# Patient Record
Sex: MALE | Race: WHITE | Employment: FULL TIME | URBAN - NONMETROPOLITAN AREA
[De-identification: names, ages, dates, MRNs, and addresses within clinical notes are randomized per-mention and may not be internally consistent; named-entity substitution may affect disease eponyms.]

---

## 2017-05-31 DIAGNOSIS — I10 ESSENTIAL HYPERTENSION: ICD-10-CM

## 2017-05-31 DIAGNOSIS — E03.2 HYPOTHYROIDISM DUE TO MEDICATION: ICD-10-CM

## 2017-05-31 NOTE — LETTER
[facility]  [address] [facility] [address]  Phone: [phone]        June 1, 2017      Brandt EMERSON Wiley                                                                                                                   [address] [facility] [address]            Dear Mr. Wiley,    We are concerned about your health care.  We recently provided you with a medication refill.  Many medications require routine follow-up with your Doctor.      At this time we ask that: You schedule an appointment for your annual physical.    Your prescription: Has been refilled for 1 month so you may have time for the above noted follow-up.      Thank you,      Sebastien Faria MD/ ss

## 2017-06-01 RX ORDER — ATENOLOL 25 MG/1
25 TABLET ORAL DAILY
Qty: 30 TABLET | Refills: 0 | Status: SHIPPED | OUTPATIENT
Start: 2017-06-01 | End: 2017-07-05

## 2017-06-01 RX ORDER — LEVOTHYROXINE SODIUM 50 UG/1
50 TABLET ORAL DAILY
Qty: 30 TABLET | Refills: 0 | Status: SHIPPED | OUTPATIENT
Start: 2017-06-01 | End: 2017-07-05

## 2017-06-01 NOTE — TELEPHONE ENCOUNTER
Atenolol 25 mg      Last Written Prescription Date: 6/27/16  Last Fill Quantity: 90, # refills: 3  Last Office Visit with [facility], [facility] or [facility] prescribing provider: 6/27/16       Potassium   Date Value Ref Range Status   01/18/2016 4.2 3.4 - 5.3 mmol/L Final     Creatinine   Date Value Ref Range Status   01/18/2016 0.81 0.66 - 1.25 mg/dL Final     BP Readings from Last 3 Encounters:   06/27/16 118/76   02/22/16 146/89   06/01/15 132/86     Levothyroxine 50 mcg     Last Written Prescription Date: 6/27/16  Last Quantity: 90, # refills: 3  Last Office Visit with [facility], [facility] or [facility] prescribing provider: 6/27/16        TSH   Date Value Ref Range Status   01/18/2016 1.84 0.40 - 4.00 mU/L Final

## 2017-06-15 DIAGNOSIS — F20.9 SCHIZOPHRENIA, UNSPECIFIED TYPE (H): Primary | ICD-10-CM

## 2017-06-15 RX ORDER — MULTIVITAMIN WITH IRON
100 TABLET ORAL DAILY
Qty: 90 TABLET | Refills: 3 | Status: SHIPPED | OUTPATIENT
Start: 2017-06-15 | End: 2018-06-19

## 2017-06-15 NOTE — TELEPHONE ENCOUNTER
pyridoxine (VITAMIN B-6) 100 MG tablet      Last Written Prescription Date:    Last Fill Quantity: ,   # refills:   Last Office Visit with [facility], [facility] or [facility] prescribing provider: 6/27/2016  Future Office visit:       Routing refill request to provider for review/approval because:  Medication is reported/historical

## 2017-07-05 DIAGNOSIS — E03.2 HYPOTHYROIDISM DUE TO MEDICATION: ICD-10-CM

## 2017-07-05 DIAGNOSIS — I10 ESSENTIAL HYPERTENSION: ICD-10-CM

## 2017-07-05 RX ORDER — ATENOLOL 25 MG/1
TABLET ORAL
Qty: 30 TABLET | Refills: 0 | Status: SHIPPED | OUTPATIENT
Start: 2017-07-05 | End: 2017-07-26

## 2017-07-05 RX ORDER — LEVOTHYROXINE SODIUM 50 UG/1
TABLET ORAL
Qty: 30 TABLET | Refills: 0 | Status: SHIPPED | OUTPATIENT
Start: 2017-07-05 | End: 2017-07-26

## 2017-07-05 NOTE — TELEPHONE ENCOUNTER
levothyroxine (SYNTHROID/LEVOTHROID) 50 MCG tablet     Last Written Prescription Date: 06/01/2017  Last Quantity: 30 tablet, # refills: 0  Last Office Visit with [facility], [facility] or [facility] prescribing provider: 06/27/2016        TSH   Date Value Ref Range Status   01/18/2016 1.84 0.40 - 4.00 mU/L Final     atenolol (TENORMIN) 25 MG tablet      Last Written Prescription Date: 06/01/2017  Last Fill Quantity: 30 tablet, # refills: 0    Last Office Visit with [facility], [facility] or [facility] prescribing provider:  06/27/2016   Future Office Visit:        BP Readings from Last 3 Encounters:   06/27/16 118/76   02/22/16 146/89   06/01/15 132/86

## 2017-07-26 DIAGNOSIS — E03.2 HYPOTHYROIDISM DUE TO MEDICATION: ICD-10-CM

## 2017-07-26 DIAGNOSIS — I10 ESSENTIAL HYPERTENSION: ICD-10-CM

## 2017-07-26 RX ORDER — ATENOLOL 25 MG/1
TABLET ORAL
Qty: 30 TABLET | Refills: 0 | Status: SHIPPED | OUTPATIENT
Start: 2017-07-26 | End: 2018-02-08

## 2017-07-26 RX ORDER — LEVOTHYROXINE SODIUM 50 UG/1
TABLET ORAL
Qty: 30 TABLET | Refills: 0 | Status: SHIPPED | OUTPATIENT
Start: 2017-07-26 | End: 2017-08-23

## 2017-07-26 NOTE — LETTER
[facility]  [address] [facility] [address]  Phone: [phone]        July 27, 2017      Brandt Wiley                                                                                                                   [address] [facility] [address]            Dear Mr. Wiley,    We are concerned about your health care.  We recently provided you with a medication refill.  Many medications require routine follow-up with your Doctor.      At this time we ask that: You schedule an appointment for your annual physical.    Your prescription: Has been refilled for 1 month so you may have time for the above noted follow-up.      Thank you,      Sebastien Faria MD/ ss

## 2017-07-26 NOTE — TELEPHONE ENCOUNTER
Last seen 6/27/16.  Medication is being filled for 1 time refill only due to:  Patient needs to be seen because it has been more than one year since last visit.   TSH   Date Value Ref Range Status   01/18/2016 1.84 0.40 - 4.00 mU/L Final   ]    Left message for patient to return a call to the clinic RN.  Due for provider alonzo Morgan RN

## 2017-07-26 NOTE — TELEPHONE ENCOUNTER
atenolol (TENORMIN) 25 MG tablet      Last Written Prescription Date: 7/5/17  Last Fill Quantity: 30, # refills: 0  Last Office Visit with [facility], [facility] or  [facility] prescribing provider: 6/27/17       Potassium   Date Value Ref Range Status   01/18/2016 4.2 3.4 - 5.3 mmol/L Final     Creatinine   Date Value Ref Range Status   01/18/2016 0.81 0.66 - 1.25 mg/dL Final     BP Readings from Last 3 Encounters:   06/27/16 118/76   02/22/16 146/89   06/01/15 132/86         levothyroxine (SYNTHROID/LEVOTHROID) 50 MCG tablet     Last Written Prescription Date: 7/5/17  Last Quantity: 30, # refills: 0  Last Office Visit with [facility], [facility], [facility] or [facility] prescribing provider: 6/27/17        TSH   Date Value Ref Range Status   01/18/2016 1.84 0.40 - 4.00 mU/L Final

## 2017-08-23 ENCOUNTER — OFFICE VISIT (OUTPATIENT)
Dept: FAMILY MEDICINE | Facility: CLINIC | Age: 37
End: 2017-08-23
Payer: MEDICAID

## 2017-08-23 VITALS
HEART RATE: 72 BPM | HEIGHT: 70 IN | WEIGHT: 280 LBS | RESPIRATION RATE: 16 BRPM | SYSTOLIC BLOOD PRESSURE: 120 MMHG | DIASTOLIC BLOOD PRESSURE: 78 MMHG | BODY MASS INDEX: 40.09 KG/M2 | TEMPERATURE: 98 F

## 2017-08-23 DIAGNOSIS — Z79.899 LITHIUM USE: ICD-10-CM

## 2017-08-23 DIAGNOSIS — E03.2 HYPOTHYROIDISM DUE TO MEDICATION: ICD-10-CM

## 2017-08-23 DIAGNOSIS — R73.09 ELEVATED GLUCOSE: ICD-10-CM

## 2017-08-23 DIAGNOSIS — I10 ESSENTIAL HYPERTENSION: ICD-10-CM

## 2017-08-23 DIAGNOSIS — E66.01 MORBID OBESITY DUE TO EXCESS CALORIES (H): ICD-10-CM

## 2017-08-23 DIAGNOSIS — F20.9 SCHIZOPHRENIA, UNSPECIFIED TYPE (H): Primary | ICD-10-CM

## 2017-08-23 DIAGNOSIS — E78.5 HYPERLIPIDEMIA LDL GOAL <160: ICD-10-CM

## 2017-08-23 DIAGNOSIS — F79 MENTAL RETARDATION: ICD-10-CM

## 2017-08-23 LAB
ERYTHROCYTE [DISTWIDTH] IN BLOOD BY AUTOMATED COUNT: 13.8 % (ref 10–15)
HBA1C MFR BLD: 4.7 % (ref 4.3–6)
HCT VFR BLD AUTO: 38.9 % (ref 40–53)
HGB BLD-MCNC: 12.5 G/DL (ref 13.3–17.7)
MCH RBC QN AUTO: 29.4 PG (ref 26.5–33)
MCHC RBC AUTO-ENTMCNC: 32.1 G/DL (ref 31.5–36.5)
MCV RBC AUTO: 92 FL (ref 78–100)
PLATELET # BLD AUTO: 198 10E9/L (ref 150–450)
RBC # BLD AUTO: 4.25 10E12/L (ref 4.4–5.9)
WBC # BLD AUTO: 6.3 10E9/L (ref 4–11)

## 2017-08-23 PROCEDURE — 84443 ASSAY THYROID STIM HORMONE: CPT | Performed by: FAMILY MEDICINE

## 2017-08-23 PROCEDURE — 83036 HEMOGLOBIN GLYCOSYLATED A1C: CPT | Performed by: FAMILY MEDICINE

## 2017-08-23 PROCEDURE — 85027 COMPLETE CBC AUTOMATED: CPT | Performed by: FAMILY MEDICINE

## 2017-08-23 PROCEDURE — 36415 COLL VENOUS BLD VENIPUNCTURE: CPT | Performed by: FAMILY MEDICINE

## 2017-08-23 PROCEDURE — 80053 COMPREHEN METABOLIC PANEL: CPT | Performed by: FAMILY MEDICINE

## 2017-08-23 PROCEDURE — 99214 OFFICE O/P EST MOD 30 MIN: CPT | Performed by: FAMILY MEDICINE

## 2017-08-23 PROCEDURE — 80061 LIPID PANEL: CPT | Performed by: FAMILY MEDICINE

## 2017-08-23 RX ORDER — LEVOTHYROXINE SODIUM 50 UG/1
TABLET ORAL
Qty: 90 TABLET | Refills: 3 | Status: SHIPPED | OUTPATIENT
Start: 2017-08-23 | End: 2018-07-16

## 2017-08-23 RX ORDER — ATENOLOL 25 MG/1
25 TABLET ORAL DAILY
Qty: 90 TABLET | Refills: 3 | Status: SHIPPED | OUTPATIENT
Start: 2017-08-23 | End: 2018-07-16

## 2017-08-23 NOTE — LETTER
September 5, 2017      Brandt Wiley  [address] [facility] [address]        Dear ,    We are writing to inform you of your test results.    labs are stable.  No diabetes seen.     Resulted Orders   Comprehensive metabolic panel (BMP + Alb, Alk Phos, ALT, AST, Total. Bili, TP)   Result Value Ref Range    Sodium 136 133 - 144 mmol/L    Potassium 4.1 3.4 - 5.3 mmol/L    Chloride 105 94 - 109 mmol/L    Carbon Dioxide 26 20 - 32 mmol/L    Anion Gap 5 3 - 14 mmol/L    Glucose 89 70 - 99 mg/dL    Urea Nitrogen 11 7 - 30 mg/dL    Creatinine 0.79 0.66 - 1.25 mg/dL    GFR Estimate >90 >60 mL/min/1.7m2      Comment:      Non  GFR Calc    GFR Estimate If Black >90 >60 mL/min/1.7m2      Comment:       GFR Calc    Calcium 9.1 8.5 - 10.1 mg/dL    Bilirubin Total 0.3 0.2 - 1.3 mg/dL    Albumin 4.2 3.4 - 5.0 g/dL    Protein Total 7.5 6.8 - 8.8 g/dL    Alkaline Phosphatase 58 40 - 150 U/L    ALT 26 0 - 70 U/L    AST 19 0 - 45 U/L   CBC with platelets   Result Value Ref Range    WBC 6.3 4.0 - 11.0 10e9/L    RBC Count 4.25 (L) 4.4 - 5.9 10e12/L    Hemoglobin 12.5 (L) 13.3 - 17.7 g/dL    Hematocrit 38.9 (L) 40.0 - 53.0 %    MCV 92 78 - 100 fl    MCH 29.4 26.5 - 33.0 pg    MCHC 32.1 31.5 - 36.5 g/dL    RDW 13.8 10.0 - 15.0 %    Platelet Count 198 150 - 450 10e9/L   Hemoglobin A1c   Result Value Ref Range    Hemoglobin A1C 4.7 4.3 - 6.0 %   TSH with free T4 reflex   Result Value Ref Range    TSH 1.39 0.40 - 4.00 mU/L   Lipid panel reflex to direct LDL   Result Value Ref Range    Cholesterol 198 <200 mg/dL    Triglycerides 227 (H) <150 mg/dL      Comment:      Borderline high:  150-199 mg/dl  High:             200-499 mg/dl  Very high:       >499 mg/dl      HDL Cholesterol 44 >39 mg/dL    LDL Cholesterol Calculated 109 (H) <100 mg/dL      Comment:      Above desirable:  100-129 mg/dl  Borderline High:  130-159 mg/dL  High:             160-189 mg/dL  Very high:       >189 mg/dl       Non HDL Cholesterol 154 (H) <130 mg/dL      Comment:      Above Desirable:  130-159 mg/dl  Borderline high:  160-189 mg/dl  High:             190-219 mg/dl  Very high:       >219 mg/dl         If you have any questions or concerns, please call the clinic at the number listed above.       Sincerely,        Sebastien Faria MD

## 2017-08-23 NOTE — PROGRESS NOTES
"  SUBJECTIVE:   Brandt Wiley is a 37 year old male who presents to clinic today for the following health issues:       Group Home PE      Description (location/character/radiation): PE, Forms, standing orders, labs - concerned about diabetes    Intensity:  mild    Accompanying signs and symptoms: none    History (similar episodes/previous evaluation): None    Precipitating or alleviating factors: None    Therapies tried and outcome: None         S: Brandt Wiley is a 37 year old male with     Schizophrenia; currently does not have mental health provider.  Needs one.  Several psych meds we are temporarily giving him    Htn; needs fills on atenolol. Controlled  Mental retardation: group home.  Schizophrenia related as well.   Morbid obesity: ongoing.  Risk for DM2  Lithium use: recheck labs  Hypothyroid: recheck labs      Problem list, med list, additional histories reviewed and updated, as indicated.      No cp or sob    O:/78  Pulse 72  Temp 98  F (36.7  C) (Tympanic)  Resp 16  Ht 5' 10\" (1.778 m)  Wt 280 lb (127 kg)  BMI 40.18 kg/m2  GEN: Alert and oriented, in no acute distress  CV: RRR, 2/6 systolic murmur  RESP: lungs clear bilaterally, good effort  EXT: no edema or lesions noted in lower extremities    A: Schizophrenia; currently does not have mental health provider.  Needs one.  Htn; needs fills on atenolol. Controlled  Mental retardation: group home.  Schizophrenia related as well.   Morbid obesity: ongoing.  Risk for DM2  Lithium use: recheck labs  Hypothyroid: recheck labs    P: labs.  Fills.  May need to give psych meds for a few months    Weight management plan: diet/exericse         "

## 2017-08-23 NOTE — NURSING NOTE
"Chief Complaint   Patient presents with     Physical-Other     group home PE       Initial /78  Pulse 72  Temp 98  F (36.7  C) (Tympanic)  Resp 16  Ht 5' 10\" (1.778 m)  Wt 280 lb (127 kg)  BMI 40.18 kg/m2 Estimated body mass index is 40.18 kg/(m^2) as calculated from the following:    Height as of this encounter: 5' 10\" (1.778 m).    Weight as of this encounter: 280 lb (127 kg).  Medication Reconciliation: complete    Health Maintenance that is potentially due pending provider review:  NONE    n/a    Is there anyone who you would like to be able to receive your results? No  If yes have patient fill out CASIMIRO    "

## 2017-08-23 NOTE — MR AVS SNAPSHOT
After Visit Summary   8/23/2017    Brandt Wiley    MRN: [id number]           Patient Information     Date Of Birth          1980        Visit Information        Provider Department      8/23/2017 1:40 PM Sebastien Faria MD [facility]        Today's Diagnoses     Schizophrenia, unspecified type (H)    -  1    Essential hypertension        Hypothyroidism due to medication        Mental retardation        Hyperlipidemia LDL goal <160        Morbid obesity due to excess calories (H)        Elevated glucose        Lithium use           Follow-ups after your visit        Additional Services     CARE COORDINATION REFERRAL       Services are provided by a Care Coordinator for people with complex needs such as: medical, social, or financial troubles.  The Care Coordinator works with the patient and their Primary Care Provider to determine health goals, obtain resources, achieve outcomes, and develop care plans that help coordinate the patient's care.     Reason for Referral: Mental Status/Behavioral Changes    Provide additional details for Care Coordination to best meet the patient's current needs: needs to find mental health provider, maybe care coordination can help?      Clinical Staff have discussed the Care Coordination Referral with the patient and/or caregiver: yes                  Who to contact     If you have questions or need follow up information about today's clinic visit or your schedule please contact [facility] directly at [phone].  Normal or non-critical lab and imaging results will be communicated to you by MyChart, letter or phone within 4 business days after the clinic has received the results. If you do not hear from us within 7 days, please contact the clinic through MyChart or phone. If you have a critical or abnormal lab result, we will notify you by phone as soon as possible.  Submit refill requests through [employer] or call your pharmacy and  "they will forward the refill request to us. Please allow 3 business days for your refill to be completed.          Additional Information About Your Visit        [employer] Information     [employer] lets you send messages to your doctor, view your test results, renew your prescriptions, schedule appointments and more. To sign up, go to [email or web address] . Click on \"Log in\" on the left side of the screen, which will take you to the Welcome page. Then click on \"Sign up Now\" on the right side of the page.     You will be asked to enter the access code listed below, as well as some personal information. Please follow the directions to create your username and password.     Your access code is: [email or web address]  Expires: 2017  3:45 PM     Your access code will  in 90 days. If you need help or a new code, please call your [facility] clinic or [phone].        Care EveryWhere ID     This is your [facility] EveryWhere ID. This could be used by other organizations to access your [facility] medical records  [id number]        Your Vitals Were     Pulse Temperature Respirations Height BMI (Body Mass Index)       72 98  F (36.7  C) (Tympanic) 16 5' 10\" (1.778 m) 40.18 kg/m2        Blood Pressure from Last 3 Encounters:   17 120/78   16 118/76   16 146/89    Weight from Last 3 Encounters:   17 280 lb (127 kg)   16 277 lb (125.6 kg)   16 270 lb (122.5 kg)              We Performed the Following     CARE COORDINATION REFERRAL     CBC with platelets     Comprehensive metabolic panel (BMP + Alb, Alk Phos, ALT, AST, Total. Bili, TP)     Hemoglobin A1c     Lipid panel reflex to direct LDL     TSH with free T4 reflex          Today's Medication Changes          These changes are accurate as of: 17  3:45 PM.  If you have any questions, ask your nurse or doctor.               These medicines have changed or have updated prescriptions.        Dose/Directions    * atenolol 25 MG tablet "   Commonly known as:  TENORMIN   This may have changed:  Another medication with the same name was added. Make sure you understand how and when to take each.   Used for:  Essential hypertension   Changed by:  Sebastien Faria MD        Take 1 tablet (25 mg) by mouth daily DUE FOR APPOINTMENT June 2017. NO FURTHER REFILLS   Quantity:  30 tablet   Refills:  0       * atenolol 25 MG tablet   Commonly known as:  TENORMIN   This may have changed:  You were already taking a medication with the same name, and this prescription was added. Make sure you understand how and when to take each.   Used for:  Essential hypertension   Changed by:  Sebastien Faria MD        Dose:  25 mg   Take 1 tablet (25 mg) by mouth daily   Quantity:  90 tablet   Refills:  3       levothyroxine 50 MCG tablet   Commonly known as:  SYNTHROID/LEVOTHROID   This may have changed:  See the new instructions.   Used for:  Hypothyroidism due to medication   Changed by:  Sebastien Faria MD        Take 1 tablet (50 mcg) by mouth daily   Quantity:  90 tablet   Refills:  3       * Notice:  This list has 2 medication(s) that are the same as other medications prescribed for you. Read the directions carefully, and ask your doctor or other care provider to review them with you.         Where to get your medicines      These medications were sent to [facility] [address] [address]    Hours:  test rx sent successfully  2/8/05  kr Phone:  [phone]     atenolol 25 MG tablet    levothyroxine 50 MCG tablet                Primary Care Provider Office Phone # Fax #    Sebastien Faria [phone] [address] [place] [address]        Equal Access to Services     [facility] AH: Hadii andres sneed hadfabby Soanai, waaxda luqadaha, qaybta kaalshin blancas, tomasa mahajan. So [facility] [phone].    ATENCIÓN: Si brett marquez  nj disposición servicios gratuitos de asistencia lingüística. Allyson sterling [phone].    We comply with applicable federal civil rights laws and [place] laws. We do not discriminate on the basis of race, color, national origin, age, disability sex, sexual orientation or gender identity.            Thank you!     Thank you for choosing [facility]  for your care. Our goal is always to provide you with excellent care. Hearing back from our patients is one way we can continue to improve our services. Please take a few minutes to complete the written survey that you may receive in the mail after your visit with us. Thank you!             Your Updated Medication List - Protect others around you: Learn how to safely use, store and throw away your medicines at www.disposemymeds.org.          This list is accurate as of: 8/23/17  3:45 PM.  Always use your most recent med list.                   Brand Name Dispense Instructions for use Diagnosis    * atenolol 25 MG tablet    TENORMIN    30 tablet    Take 1 tablet (25 mg) by mouth daily DUE FOR APPOINTMENT June 2017. NO FURTHER REFILLS    Essential hypertension       * atenolol 25 MG tablet    TENORMIN    90 tablet    Take 1 tablet (25 mg) by mouth daily    Essential hypertension       citalopram 40 MG tablet    celeXA    30 tablet    Take 1 tablet (40 mg) by mouth daily    Schizophrenia, unspecified type (H), Mental retardation       clonazePAM 1 MG tablet    klonoPIN     0.5mg twice daily, 1mg prn for agitation        divalproex 250 MG 24 hr tablet    DEPAKOTE ER    180 tablet    Take 6 tablets (1,500 mg) by mouth daily    Schizophrenia, unspecified type (H), Mental retardation       haloperidol 10 MG tablet    HALDOL     10mg at 8am, 10mg at noon, 15mg at 6pk00ox at 8am, 5mg at noon, 15mg at 8pm        levothyroxine 50 MCG tablet    SYNTHROID/LEVOTHROID    90 tablet    Take 1 tablet (50 mcg) by mouth daily    Hypothyroidism due to medication       lithium  300 MG capsule     120 capsule    Take 2 capsules (600 mg) by mouth 2 times daily (with meals)    Schizophrenia, unspecified type (H), Mental retardation       NEW MED      X-vitate 40%, apply thinly, severe dry feet        pyridoxine 100 MG tablet    VITAMIN B-6    90 tablet    Take 1 tablet (100 mg) by mouth daily    Schizophrenia, unspecified type (H)       * QUEtiapine 300 MG tablet    SEROquel     Take 300 mg by mouth daily.        * QUEtiapine 400 MG tablet    SEROquel    60 tablet    Take 1 tablet (400 mg) by mouth 2 times daily    Schizophrenia, unspecified type (H), Mental retardation       trihexyphenidyl 5 MG tablet    ARTANE    60 tablet    Take 1 tablet (5 mg) by mouth 2 times daily    Schizophrenia, unspecified type (H), Mental retardation       * Notice:  This list has 4 medication(s) that are the same as other medications prescribed for you. Read the directions carefully, and ask your doctor or other care provider to review them with you.

## 2017-08-24 ENCOUNTER — CARE COORDINATION (OUTPATIENT)
Dept: CARE COORDINATION | Facility: CLINIC | Age: 37
End: 2017-08-24

## 2017-08-24 LAB
ALBUMIN SERPL-MCNC: 4.2 G/DL (ref 3.4–5)
ALP SERPL-CCNC: 58 U/L (ref 40–150)
ALT SERPL W P-5'-P-CCNC: 26 U/L (ref 0–70)
ANION GAP SERPL CALCULATED.3IONS-SCNC: 5 MMOL/L (ref 3–14)
AST SERPL W P-5'-P-CCNC: 19 U/L (ref 0–45)
BILIRUB SERPL-MCNC: 0.3 MG/DL (ref 0.2–1.3)
BUN SERPL-MCNC: 11 MG/DL (ref 7–30)
CALCIUM SERPL-MCNC: 9.1 MG/DL (ref 8.5–10.1)
CHLORIDE SERPL-SCNC: 105 MMOL/L (ref 94–109)
CHOLEST SERPL-MCNC: 198 MG/DL
CO2 SERPL-SCNC: 26 MMOL/L (ref 20–32)
CREAT SERPL-MCNC: 0.79 MG/DL (ref 0.66–1.25)
GFR SERPL CREATININE-BSD FRML MDRD: >90 ML/MIN/1.7M2
GLUCOSE SERPL-MCNC: 89 MG/DL (ref 70–99)
HDLC SERPL-MCNC: 44 MG/DL
LDLC SERPL CALC-MCNC: 109 MG/DL
NONHDLC SERPL-MCNC: 154 MG/DL
POTASSIUM SERPL-SCNC: 4.1 MMOL/L (ref 3.4–5.3)
PROT SERPL-MCNC: 7.5 G/DL (ref 6.8–8.8)
SODIUM SERPL-SCNC: 136 MMOL/L (ref 133–144)
TRIGL SERPL-MCNC: 227 MG/DL
TSH SERPL DL<=0.005 MIU/L-ACNC: 1.39 MU/L (ref 0.4–4)

## 2017-08-24 NOTE — LETTER
Health Care Home - Access Care Plan    About Me  Patient Name:  Brandt Wiley    YOB: 1980  Age:                            37 year old   [place] MRN:         [id number] Telephone Information:     Home Phone [phone]   Mobile Not on file.       Address:    [address] [place] [address] Email address:  No e-mail address on record      Emergency Contact(s)  Name Relationship Lgl Grd Work Phone Home Phone Mobile Phone   1. SHUN ARECHIGA* Mother  [phone]    2. DIONI/KAYLA ENG    [phone]              Health Maintenance:      My Access Plan  Medical Emergency 911   Questions or concerns during clinic hours Primary Clinic Line, I will call the clinic directly: Primary Clinic: [facility]- [phone]   24 Hour Appointment Line [phone] or  [phone])  (toll free)   24 Hour Nurse Line [phone] (toll free)   Questions or concerns outside clinic hours 24 Hour Appointment Line, I will call the after-hours on-call line:   [facility] [phone] or [phone] ([phone]) (toll-free)   Preferred Urgent Care     Preferred Hospital     Preferred Pharmacy [place], [place] - [address] [facility]     Behavioral Health Crisis Line The National Suicide Prevention Lifeline at 1-107.141.2400 or 911     My Care Team Members  Patient Care Team       Relationship Specialty Notifications Start End    Sebastien Faria MD PCP - General Family Practice  4/27/11     Phone: [phone] Fax: [phone] [address] [place] [address]        My Medical and Care Information  Problem List   Patient Active Problem List   Diagnosis     Schizophrenia (H)     HTN (hypertension)     Mental retardation     Hypothyroidism     Hyperlipidemia LDL goal <160     Abnormal stress test     Exotropia of right eye     Morbid obesity due to excess calories (H)     Lithium use      Current  Medications and Allergies:  See printed Medication Report

## 2017-08-24 NOTE — PROGRESS NOTES
Clinic Care Coordination Contact  [facility]/Voicemail    Referral Source: PCP  Clinical Data: Care Coordinator Outreach  Outreach attempted x 1.  Left message on voicemail with call back information and requested return call.  Plan: Care Coordinator will mail out care coordination introduction letter with care coordinator contact information and explanation of care coordination services. Care Coordinator will try to reach patient again in 1-2 business days.  LESLY Figueroa, Clinic Care Coordinator 8/24/2017   9:59 AM  [phone]

## 2017-08-24 NOTE — LETTER
[facility]  [address] [place] [address]  [phone]      8/24/2017      Brandt Wiley  [address] [facility] [address]        Dear  Brandt,    I am the Clinic Care Coordinator that works with your primary care provider's clinic. I have been trying to reach you recently to introduce Clinic Care Coordination and to see if there was anything I could assist you with.  Below is a description of what Clinic Care Coordination is and how I can further assist you.     The Clinic Care Coordinator role is a Registered Nurse and/or  who understands the health care system. The goal of Clinic Care Coordination is to help you manage your health and improve access to the [facility] in the most efficient manner.  The Registered Nurse can assist you in meeting your health care goals by providing education, coordinating services, and strengthening the communication among your providers. The  can assist you with financial, behavioral, psychosocial, and chemical dependency and counseling/psychiatric resources.    Please feel free to keep this letter and contact information to contact me at [phone] with any further questions or concerns that may arise. We at [facility] are focused on providing you with the highest-quality healthcare experience possible and that all starts with you.     Sincerely,     Arlen Her, [facility]  Clinic Care Coordination  [phone]      Enclosed: I have enclosed a copy of a 24 Hour Access Plan. This has helpful phone numbers for you to call when needed. Please keep this in an easy to access place to use as needed.

## 2017-08-28 NOTE — PROGRESS NOTES
Clinic Care Coordination Contact  [facility]/Voicemail    Referral Source: PCP  Clinical Data: Care Coordinator Outreach  Outreach attempted x 2.  Left message on voicemail with call back information and requested return call.  Plan: Care Coordinator mailed out care coordination introduction letter on 8-24-17. Care Coordinator will do no further outreaches at this time.  LESLY Figueroa, Clinic Care Coordinator 8/28/2017   1:12 PM  [phone]

## 2017-09-05 ENCOUNTER — TELEPHONE (OUTPATIENT)
Dept: FAMILY MEDICINE | Facility: CLINIC | Age: 37
End: 2017-09-05

## 2017-09-05 DIAGNOSIS — F20.9 SCHIZOPHRENIA (H): Primary | ICD-10-CM

## 2017-09-05 NOTE — TELEPHONE ENCOUNTER
Wrong Referral was placed. A Care Coordinator referral placed. Pt needs a Mental Health Provider at [facility]. Please call with Tele number for the Mental Health Referral. Please Advise.  Sarah [place] Sec

## 2017-09-06 DIAGNOSIS — F20.9 SCHIZOPHRENIA, UNSPECIFIED TYPE (H): ICD-10-CM

## 2017-09-06 DIAGNOSIS — F79 MENTAL RETARDATION: ICD-10-CM

## 2017-09-06 RX ORDER — DIVALPROEX SODIUM 500 MG/1
TABLET, EXTENDED RELEASE ORAL
Qty: 90 TABLET | Refills: 0 | Status: SHIPPED | OUTPATIENT
Start: 2017-09-06 | End: 2017-09-19

## 2017-09-06 RX ORDER — TRIHEXYPHENIDYL HYDROCHLORIDE 5 MG/1
TABLET ORAL
Qty: 60 TABLET | Refills: 0 | Status: SHIPPED | OUTPATIENT
Start: 2017-09-06 | End: 2017-09-19

## 2017-09-06 RX ORDER — LITHIUM CARBONATE 300 MG/1
CAPSULE ORAL
Qty: 120 CAPSULE | Refills: 0 | Status: SHIPPED | OUTPATIENT
Start: 2017-09-06 | End: 2017-09-19

## 2017-09-06 RX ORDER — CITALOPRAM HYDROBROMIDE 40 MG/1
TABLET ORAL
Qty: 30 TABLET | Refills: 1 | Status: SHIPPED | OUTPATIENT
Start: 2017-09-06 | End: 2017-09-19

## 2017-09-06 RX ORDER — QUETIAPINE FUMARATE 400 MG/1
TABLET, FILM COATED ORAL
Qty: 60 TABLET | Refills: 0 | Status: SHIPPED | OUTPATIENT
Start: 2017-09-06 | End: 2017-09-19

## 2017-09-06 NOTE — TELEPHONE ENCOUNTER
citalopram (CELEXA) 40 MG tablet     Last Written Prescription Date: 8/16/17  Last Fill Quantity: 30, # refills: 0  Last Office Visit with [facility] primary care provider:  8/23/17        Last PHQ-9 score on record= No flowsheet data found.

## 2017-09-06 NOTE — TELEPHONE ENCOUNTER
divalproex (DEPAKOTE ER) 250 MG 24 hr tablet     Last Written Prescription Date: 8/13/17  Last Fill Quantity: 180, # refills: 0  Last Office Visit with [facility] or [facility] prescribing provider: 8/23/17       Lab Results   Component Value Date    ALT 26 08/23/2017     Lab Results   Component Value Date    WBC 6.3 08/23/2017     Lab Results   Component Value Date    RBC 4.25 08/23/2017     Lab Results   Component Value Date    HGB 12.5 08/23/2017     Lab Results   Component Value Date    HCT 38.9 08/23/2017     No components found for: MCT  Lab Results   Component Value Date    MCV 92 08/23/2017     Lab Results   Component Value Date    MCH 29.4 08/23/2017     Lab Results   Component Value Date    MCHC 32.1 08/23/2017     Lab Results   Component Value Date    RDW 13.8 08/23/2017     Lab Results   Component Value Date     08/23/2017     TSH   Date Value Ref Range Status   08/23/2017 1.39 0.40 - 4.00 mU/L Final     Creatinine   Date Value Ref Range Status   08/23/2017 0.79 0.66 - 1.25 mg/dL Final       Drug Levels  Depakote:   Results for orders placed or performed in visit on 01/18/16   Valproic acid   Result Value Ref Range    Valproic Acid Level 96 50 - 100 mg/L     Dilantin: No results found for this or any previous visit.  Gabitril: No results found for this or any previous visit.  Tegretol: No results found for this or any previous visit.  Zonegran: No results found for this or any previous visit.         lithium 300 MG capsule      Last Written Prescription Date:  8/16/17  Last Fill Quantity: 120,   # refills: 0  Last Office Visit with [facility] or [facility] prescribing provider: 8/23/17  Future Office visit:       Routing refill request to provider for review/approval because:  Drug not on the [facility] or [facility] refill protocol or controlled substance      QUEtiapine (SEROQUEL) 400 MG tablet     Last Written Prescription Date: 8/16/17  Last Fill Quantity: 60, # refills: 0  Last Office Visit with [facility]  primary care provider:  8/23/17        Last PHQ-9 score on record= No flowsheet data found.            trihexyphenidyl (ARTANE) 5 MG tablet      Last Written Prescription Date:  8/16/17  Last Fill Quantity: 60,   # refills: 0  [place] [facility] prescribing provider: 8/23/17  Future Office visit:       Routing refill request to provider for review/approval because:  Drug not on the [facility] or [facility] refill protocol or controlled substance

## 2017-09-19 ENCOUNTER — OFFICE VISIT (OUTPATIENT)
Dept: PSYCHIATRY | Facility: CLINIC | Age: 37
End: 2017-09-19
Attending: FAMILY MEDICINE
Payer: MEDICAID

## 2017-09-19 VITALS
DIASTOLIC BLOOD PRESSURE: 71 MMHG | TEMPERATURE: 96.9 F | HEART RATE: 66 BPM | SYSTOLIC BLOOD PRESSURE: 113 MMHG | WEIGHT: 278 LBS | BODY MASS INDEX: 39.89 KG/M2

## 2017-09-19 DIAGNOSIS — Z51.81 ENCOUNTER FOR THERAPEUTIC DRUG MONITORING: ICD-10-CM

## 2017-09-19 DIAGNOSIS — F20.9 SCHIZOPHRENIA, UNSPECIFIED TYPE (H): ICD-10-CM

## 2017-09-19 DIAGNOSIS — F79 MENTAL RETARDATION: ICD-10-CM

## 2017-09-19 DIAGNOSIS — F63.81 INTERMITTENT EXPLOSIVE DISORDER: ICD-10-CM

## 2017-09-19 DIAGNOSIS — F25.0 SCHIZOAFFECTIVE DISORDER, BIPOLAR TYPE (H): Primary | ICD-10-CM

## 2017-09-19 PROCEDURE — 90792 PSYCH DIAG EVAL W/MED SRVCS: CPT | Performed by: CLINICAL NURSE SPECIALIST

## 2017-09-19 RX ORDER — CITALOPRAM HYDROBROMIDE 40 MG/1
TABLET ORAL
Qty: 30 TABLET | Refills: 3 | Status: SHIPPED | OUTPATIENT
Start: 2017-09-19 | End: 2017-12-27

## 2017-09-19 RX ORDER — QUETIAPINE FUMARATE 400 MG/1
TABLET, FILM COATED ORAL
Qty: 60 TABLET | Refills: 3 | Status: SHIPPED | OUTPATIENT
Start: 2017-09-19 | End: 2017-12-27

## 2017-09-19 RX ORDER — QUETIAPINE FUMARATE 300 MG/1
TABLET, FILM COATED ORAL
Qty: 30 TABLET | Refills: 3 | Status: SHIPPED | OUTPATIENT
Start: 2017-09-19 | End: 2017-12-27

## 2017-09-19 RX ORDER — LITHIUM CARBONATE 300 MG/1
CAPSULE ORAL
Qty: 120 CAPSULE | Refills: 3 | Status: SHIPPED | OUTPATIENT
Start: 2017-09-19 | End: 2018-01-12

## 2017-09-19 RX ORDER — DIVALPROEX SODIUM 500 MG/1
TABLET, EXTENDED RELEASE ORAL
Qty: 90 TABLET | Refills: 3 | Status: SHIPPED | OUTPATIENT
Start: 2017-09-19 | End: 2017-12-27

## 2017-09-19 RX ORDER — HALOPERIDOL 10 MG/1
TABLET ORAL
Qty: 105 TABLET | Refills: 0 | Status: SHIPPED | OUTPATIENT
Start: 2017-09-19 | End: 2017-10-31

## 2017-09-19 RX ORDER — TRIHEXYPHENIDYL HYDROCHLORIDE 5 MG/1
TABLET ORAL
Qty: 60 TABLET | Refills: 3 | Status: SHIPPED | OUTPATIENT
Start: 2017-09-19 | End: 2018-01-24

## 2017-09-19 ASSESSMENT — ANXIETY QUESTIONNAIRES
6. BECOMING EASILY ANNOYED OR IRRITABLE: MORE THAN HALF THE DAYS
IF YOU CHECKED OFF ANY PROBLEMS ON THIS QUESTIONNAIRE, HOW DIFFICULT HAVE THESE PROBLEMS MADE IT FOR YOU TO DO YOUR WORK, TAKE CARE OF THINGS AT HOME, OR GET ALONG WITH OTHER PEOPLE: SOMEWHAT DIFFICULT
3. WORRYING TOO MUCH ABOUT DIFFERENT THINGS: MORE THAN HALF THE DAYS
GAD7 TOTAL SCORE: 11
1. FEELING NERVOUS, ANXIOUS, OR ON EDGE: NOT AT ALL
5. BEING SO RESTLESS THAT IT IS HARD TO SIT STILL: MORE THAN HALF THE DAYS
7. FEELING AFRAID AS IF SOMETHING AWFUL MIGHT HAPPEN: MORE THAN HALF THE DAYS
2. NOT BEING ABLE TO STOP OR CONTROL WORRYING: NEARLY EVERY DAY

## 2017-09-19 ASSESSMENT — PATIENT HEALTH QUESTIONNAIRE - PHQ9
SUM OF ALL RESPONSES TO PHQ QUESTIONS 1-9: 4
5. POOR APPETITE OR OVEREATING: NOT AT ALL

## 2017-09-19 NOTE — PATIENT INSTRUCTIONS
Treatment Plan:  Continue current psychotropic medications.     Complete labs - lithium level, valproic acid level, hepatic panel, lipid profile, and EKG.    Request past psychiatric records.     Follow up in 6 months or sooner if needed.     - Recommend patient discuss medications with their pharmacist. Risks and benefits of medications discussed, including side effect profile.   - Safety plan was reviewed; to the ER as needed or call after hours crisis line; [phone]  - Education and counseling was done regarding use of medications, psychotherapy options  - Call [phone] for appointment or to speak to a nurse.   -Office hours: Monday through Thursday 8:00 am to 4:30 pm; Friday 8:00 am to Noon.   - Patient was given a copy of this Treatment Plan today.

## 2017-09-19 NOTE — MR AVS SNAPSHOT
After Visit Summary   9/19/2017    Brandt Wiley    MRN: [id number]           Patient Information     Date Of Birth          1980        Visit Information        Provider Department      9/19/2017 1:15 PM Nirali Worthington APRN [facility]        Today's Diagnoses     Schizophrenia, unspecified type (H)        Mental retardation          Care Instructions    Treatment Plan:  Continue current psychotropic medications.     Complete labs - lithium level, valproic acid level, hepatic panel, lipid profile, and EKG.    Request past psychiatric records.     Follow up in 6 months or sooner if needed.     - Recommend patient discuss medications with their pharmacist. Risks and benefits of medications discussed, including side effect profile.   - Safety plan was reviewed; to the ER as needed or call after hours crisis line; [phone]  - Education and counseling was done regarding use of medications, psychotherapy options  - Call [phone] for appointment or to speak to a nurse.   -Office hours: Monday through Thursday 8:00 am to 4:30 pm; Friday 8:00 am to Noon.   - Patient was given a copy of this Treatment Plan today.           Follow-ups after your visit        Who to contact     If you have questions or need follow up information about today's clinic visit or your schedule please contact [facility] directly at [phone].  Normal or non-critical lab and imaging results will be communicated to you by MyChart, letter or phone within 4 business days after the clinic has received the results. If you do not hear from us within 7 days, please contact the clinic through MyChart or phone. If you have a critical or abnormal lab result, we will notify you by phone as soon as possible.  Submit refill requests through [employer] or call your pharmacy and they will forward the refill request to us. Please allow 3 business days for your refill to be completed.           "Additional Information About Your Visit        MyChart Information     [employer] lets you send messages to your doctor, view your test results, renew your prescriptions, schedule appointments and more. To sign up, go to [email or web address] . Click on \"Log in\" on the left side of the screen, which will take you to the Welcome page. Then click on \"Sign up Now\" on the right side of the page.     You will be asked to enter the access code listed below, as well as some personal information. Please follow the directions to create your username and password.     Your access code is: [email or web address]  Expires: 2017  3:45 PM     Your access code will  in 90 days. If you need help or a new code, please call your [place] clinic or [phone].        Care EveryWhere ID     This is your Care EveryWhere ID. This could be used by other organizations to access your [place] medical records  [id number]        Your Vitals Were     Pulse Temperature BMI (Body Mass Index)             66 96.9  F (36.1  C) (Tympanic) 39.89 kg/m2          Blood Pressure from Last 3 Encounters:   17 113/71   17 120/78   16 118/76    Weight from Last 3 Encounters:   17 278 lb (126.1 kg)   17 280 lb (127 kg)   16 277 lb (125.6 kg)              Today, you had the following     No orders found for display         Today's Medication Changes          These changes are accurate as of: 17  1:46 PM.  If you have any questions, ask your nurse or doctor.               These medicines have changed or have updated prescriptions.        Dose/Directions    citalopram 40 MG tablet   Commonly known as:  celeXA   This may have changed:  See the new instructions.   Used for:  Schizophrenia, unspecified type (H), Mental retardation   Changed by:  Nirali Worthington APRN CNS        Take 1 tablet (40 mg) by mouth daily   Quantity:  30 tablet   Refills:  3       divalproex 500 MG 24 hr tablet   Commonly known as:  " DEPAKOTE ER   This may have changed:  See the new instructions.   Used for:  Schizophrenia, unspecified type (H), Mental retardation   Changed by:  Nirali Worthington APRN CNS        Take 3 tablets (1,500 mg) by mouth daily   Quantity:  90 tablet   Refills:  3       haloperidol 10 MG tablet   Commonly known as:  HALDOL   This may have changed:  See the new instructions.   Used for:  Schizophrenia, unspecified type (H)   Changed by:  Nirali Worthington APRN CNS        TAKE 1 tablet Twice a day at 8am and 12 noon and 1 1/2 tablets at bedtime Orally 30 days   Quantity:  105 tablet   Refills:  0       lithium 300 MG capsule   This may have changed:  See the new instructions.   Used for:  Schizophrenia, unspecified type (H), Mental retardation   Changed by:  Nirali Worthington APRN CNS        Take 2 capsules (600 mg) by mouth 2 times daily (with meals)   Quantity:  120 capsule   Refills:  3       * QUEtiapine 400 MG tablet   Commonly known as:  SEROquel   This may have changed:  See the new instructions.   Used for:  Schizophrenia, unspecified type (H), Mental retardation   Changed by:  Nirali Worthington APRN CNS        Take 1 tablet (400 mg) by mouth 2 times daily   Quantity:  60 tablet   Refills:  3       * QUEtiapine 300 MG tablet   Commonly known as:  SEROquel   This may have changed:  See the new instructions.   Used for:  Schizophrenia, unspecified type (H)   Changed by:  Nirali Worthington APRN CNS        1 tablet Once a day at noon Orally 30 days   Quantity:  30 tablet   Refills:  3       trihexyphenidyl 5 MG tablet   Commonly known as:  ARTANE   This may have changed:  See the new instructions.   Used for:  Schizophrenia, unspecified type (H), Mental retardation   Changed by:  Nirali Worthington APRN CNS        Take 1 tablet (5 mg) by mouth 2 times daily   Quantity:  60 tablet   Refills:  3       * Notice:  This list has 2 medication(s) that are the same as other  medications prescribed for you. Read the directions carefully, and ask your doctor or other care provider to review them with you.         Where to get your medicines      These medications were sent to [facility] [place],  - [place], [place] - [address]  [address] [address]    Hours:  test rx sent successfully  2/8/05  kr Phone:  [phone]     citalopram 40 MG tablet    divalproex 500 MG 24 hr tablet    haloperidol 10 MG tablet    lithium 300 MG capsule    QUEtiapine 300 MG tablet    QUEtiapine 400 MG tablet    trihexyphenidyl 5 MG tablet                Primary Care Provider Office Phone # Fax #    Sebastien Faria [phone] [address] [place] [phone]        Equal Access to Services     CARLOS TORRES : Ian sneed hadasho Soomaali, waaxda luqadaha, qaybta kaalmada adeegyada, tomasa urbano . So [facility] [phone].    ATENCIÓN: Si habla español, tiene a nj disposición servicios gratuitos de asistencia lingüística. [facility] [phone].    We comply with applicable federal civil rights laws and [place] laws. We do not discriminate on the basis of race, color, national origin, age, disability sex, sexual orientation or gender identity.            Thank you!     Thank you for choosing [facility]  for your care. Our goal is always to provide you with excellent care. Hearing back from our patients is one way we can continue to improve our services. Please take a few minutes to complete the written survey that you may receive in the mail after your visit with us. Thank you!             Your Updated Medication List - Protect others around you: Learn how to safely use, store and throw away your medicines at www.disposemymeds.org.          This list is accurate as of: 9/19/17  1:46 PM.  Always use your most recent med list.                   Brand Name Dispense Instructions for use Diagnosis    *  atenolol 25 MG tablet    TENORMIN    30 tablet    Take 1 tablet (25 mg) by mouth daily DUE FOR APPOINTMENT June 2017. NO FURTHER REFILLS    Essential hypertension       * atenolol 25 MG tablet    TENORMIN    90 tablet    Take 1 tablet (25 mg) by mouth daily    Essential hypertension       citalopram 40 MG tablet    celeXA    30 tablet    Take 1 tablet (40 mg) by mouth daily    Schizophrenia, unspecified type (H), Mental retardation       * clonazePAM 1 MG tablet    klonoPIN     0.5mg twice daily, 1mg prn for agitation        * clonazePAM 0.5 MG tablet    klonoPIN    60 tablet    TAKE ONE TABLET BY MOUTH TWICE DAILY (AT 8 AM AND 8 PM)    Schizophrenia, unspecified type (H)       divalproex 500 MG 24 hr tablet    DEPAKOTE ER    90 tablet    Take 3 tablets (1,500 mg) by mouth daily    Schizophrenia, unspecified type (H), Mental retardation       haloperidol 10 MG tablet    HALDOL    105 tablet    TAKE 1 tablet Twice a day at 8am and 12 noon and 1 1/2 tablets at bedtime Orally 30 days    Schizophrenia, unspecified type (H)       levothyroxine 50 MCG tablet    SYNTHROID/LEVOTHROID    90 tablet    Take 1 tablet (50 mcg) by mouth daily    Hypothyroidism due to medication       lithium 300 MG capsule     120 capsule    Take 2 capsules (600 mg) by mouth 2 times daily (with meals)    Schizophrenia, unspecified type (H), Mental retardation       NEW MED      X-vitate 40%, apply thinly, severe dry feet        pyridoxine 100 MG tablet    VITAMIN B-6    90 tablet    Take 1 tablet (100 mg) by mouth daily    Schizophrenia, unspecified type (H)       * QUEtiapine 400 MG tablet    SEROquel    60 tablet    Take 1 tablet (400 mg) by mouth 2 times daily    Schizophrenia, unspecified type (H), Mental retardation       * QUEtiapine 300 MG tablet    SEROquel    30 tablet    1 tablet Once a day at noon Orally 30 days    Schizophrenia, unspecified type (H)       trihexyphenidyl 5 MG tablet    ARTANE    60 tablet    Take 1 tablet (5 mg) by  mouth 2 times daily    Schizophrenia, unspecified type (H), Mental retardation       * Notice:  This list has 6 medication(s) that are the same as other medications prescribed for you. Read the directions carefully, and ask your doctor or other care provider to review them with you.
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
## 2017-09-19 NOTE — NURSING NOTE
"Chief Complaint   Patient presents with     Consult       Initial /71 (BP Location: Left arm, Patient Position: Sitting, Cuff Size: Adult Large)  Pulse 66  Temp 96.9  F (36.1  C) (Tympanic)  Wt 278 lb (126.1 kg)  BMI 39.89 kg/m2 Estimated body mass index is 39.89 kg/(m^2) as calculated from the following:    Height as of 8/23/17: 5' 10\" (1.778 m).    Weight as of this encounter: 278 lb (126.1 kg).  Medication Reconciliation: complete    "

## 2017-09-20 ASSESSMENT — ANXIETY QUESTIONNAIRES: GAD7 TOTAL SCORE: 11

## 2017-09-22 DIAGNOSIS — F63.81 INTERMITTENT EXPLOSIVE DISORDER: ICD-10-CM

## 2017-09-22 DIAGNOSIS — Z51.81 ENCOUNTER FOR THERAPEUTIC DRUG MONITORING: ICD-10-CM

## 2017-09-22 DIAGNOSIS — F25.0 SCHIZOAFFECTIVE DISORDER, BIPOLAR TYPE (H): ICD-10-CM

## 2017-09-22 LAB
ALBUMIN SERPL-MCNC: 4 G/DL (ref 3.4–5)
ALP SERPL-CCNC: 79 U/L (ref 40–150)
ALT SERPL W P-5'-P-CCNC: 27 U/L (ref 0–70)
AST SERPL W P-5'-P-CCNC: 19 U/L (ref 0–45)
BILIRUB DIRECT SERPL-MCNC: <0.1 MG/DL (ref 0–0.2)
BILIRUB SERPL-MCNC: 0.2 MG/DL (ref 0.2–1.3)
CHOLEST SERPL-MCNC: 175 MG/DL
HDLC SERPL-MCNC: 45 MG/DL
LDLC SERPL CALC-MCNC: 86 MG/DL
LITHIUM SERPL-SCNC: 1 MMOL/L (ref 0.6–1.2)
NONHDLC SERPL-MCNC: 130 MG/DL
PROT SERPL-MCNC: 7.4 G/DL (ref 6.8–8.8)
TRIGL SERPL-MCNC: 218 MG/DL
VALPROATE SERPL-MCNC: 99 MG/L (ref 50–100)

## 2017-09-22 PROCEDURE — 80076 HEPATIC FUNCTION PANEL: CPT | Performed by: CLINICAL NURSE SPECIALIST

## 2017-09-22 PROCEDURE — 80164 ASSAY DIPROPYLACETIC ACD TOT: CPT | Performed by: CLINICAL NURSE SPECIALIST

## 2017-09-22 PROCEDURE — 36415 COLL VENOUS BLD VENIPUNCTURE: CPT | Performed by: CLINICAL NURSE SPECIALIST

## 2017-09-22 PROCEDURE — 80061 LIPID PANEL: CPT | Performed by: CLINICAL NURSE SPECIALIST

## 2017-09-22 PROCEDURE — 80178 ASSAY OF LITHIUM: CPT | Performed by: CLINICAL NURSE SPECIALIST

## 2017-10-06 DIAGNOSIS — F20.9 SCHIZOPHRENIA, UNSPECIFIED TYPE (H): ICD-10-CM

## 2017-10-06 NOTE — TELEPHONE ENCOUNTER
clonazePAM (KLONOPIN) 0.5 MG tablet      Last Written Prescription Date: 09/06/2017  Last Fill Quantity: 60 tablet,  # refills: 0   Last Office Visit with [facility], [facility] or [facility] prescribing provider: 08/23/2017

## 2017-10-09 RX ORDER — CLONAZEPAM 0.5 MG/1
TABLET ORAL
Qty: 60 TABLET | Refills: 3 | Status: SHIPPED | OUTPATIENT
Start: 2017-10-09 | End: 2017-12-01

## 2017-10-31 ENCOUNTER — TELEPHONE (OUTPATIENT)
Dept: PSYCHIATRY | Facility: CLINIC | Age: 37
End: 2017-10-31

## 2017-10-31 DIAGNOSIS — F20.9 SCHIZOPHRENIA, UNSPECIFIED TYPE (H): ICD-10-CM

## 2017-10-31 RX ORDER — HALOPERIDOL 10 MG/1
TABLET ORAL
Qty: 105 TABLET | Refills: 2 | Status: SHIPPED | OUTPATIENT
Start: 2017-10-31 | End: 2018-01-12

## 2017-10-31 NOTE — TELEPHONE ENCOUNTER
"Refill request received for haldol.   Routing refill request to provider for review/approval because:  Need to verify refill ok. Last RX says \"orally 30 days\" with 0 refills. Other meds were given refills.          From 9/19/2017 office visit with Ander:  Impression:  Patient is reportedly stable on current medications which are continued today. Patient is on high doses of multiple mood stabilizers and antipsychotics. Plan to review records to see if other options and doses have been tried. Patient is due for several labs.      Medication side effects and alternatives reviewed.      Treatment Plan:  Continue current psychotropic medications.      Complete labs - lithium level, valproic acid level, hepatic panel, lipid profile, and EKG.     Request past psychiatric records.      Follow up in 6 months or sooner if needed.      - Recommend patient discuss medications with their pharmacist. Risks and benefits of medications discussed, including side effect profile.   - Safety plan was reviewed; to the ER as needed or call after hours crisis line; [phone]  - Education and counseling was done regarding use of medications, psychotherapy options  - Call [phone] for appointment or to speak to a nurse.   -Office hours: Monday through Thursday 8:00 am to 4:30 pm; Friday 8:00 am to Noon.   - Patient was given a copy of this Treatment Plan today.      My Practice Policy was reviewed and signed: YES         Patient will continue to be seen for ongoing consultation and stabilization.        Signed: Nirali Worthington, RN, MS, APRN                 Psychiatry  "

## 2017-12-01 DIAGNOSIS — F20.9 SCHIZOPHRENIA, UNSPECIFIED TYPE (H): ICD-10-CM

## 2017-12-01 NOTE — TELEPHONE ENCOUNTER
Controlled Substance Refill Request for Klonpin     Last [place] 10[place]9/17, 60 tablets with 3 refills, per pharmacy patient filled again in Nov     Last clinic visit: 9/19/17     Next appt: N/A    Controlled substance agreement on file: No.    Documentation in problem list reviewed:  Yes    Processing:  Fax Rx to pt's pharmacy    RX [place] reviewed:  reviewed- issue per above  [place]:  [email or web address]    Thank you!  Aliyah Osorio RN

## 2017-12-04 RX ORDER — CLONAZEPAM 0.5 MG/1
TABLET ORAL
Qty: 60 TABLET | Refills: 3 | Status: SHIPPED | OUTPATIENT
Start: 2017-12-04 | End: 2018-01-10

## 2017-12-27 DIAGNOSIS — F79 MENTAL RETARDATION: ICD-10-CM

## 2017-12-27 DIAGNOSIS — F20.9 SCHIZOPHRENIA, UNSPECIFIED TYPE (H): ICD-10-CM

## 2017-12-28 RX ORDER — QUETIAPINE FUMARATE 300 MG/1
TABLET, FILM COATED ORAL
Qty: 30 TABLET | Refills: 3 | Status: SHIPPED | OUTPATIENT
Start: 2017-12-28 | End: 2018-02-08

## 2017-12-28 RX ORDER — DIVALPROEX SODIUM 500 MG/1
TABLET, EXTENDED RELEASE ORAL
Qty: 90 TABLET | Refills: 3 | Status: SHIPPED | OUTPATIENT
Start: 2017-12-28 | End: 2018-04-18

## 2017-12-28 RX ORDER — QUETIAPINE FUMARATE 400 MG/1
TABLET, FILM COATED ORAL
Qty: 60 TABLET | Refills: 3 | Status: SHIPPED | OUTPATIENT
Start: 2017-12-28 | End: 2018-02-08

## 2017-12-28 RX ORDER — CITALOPRAM HYDROBROMIDE 40 MG/1
TABLET ORAL
Qty: 30 TABLET | Refills: 3 | Status: SHIPPED | OUTPATIENT
Start: 2017-12-28 | End: 2018-04-18

## 2017-12-28 RX ORDER — HALOPERIDOL 10 MG/1
TABLET ORAL
Qty: 105 TABLET | Refills: 2 | OUTPATIENT
Start: 2017-12-28

## 2018-01-08 NOTE — TELEPHONE ENCOUNTER
Group borja ([phone]) requesting to refill clonazapm, called into [facility] in [place], [place]. Sched return appt on 1/22/18

## 2018-01-11 ENCOUNTER — TELEPHONE (OUTPATIENT)
Dept: FAMILY MEDICINE | Facility: CLINIC | Age: 38
End: 2018-01-11

## 2018-01-11 RX ORDER — CLONAZEPAM 0.5 MG/1
TABLET ORAL
Qty: 60 TABLET | Refills: 3 | Status: SHIPPED | OUTPATIENT
Start: 2018-01-11 | End: 2018-08-13

## 2018-01-12 DIAGNOSIS — F20.9 SCHIZOPHRENIA, UNSPECIFIED TYPE (H): ICD-10-CM

## 2018-01-12 DIAGNOSIS — F79 MENTAL RETARDATION: ICD-10-CM

## 2018-01-12 RX ORDER — HALOPERIDOL 10 MG/1
TABLET ORAL
Qty: 105 TABLET | Refills: 2 | Status: SHIPPED | OUTPATIENT
Start: 2018-01-12 | End: 2018-03-19

## 2018-01-12 RX ORDER — LITHIUM CARBONATE 300 MG/1
CAPSULE ORAL
Qty: 120 CAPSULE | Refills: 0 | Status: SHIPPED | OUTPATIENT
Start: 2018-01-12 | End: 2018-02-08

## 2018-01-17 ENCOUNTER — TELEPHONE (OUTPATIENT)
Dept: PSYCHIATRY | Facility: CLINIC | Age: 38
End: 2018-01-17

## 2018-01-17 NOTE — TELEPHONE ENCOUNTER
Reason for call:  Other   Patient called regarding (reason for call): prescription  Additional comments: Patient left a voicemail that he needs refill of Clonazepam.  He has a f/u appt scheduled on 1/22.       Phone number to reach patient:  Home number on file [phone] (home)    Best Time:  any    Can we leave a detailed message on this number?  YES

## 2018-01-17 NOTE — TELEPHONE ENCOUNTER
"RN spoke to pharmacy. They stated patient's group home is requesting a 1 mg tab of Clonazepam. They are aware patient has 3 available refills of 0.5 mg tabs. The report \"they want to 1 mg tabs he uses for PRN's.\" This was apparently last prescribed by patients former psychiatry provider.     RN attempted to call group home. No staff available to speak about meds. RN tried secondary number [phone]. No one available there either.     Will discuss request with Nirali Worthington RN, MS, APRN. Med check scheduled for 2018.    Controlled Substance Refill Request for Clonazepam  Problem List Complete:  Yes  Patient Active Problem List   Diagnosis     Schizophrenia (H)     HTN (hypertension)     Mental retardation     Hypothyroidism     Hyperlipidemia LDL goal <160     Abnormal stress test     Exotropia of right eye     Morbid obesity due to excess calories (H)     Lithium use       checked in past 6 months?  Yes below  [place] and [place] checked      [employer] (Group) Date: 18   Query Report Page#: 1   Patient Rx History Report  SAURABH SAMPSON  Search Criteria: Last Name 'Saurabh' and First Name 'Brandt' and  = ' and Request Period = '17' to  ' - 6 out of 6 Recipients Selected.    Fill Date Product, Str, Form Qty Days Pt ID Prescriber Written RX# N/R* Pharm **MED+  ---------- -------------------------------- ------ ---- --------- ---------- ---------- ------------ ----- --------- ------  2017 CLONAZEPAM 0.5 MG TABLET 60.00 30 [id number] 2017 [id number] N [id number] 00.0  2017 CLONAZEPAM 0.5 MG TABLET 60.00 30 [id number] 10/09/2017 [id number] R [id number] 00.0  2017 CLONAZEPAM 0.5 MG TABLET 60.00 30 [id number] 10/09/2017 [id number] R [id number] 00.0  10/10/2017 CLONAZEPAM 0.5 MG TABLET 60.00 30 [id number] 10/09/2017 [id number] N [id number] 00.0  2017 CLONAZEPAM 0.5 MG TABLET 56.00 28 [id number] 2017 [id number] N [id number] " 00.0  08/10/2017 CLONAZEPAM 0.5 MG TABLET 56.00 28 [id number] 06/20/2017 [id number] R [id number] 00.0  07/16/2017 CLONAZEPAM 0.5 MG TABLET 56.00 28 [id number] 06/20/2017 [id number] R [id number] 00.0  06/20/2017 CLONAZEPAM 0.5 MG TABLET 60.00 30 [id number] 06/20/2017 [id number] N [id number] 00.0  05/16/2017 CLONAZEPAM 0.5 MG TABLET 60.00 30 [id number] 03/17/2017 [id number] R [id number] 00.0  04/20/2017 CLONAZEPAM 0.5 MG TABLET 60.00 30 [id number] 03/17/2017 [id number] R [id number] 00.0  03/25/2017 CLONAZEPAM 0.5 MG TABLET 60.00 30 [id number] 03/17/2017 [id number] N [id number] 00.0  02/27/2017 CLONAZEPAM 0.5 MG TABLET 60.00 30 [id number] 01/06/2017 [id number] R [id number] 00.0  02/20/2017 CLONAZEPAM 1 MG TABLET 20.00 20 [id number] 01/06/2017 [id number] N [id number] 00.0  02/01/2017 CLONAZEPAM 0.5 MG TABLET 60.00 30 [id number] 01/06/2017 [id number] R [id number] 00.0    *N/R N=New R=Refill  +MED Daily    Prescribers for prescriptions listed  ----------------------------------------------------------------------------------------------------------------------------------  [id number] NEVIN LEW [facility]; [facility], [address] , [place] [address]  [id number] JODEE LINN (MSN, CNP); [address] [place] [address]  [id number] SYMONE MANZANO; [facility], [address], [place] [address]  [id number] GIANA MONTERROSO ([facility]); [facility] [address] [facility] [address]    Pharmacies that dispensed prescriptions listed  ----------------------------------------------------------------------------------------------------------------------------------  [id number] [facility]; [address] [facility], [address], [facility] [address],    Patients that match search criteria  ----------------------------------------------------------------------------------------------------------------------------------  [id number] KARRI  ADRIÁN R,  82; [address], [facility] [address]  [id number] ZENG JESSICA-JAYLEN ADRIÁN,  81; [address] [facility] [address]  [id number] KARRI ADRIÁN,  82; [address], [facility] [address]  [id number] KARRI SAMPSON IDANIA,  81; [address], [facility] [address]  [id number] KARRI SAMPSON IDANIA,  81; [address], [facility] [address]  [id number] KARRI SAMPSON IDANIA,  81; [address], [facility] [address]  [id number] KARRI JIM IDANIA,  81; [address], [facility] [address]  **Per CDC guidance, the conversion factors and associated daily morphine milligram equivalents for drugs prescribed as part of  medication-assisted treatment for opioid use disorder should not be used to benchmark against dosage thresholds meant for opioids  prescribed for pain.  Report Disclaimers:  The report provided above is based upon the search criteria and the data provided by the dispensing entities. For more information  about any prescription, please contact the dispenser or the prescriber.  This report contains confidential information, including patient identifiers, and is not a public record. The information on this  report must be treated as protected health information and is to be disclosed to others only as authorized by applicable state  and Federal regulations.   [employer] [place] Date: 18   Query Report Page#: 2   Patient Rx History Report  KARRI SAMPSON    MED Summary  This section displays cumulative MED values by unique recipient. The MED Max value is the maximum occurrence of cumulative MED  sustained for any 3 consecutive days. This value is calculated based on prescriptions dispensed during the date range requested.  -----------------------------------------------------------------------------------------------------------------------------------  0 ADRIÁN ZENG; 1980; [address] [facility], [facility] [address]  **Per CDC guidance, the  conversion factors and associated daily morphine milligram equivalents for drugs prescribed as part of  medication-assisted treatment for opioid use disorder should not be used to benchmark against dosage thresholds meant for opioids  prescribed for pain.  Report Disclaimers:  The report provided above is based upon the search criteria and the data provided by the dispensing entities. For more information  about any prescription, please contact the dispenser or the prescriber.  This report contains confidential information, including patient identifiers, and is not a public record. The information on this  report must be treated as protected health information and is to be disclosed to others only as authorized by applicable state  and Federal regulations.     [employer] [place] Date: 18   Query Report Page#: 1   Patient Rx History Report Multiple State Query  KRISTIAN SAMPSON  Search Criteria: Last Name 'kristian' and First Name 'lester' and  = ' and Address = '' and Request Period  = ' to ' - State(s) selected- [place].      *N/R N=New R=Refill        Patients that match search criteria  ----------------------------------------------------------------------------------------------------  No patient records available    Report Disclaimers:  The report provided above is based upon the search criteria and the data provided by the dispensing entities. For more information  about any prescription, please contact the dispenser or the prescriber.  This report contains confidential information, including patient identifiers, and is not a public record. The information on this  report must be treated as protected health information and is to be disclosed to others only as authorized by applicable state  and Federal regulations.

## 2018-01-17 NOTE — TELEPHONE ENCOUNTER
I would need records from previous psychiatric provider to approve additional clonazepam (Klonopin). Thanks.

## 2018-01-24 ENCOUNTER — TELEPHONE (OUTPATIENT)
Dept: FAMILY MEDICINE | Facility: CLINIC | Age: 38
End: 2018-01-24

## 2018-01-24 DIAGNOSIS — F20.9 SCHIZOPHRENIA, UNSPECIFIED TYPE (H): ICD-10-CM

## 2018-01-24 DIAGNOSIS — F79 MENTAL RETARDATION: ICD-10-CM

## 2018-01-24 RX ORDER — TRIHEXYPHENIDYL HYDROCHLORIDE 5 MG/1
TABLET ORAL
Qty: 60 TABLET | Refills: 3 | Status: SHIPPED | OUTPATIENT
Start: 2018-01-24 | End: 2018-05-25

## 2018-01-24 NOTE — TELEPHONE ENCOUNTER
Patient lives in a group home, and yet no showed his 1/18/18 office visit.  This RN spoke with staff the previous week, and they stated they would bring him to the appointment.   Will attempt to reschedule.

## 2018-01-24 NOTE — TELEPHONE ENCOUNTER
trihexyphenidyl (ARTANE) 5 MG tablet     Last Written Prescription Date:  09/19/2017  Last Fill Quantity: 60,   # refills: 3  Last Office Visit: 09/19/2017  Future Office visit:       Routing refill request to provider for review/approval because:  Drug not on the [facility], P or [facility] refill protocol or controlled substance

## 2018-01-25 ENCOUNTER — TELEPHONE (OUTPATIENT)
Dept: FAMILY MEDICINE | Facility: CLINIC | Age: 38
End: 2018-01-25

## 2018-01-25 RX ORDER — CLONAZEPAM 1 MG/1
TABLET ORAL
Qty: 90 TABLET | Status: CANCELLED | OUTPATIENT
Start: 2018-01-25

## 2018-01-25 NOTE — TELEPHONE ENCOUNTER
Patient is on a 1 mg Clonazepam prn. He did get his medications filled by Nirali Worthington but she did not fill the 1 mg prn Clonazepam. They are requesting this. States he takes it at least twice a week. If he doesn't have it, he gets aggressive. He will request it because he is agitated. If he doesn't get it he will then blow up. Staff is requesting that Dr. Faria fill it. Please advise.    Luzmaria [place]

## 2018-01-25 NOTE — TELEPHONE ENCOUNTER
Reason for call:  Other   Patient called regarding (reason for call): call back  Additional comments: Group home called back and is requesting refill of PRN. Sounds like Nirali has never prescribed this medication, so I let her know that he likely needs to come in for a visit before this will be prescribed.  She was made aware that the patient no-showed at [facility] on 1/22, and is currently scheduled again for 2/8.  She asked that I still send the request in because the patient gets violent when he does not take this medication.     Phone number to reach patient:  Home number on file [phone] (home)    Best Time:  any    Can we leave a detailed message on this number?  YES

## 2018-01-26 NOTE — TELEPHONE ENCOUNTER
Pharmacy database does not support the patient taking this particular dose of Clonazepam 2 per week. It was last filled for a QTY of 20 02/20/2017.     Unfortunately the patient no showed 1/22/2018.    RN attempted outreach to [facility]. Detailed message left requesting medical records again. Brandt has been rescheduled to 2/8/18.

## 2018-02-02 ENCOUNTER — TELEPHONE (OUTPATIENT)
Dept: FAMILY MEDICINE | Facility: CLINIC | Age: 38
End: 2018-02-02

## 2018-02-02 DIAGNOSIS — F20.9 SCHIZOPHRENIA, UNSPECIFIED TYPE (H): Primary | ICD-10-CM

## 2018-02-02 RX ORDER — CLONAZEPAM 1 MG/1
1 TABLET ORAL DAILY PRN
Qty: 20 TABLET | Refills: 0 | Status: SHIPPED | OUTPATIENT
Start: 2018-02-02 | End: 2018-12-14

## 2018-02-02 NOTE — TELEPHONE ENCOUNTER
Spoke with  pharmacy in [place] who fills this medication for him.  Clonazepam 1 mg  Last refilled in 2/20/2017 for #20 tablets.   RX generated today for patient in Dr David lobo.   Clonazepam 1 mg prn daily as needed. #20 with no refill.   Currently takes 0.5 mg bid scheduled as well.   .anne

## 2018-02-02 NOTE — TELEPHONE ENCOUNTER
Arlen Montes De Oca from patient's Group Home is asking to have a provider review the PRN Clonazepam order. Per chart review it looks like this is historical and last filled on 9-10-12. Falguni says that it has been being refilled through the [place] pharmacy. Patient has seen a provider in [place] at [facility] named Laila Bullock, but Falguni says the PRN has been filled by [facility] provider. Falguni says the patient takes 1mg daily prn and takes 2-3 times per week, directions are different than current order. Falguni says the patient needs as has prn agitation concerns this prn medication helps with and was told the PCP could fill it. Has had another request on 1-25-18 and it was routed to Nirali Worthington with no response yet, patient has an appointment on 2-8-18 with Nirali, could you look at this in Dr. Faria's absence?   ROHIT Mustafa

## 2018-02-08 ENCOUNTER — OFFICE VISIT (OUTPATIENT)
Dept: PSYCHIATRY | Facility: CLINIC | Age: 38
End: 2018-02-08
Payer: MEDICAID

## 2018-02-08 VITALS
DIASTOLIC BLOOD PRESSURE: 70 MMHG | SYSTOLIC BLOOD PRESSURE: 118 MMHG | HEART RATE: 66 BPM | BODY MASS INDEX: 39.34 KG/M2 | WEIGHT: 281 LBS | TEMPERATURE: 98.5 F | HEIGHT: 71 IN | RESPIRATION RATE: 14 BRPM

## 2018-02-08 DIAGNOSIS — F79 MENTAL RETARDATION: ICD-10-CM

## 2018-02-08 DIAGNOSIS — F20.9 SCHIZOPHRENIA, UNSPECIFIED TYPE (H): ICD-10-CM

## 2018-02-08 DIAGNOSIS — Z51.81 ENCOUNTER FOR THERAPEUTIC DRUG MONITORING: ICD-10-CM

## 2018-02-08 PROCEDURE — 99214 OFFICE O/P EST MOD 30 MIN: CPT | Performed by: CLINICAL NURSE SPECIALIST

## 2018-02-08 PROCEDURE — 93000 ELECTROCARDIOGRAM COMPLETE: CPT | Performed by: CLINICAL NURSE SPECIALIST

## 2018-02-08 RX ORDER — LITHIUM CARBONATE 300 MG/1
CAPSULE ORAL
Qty: 120 CAPSULE | Refills: 0 | Status: SHIPPED | OUTPATIENT
Start: 2018-02-08 | End: 2018-03-01

## 2018-02-08 RX ORDER — QUETIAPINE FUMARATE 300 MG/1
TABLET, FILM COATED ORAL
Qty: 30 TABLET | Refills: 3 | Status: SHIPPED | OUTPATIENT
Start: 2018-02-08 | End: 2018-05-25

## 2018-02-08 RX ORDER — QUETIAPINE FUMARATE 400 MG/1
TABLET, FILM COATED ORAL
Qty: 60 TABLET | Refills: 3 | Status: SHIPPED | OUTPATIENT
Start: 2018-02-08 | End: 2018-05-25

## 2018-02-08 ASSESSMENT — ANXIETY QUESTIONNAIRES
1. FEELING NERVOUS, ANXIOUS, OR ON EDGE: NOT AT ALL
7. FEELING AFRAID AS IF SOMETHING AWFUL MIGHT HAPPEN: NEARLY EVERY DAY
5. BEING SO RESTLESS THAT IT IS HARD TO SIT STILL: NEARLY EVERY DAY
2. NOT BEING ABLE TO STOP OR CONTROL WORRYING: NEARLY EVERY DAY
6. BECOMING EASILY ANNOYED OR IRRITABLE: NEARLY EVERY DAY
GAD7 TOTAL SCORE: 15
IF YOU CHECKED OFF ANY PROBLEMS ON THIS QUESTIONNAIRE, HOW DIFFICULT HAVE THESE PROBLEMS MADE IT FOR YOU TO DO YOUR WORK, TAKE CARE OF THINGS AT HOME, OR GET ALONG WITH OTHER PEOPLE: NOT DIFFICULT AT ALL
3. WORRYING TOO MUCH ABOUT DIFFERENT THINGS: NEARLY EVERY DAY

## 2018-02-08 ASSESSMENT — PATIENT HEALTH QUESTIONNAIRE - PHQ9: 5. POOR APPETITE OR OVEREATING: NOT AT ALL

## 2018-02-08 NOTE — MR AVS SNAPSHOT
After Visit Summary   2/8/2018    Brandt Wiley    MRN: [id number]           Patient Information     Date Of Birth          1980        Visit Information        Provider Department      2/8/2018 12:45 PM Nirali Worthington APRN [facility]        Today's Diagnoses     Schizophrenia, unspecified type (H)        Mental retardation          Care Instructions    Treatment Plan:  Continue current psychotropic medications.     OBTAIN PREVIOUS RECORDS.     Complete EKG which was ordered 9-.     Schedule with long term psychiatric provider.     Follow up in 3 months unless scheduled with long term provider.     - Recommend patient discuss medications with their pharmacist. Risks and benefits for medications were discussed including, but not limited to, side effects.   - Safety plan was reviewed; to the ER as needed or call after hours crisis line; [phone]  - Education and counseling was done regarding use of medications, psychotherapy options  - Call [phone] for appointment or to speak to a nurse.    -Office hours: Monday through Thursday 8:00 am to 4:30 pm.          Follow-ups after your visit        Who to contact     If you have questions or need follow up information about today's clinic visit or your schedule please contact [facility] directly at [phone].  Normal or non-critical lab and imaging results will be communicated to you by MyChart, letter or phone within 4 business days after the clinic has received the results. If you do not hear from us within 7 days, please contact the clinic through [employer] or phone. If you have a critical or abnormal lab result, we will notify you by phone as soon as possible.  Submit refill requests through [employer] or call your pharmacy and they will forward the refill request to us. Please allow 3 business days for your refill to be completed.          Additional Information About Your Visit        [employer]  "Information     [employer] lets you send messages to your doctor, view your test results, renew your prescriptions, schedule appointments and more. To sign up, go to [email or web address] . Click on \"Log in\" on the left side of the screen, which will take you to the Welcome page. Then click on \"Sign up Now\" on the right side of the page.     You will be asked to enter the access code listed below, as well as some personal information. Please follow the directions to create your username and password.     Your access code is: [email or web address]  Expires: 2018  9:35 AM     Your access code will  in 90 days. If you need help or a new code, please call your [place] clinic or [phone].        Care EveryWhere ID     This is your Care EveryWhere ID. This could be used by other organizations to access your [place] medical records  [id number]        Your Vitals Were     Pulse Temperature Respirations Height BMI (Body Mass Index)       66 98.5  F (36.9  C) (Tympanic) 14 5' 10.87\" (1.8 m) 39.34 kg/m2        Blood Pressure from Last 3 Encounters:   18 118/70   17 113/71   17 120/78    Weight from Last 3 Encounters:   18 281 lb (127.5 kg)   17 278 lb (126.1 kg)   17 280 lb (127 kg)              Today, you had the following     No orders found for display         Where to get your medicines      These medications were sent to [place], [place] - [address] [place] [address]  [address] [facility] [address]    Hours:  test rx sent successfully  05  kr Phone:  [phone]     lithium 300 MG capsule    QUEtiapine 300 MG tablet    QUEtiapine 400 MG tablet          Primary Care Provider Office Phone # Fax #    Sebastien Faria [phone] [address] [place] [phone]        Equal Access to Services     [facility]: Ian Fountain, aguila vela, tomasa pacheco" elizabeth gonzalesaan ah. So [facility] [phone].    ATENCIÓN: Si karly sandhu, tiene a nj disposición servicios gratuitos de asistencia lingüística. Allyson sterling [phone].    We comply with applicable federal civil rights laws and [place] laws. We do not discriminate on the basis of race, color, national origin, age, disability, sex, sexual orientation, or gender identity.            Thank you!     Thank you for choosing [facility]  for your care. Our goal is always to provide you with excellent care. Hearing back from our patients is one way we can continue to improve our services. Please take a few minutes to complete the written survey that you may receive in the mail after your visit with us. Thank you!             Your Updated Medication List - Protect others around you: Learn how to safely use, store and throw away your medicines at www.disposemymeds.org.          This list is accurate as of 2/8/18  1:25 PM.  Always use your most recent med list.                   Brand Name Dispense Instructions for use Diagnosis    atenolol 25 MG tablet    TENORMIN    90 tablet    Take 1 tablet (25 mg) by mouth daily    Essential hypertension       citalopram 40 MG tablet    celeXA    30 tablet    Take 1 tablet (40 mg) by mouth daily    Schizophrenia, unspecified type (H), Mental retardation       * clonazePAM 0.5 MG tablet    klonoPIN    60 tablet    TAKE ONE TABLET BY MOUTH TWICE DAILY (AT 8 AM AND 8 PM)    Schizophrenia, unspecified type (H)       * clonazePAM 1 MG tablet    klonoPIN    20 tablet    Take 1 tablet (1 mg) by mouth daily as needed (for agitation)    Schizophrenia, unspecified type (H)       divalproex sodium extended-release 500 MG 24 hr tablet    DEPAKOTE ER    90 tablet    Take 3 tablets (1,500 mg) by mouth daily    Schizophrenia, unspecified type (H), Mental retardation       haloperidol 10 MG tablet    HALDOL    105 tablet    TAKE 1 tablet Twice a day at 8am and 12 noon and 1 1/2 tablets at  bedtime Orally 30 days    Schizophrenia, unspecified type (H)       levothyroxine 50 MCG tablet    SYNTHROID/LEVOTHROID    90 tablet    Take 1 tablet (50 mcg) by mouth daily    Hypothyroidism due to medication       lithium 300 MG capsule     120 capsule    Take 2 capsules (600 mg) by mouth 2 times daily (with meals)    Schizophrenia, unspecified type (H), Mental retardation       NEW MED      X-vitate 40%, apply thinly, severe dry feet        pyridoxine 100 MG tablet    VITAMIN B-6    90 tablet    Take 1 tablet (100 mg) by mouth daily    Schizophrenia, unspecified type (H)       * QUEtiapine 400 MG tablet    SEROquel    60 tablet    Take 1 tablet (400 mg) by mouth 2 times daily    Schizophrenia, unspecified type (H), Mental retardation       * QUEtiapine 300 MG tablet    SEROquel    30 tablet    1 tablet Once a day at noon Orally 30 days    Schizophrenia, unspecified type (H)       trihexyphenidyl 5 MG tablet    ARTANE    60 tablet    Take 1 tablet (5 mg) by mouth 2 times daily    Schizophrenia, unspecified type (H), Mental retardation       * Notice:  This list has 4 medication(s) that are the same as other medications prescribed for you. Read the directions carefully, and ask your doctor or other care provider to review them with you.

## 2018-02-08 NOTE — NURSING NOTE
"Chief Complaint   Patient presents with     Recheck Medication       Initial /70  Pulse 66  Temp 98.5  F (36.9  C) (Tympanic)  Resp 14  Ht 5' 10.87\" (1.8 m)  Wt 281 lb (127.5 kg)  BMI 39.34 kg/m2 Estimated body mass index is 39.34 kg/(m^2) as calculated from the following:    Height as of this encounter: 5' 10.87\" (1.8 m).    Weight as of this encounter: 281 lb (127.5 kg).    Medication Reconciliation:  complete    Delia Piña CMA (AAMA)  "

## 2018-02-08 NOTE — PROGRESS NOTES
Psychiatric Progress Note    Name: Brandt Wiley  Date: 2/8/2018  Length of Visit: Spent 30 minutes face to face with this patient, where at least 50 % of this time was spent in counseling on/or about behavior management.     MRN: [id number]      Current Outpatient Prescriptions   Medication Sig     trihexyphenidyl (ARTANE) 5 MG tablet Take 1 tablet (5 mg) by mouth 2 times daily     lithium 300 MG capsule Take 2 capsules (600 mg) by mouth 2 times daily (with meals)     haloperidol (HALDOL) 10 MG tablet TAKE 1 tablet Twice a day at 8am and 12 noon and 1 1/2 tablets at bedtime Orally 30 days     clonazePAM (KLONOPIN) 0.5 MG tablet TAKE ONE TABLET BY MOUTH TWICE DAILY (AT 8 AM AND 8 PM)     citalopram (CELEXA) 40 MG tablet Take 1 tablet (40 mg) by mouth daily     divalproex (DEPAKOTE ER) 500 MG 24 hr tablet Take 3 tablets (1,500 mg) by mouth daily     QUEtiapine (SEROQUEL) 400 MG tablet Take 1 tablet (400 mg) by mouth 2 times daily     QUEtiapine (SEROQUEL) 300 MG tablet 1 tablet Once a day at noon Orally 30 days     atenolol (TENORMIN) 25 MG tablet Take 1 tablet (25 mg) by mouth daily     levothyroxine (SYNTHROID/LEVOTHROID) 50 MCG tablet Take 1 tablet (50 mcg) by mouth daily     pyridoxine (VITAMIN B-6) 100 MG tablet Take 1 tablet (100 mg) by mouth daily     clonazePAM (KLONOPIN) 1 MG tablet Take 1 tablet (1 mg) by mouth daily as needed (for agitation)     [DISCONTINUED] atenolol (TENORMIN) 25 MG tablet Take 1 tablet (25 mg) by mouth daily DUE FOR APPOINTMENT June 2017. NO FURTHER REFILLS     NEW MED X-vitate 40%, apply thinly, severe dry feet     No current facility-administered medications for this visit.        Therapist:  Osito Gardner    PHQ-9:  PHQ-9 score:    PHQ-9 SCORE 2/8/2018   Total Score 0       JENNIFER-7:  JENNIFER-7 SCORE 9/19/2017 2/8/2018   Total Score 11 15           Interim History:  Patient returns for follow up from initial appointment appointment 9-. Patient lives in a group home. Patient  "did not show for appointment 1-. Group home staff agreed to obtain records from previous psychiatric provider - records have not been received.     Patient is accompanied by staff, Herrera and \"grandma Ana\" who is \"everybody's grandma\".     Patient reports his mood has been \"good\". Staff reports patient has been \"repetitive\" asking the same question many times. Staff reports patient is \"sometimes\" redirectable. Staff deny other concerns. Staff reports one episode of aggression towards staff. There were no injuries to either party.       Past Medical History:   Diagnosis Date     HTN (hypertension)      Hyperlipidemia      Hypothyroidism      Schizophrenia (H)        10 point ROS is negative except for those listed above.     Vital Signs:   /70  Pulse 66  Temp 98.5  F (36.9  C) (Tympanic)  Resp 14  Ht 5' 10.87\" (1.8 m)  Wt 281 lb (127.5 kg)  BMI 39.34 kg/m2    Component      Latest Ref Rng & Units 9/22/2017   Cholesterol      <200 mg/dL 175   Triglycerides      <150 mg/dL 218 (H)   HDL Cholesterol      >39 mg/dL 45   LDL Cholesterol Calculated      <100 mg/dL 86   Non HDL Cholesterol      <130 mg/dL 130 (H)     Component      Latest Ref Rng & Units 9/22/2017   Bilirubin Direct      0.0 - 0.2 mg/dL <0.1   Bilirubin Total      0.2 - 1.3 mg/dL 0.2   Albumin      3.4 - 5.0 g/dL 4.0   Protein Total      6.8 - 8.8 g/dL 7.4   Alkaline Phosphatase      40 - 150 U/L 79   ALT      0 - 70 U/L 27   AST      0 - 45 U/L 19     Component      Latest Ref Rng & Units 9/22/2017   Valproic Acid Level      50 - 100 mg/L 99     Component      Latest Ref Rng & Units 9/22/2017   Lithium Level      0.6 - 1.2 mmol/L 1.0     Mental Status Assessment:  Appearance:  Well groomed      Behavior/relationship to examiner/demeanor:  Cooperative, engaged and pleasant  Motor activity:  Normal  Gait:  Normal   Speech:  Normal in volume, articulation, coherence   Mood (subjective report):  \"Good\"  Affect (objective appearance):  " Mood congruent  Thought Process (Associations):  Logical, linear and goal directed  Thought content:  No evidence of suicidal or homicidal ideation,          no overt psychosis and                    patient does not appear to be responding to internal stimuli  Oriented to person, place, date/time   Attention Span and concentration: Intact   Memory:  Short-term memory intact and Long-term memory; Intact  Language:  Fluent   Fund of Knowledge/Intelligence:  Average  Use of language: Intact   Abstraction:  Normal  Insight:  Adequate  Judgment:  Adequate for safety    DSM DIAGNOSIS:  Intellectual Disabilites  318.0 (71) Moderate  295.70  (F25) Schizoaffective Disorder Bipolar Type  309.81 (F43.10) Posttraumatic Stress Disorder (includes Posttraumatic Stress Disorder for Children 6 Years and Younger)  Without dissociative symptoms  312.34 (F62.81) Intermittent Explosive Disorder  with panic attack    Assessment:  Patient had one anger outburst since last September, 2017. Staff denies other concerns. Staff again agree to get records from previous providers and to schedule with long term provider.     Medication side effects and alternatives were reviewed.     Treatment Plan:  Continue current psychotropic medications.     OBTAIN PREVIOUS RECORDS.     Complete EKG which was ordered 9-.     Schedule with long term psychiatric provider.     Follow up in 3 months unless scheduled with long term provider.     - Recommend patient discuss medications with their pharmacist. Risks and benefits for medications were discussed including, but not limited to, side effects.   - Safety plan was reviewed; to the ER as needed or call after hours crisis line; [phone]  - Education and counseling was done regarding use of medications, psychotherapy options  - Call [phone] for appointment or to speak to a nurse.    -Office hours: Monday through Thursday 8:00 am to 4:30 pm.   - Patient received a copy of this Treatment Plan  today.    Patient will continue to be seen for ongoing consultation and stabilization.      Signed:  Nirali Worthington RN, MS, CNS-BC

## 2018-02-09 ASSESSMENT — PATIENT HEALTH QUESTIONNAIRE - PHQ9: SUM OF ALL RESPONSES TO PHQ QUESTIONS 1-9: 0

## 2018-02-09 ASSESSMENT — ANXIETY QUESTIONNAIRES: GAD7 TOTAL SCORE: 15

## 2018-03-01 DIAGNOSIS — F79 MENTAL RETARDATION: ICD-10-CM

## 2018-03-01 DIAGNOSIS — F20.9 SCHIZOPHRENIA, UNSPECIFIED TYPE (H): ICD-10-CM

## 2018-03-01 RX ORDER — LITHIUM CARBONATE 300 MG/1
CAPSULE ORAL
Qty: 120 CAPSULE | Refills: 0 | Status: SHIPPED | OUTPATIENT
Start: 2018-03-01 | End: 2018-04-18

## 2018-03-01 RX ORDER — HALOPERIDOL 10 MG/1
TABLET ORAL
Qty: 105 TABLET | Refills: 2 | OUTPATIENT
Start: 2018-03-01

## 2018-03-01 NOTE — TELEPHONE ENCOUNTER
Last OV 2/8/18    Routing refill request to provider for review/approval because:  Drug not on the [facility] refill protocol     Zain Solo RN

## 2018-03-19 DIAGNOSIS — F20.9 SCHIZOPHRENIA, UNSPECIFIED TYPE (H): ICD-10-CM

## 2018-03-19 RX ORDER — HALOPERIDOL 10 MG/1
TABLET ORAL
Qty: 105 TABLET | Refills: 2 | Status: SHIPPED | OUTPATIENT
Start: 2018-03-19 | End: 2018-05-25

## 2018-04-18 DIAGNOSIS — F79 MENTAL RETARDATION: ICD-10-CM

## 2018-04-18 DIAGNOSIS — F20.9 SCHIZOPHRENIA, UNSPECIFIED TYPE (H): ICD-10-CM

## 2018-04-18 RX ORDER — DIVALPROEX SODIUM 500 MG/1
TABLET, EXTENDED RELEASE ORAL
Qty: 90 TABLET | Refills: 3 | Status: SHIPPED | OUTPATIENT
Start: 2018-04-18 | End: 2018-05-25

## 2018-04-18 RX ORDER — CITALOPRAM HYDROBROMIDE 40 MG/1
TABLET ORAL
Qty: 30 TABLET | Refills: 3 | Status: SHIPPED | OUTPATIENT
Start: 2018-04-18 | End: 2018-08-17

## 2018-04-18 RX ORDER — LITHIUM CARBONATE 300 MG/1
CAPSULE ORAL
Qty: 120 CAPSULE | Refills: 0 | Status: SHIPPED | OUTPATIENT
Start: 2018-04-18 | End: 2018-05-25

## 2018-04-18 NOTE — TELEPHONE ENCOUNTER
Eric left requesting a return call from group home staff.    He needs labs.   Did they find him a new long-term psychiatry provider?  Does he need a referral for this?  If he has not yet, we need him scheduled with Ander in May, as she will continue to bridge until long-term psychiatry can be arranged.     Group home staff was also supposed to get the previous records from psychiatry to [facility].

## 2018-04-23 ENCOUNTER — TELEPHONE (OUTPATIENT)
Dept: PSYCHIATRY | Facility: CLINIC | Age: 38
End: 2018-04-23

## 2018-05-15 DIAGNOSIS — F79 MENTAL RETARDATION: ICD-10-CM

## 2018-05-15 DIAGNOSIS — F20.9 SCHIZOPHRENIA, UNSPECIFIED TYPE (H): ICD-10-CM

## 2018-05-15 NOTE — TELEPHONE ENCOUNTER
RN attempted outreach again to group [facility]. Voicemail left. They have not returned our calls. Patient is supposed to be transferring care to a long-term psychiatry provider.     RN left message for pharmacy requesting a call back. They may have a better number for the group [facility] staff.

## 2018-05-21 RX ORDER — LITHIUM CARBONATE 300 MG/1
CAPSULE ORAL
Qty: 120 CAPSULE | Refills: 0 | OUTPATIENT
Start: 2018-05-21

## 2018-05-22 NOTE — TELEPHONE ENCOUNTER
Pharmacy called requesting refills. They did not get my message from 5/15/2018.    Pharmacist stated she has no alternative numbers to reach the group home, but the number we have is for the lead group home staff. She will try to call the group home.     May need to bridge refills. They still don't have an appointment scheduled (limited number available) and were to f/u with a new psychiatry provider.

## 2018-05-25 ENCOUNTER — OFFICE VISIT (OUTPATIENT)
Dept: PSYCHIATRY | Facility: CLINIC | Age: 38
End: 2018-05-25
Payer: MEDICAID

## 2018-05-25 VITALS
BODY MASS INDEX: 38.95 KG/M2 | DIASTOLIC BLOOD PRESSURE: 76 MMHG | TEMPERATURE: 98.5 F | SYSTOLIC BLOOD PRESSURE: 128 MMHG | WEIGHT: 278.25 LBS | HEIGHT: 71 IN | HEART RATE: 101 BPM

## 2018-05-25 DIAGNOSIS — F79 MENTAL RETARDATION: ICD-10-CM

## 2018-05-25 DIAGNOSIS — F20.9 SCHIZOPHRENIA, UNSPECIFIED TYPE (H): ICD-10-CM

## 2018-05-25 DIAGNOSIS — F25.0 SCHIZOAFFECTIVE DISORDER, BIPOLAR TYPE (H): Primary | ICD-10-CM

## 2018-05-25 PROCEDURE — 99214 OFFICE O/P EST MOD 30 MIN: CPT | Performed by: CLINICAL NURSE SPECIALIST

## 2018-05-25 RX ORDER — QUETIAPINE FUMARATE 400 MG/1
TABLET, FILM COATED ORAL
Qty: 60 TABLET | Refills: 3 | Status: SHIPPED | OUTPATIENT
Start: 2018-05-25 | End: 2018-08-17

## 2018-05-25 RX ORDER — HALOPERIDOL 10 MG/1
TABLET ORAL
Qty: 105 TABLET | Refills: 2 | Status: SHIPPED | OUTPATIENT
Start: 2018-05-25 | End: 2018-08-17

## 2018-05-25 RX ORDER — DIVALPROEX SODIUM 500 MG/1
TABLET, EXTENDED RELEASE ORAL
Qty: 90 TABLET | Refills: 3 | Status: SHIPPED | OUTPATIENT
Start: 2018-05-25 | End: 2018-08-17

## 2018-05-25 RX ORDER — QUETIAPINE FUMARATE 300 MG/1
TABLET, FILM COATED ORAL
Qty: 30 TABLET | Refills: 3 | Status: SHIPPED | OUTPATIENT
Start: 2018-05-25 | End: 2018-08-17

## 2018-05-25 RX ORDER — LITHIUM CARBONATE 300 MG/1
CAPSULE ORAL
Qty: 120 CAPSULE | Refills: 0 | Status: SHIPPED | OUTPATIENT
Start: 2018-05-25 | End: 2018-06-18

## 2018-05-25 RX ORDER — TRIHEXYPHENIDYL HYDROCHLORIDE 5 MG/1
TABLET ORAL
Qty: 60 TABLET | Refills: 3 | Status: SHIPPED | OUTPATIENT
Start: 2018-05-25 | End: 2018-06-20

## 2018-05-25 ASSESSMENT — ANXIETY QUESTIONNAIRES
3. WORRYING TOO MUCH ABOUT DIFFERENT THINGS: NOT AT ALL
6. BECOMING EASILY ANNOYED OR IRRITABLE: NOT AT ALL
7. FEELING AFRAID AS IF SOMETHING AWFUL MIGHT HAPPEN: NOT AT ALL
2. NOT BEING ABLE TO STOP OR CONTROL WORRYING: NEARLY EVERY DAY
IF YOU CHECKED OFF ANY PROBLEMS ON THIS QUESTIONNAIRE, HOW DIFFICULT HAVE THESE PROBLEMS MADE IT FOR YOU TO DO YOUR WORK, TAKE CARE OF THINGS AT HOME, OR GET ALONG WITH OTHER PEOPLE: NOT DIFFICULT AT ALL
GAD7 TOTAL SCORE: 5
1. FEELING NERVOUS, ANXIOUS, OR ON EDGE: MORE THAN HALF THE DAYS
5. BEING SO RESTLESS THAT IT IS HARD TO SIT STILL: NOT AT ALL
4. TROUBLE RELAXING: NOT AT ALL

## 2018-05-25 NOTE — PROGRESS NOTES
Psychiatric Progress Note    Name: Brandt Wiley  Date: 5/25/2018  Length of Visit: Spent 30 minutes face to face with this patient, where at least 50 % of this time was spent in counseling on/or about fun activities.     MRN: [id number]      Current Outpatient Prescriptions   Medication Sig     atenolol (TENORMIN) 25 MG tablet Take 1 tablet (25 mg) by mouth daily     citalopram (CELEXA) 40 MG tablet Take 1 tablet (40 mg) by mouth daily     clonazePAM (KLONOPIN) 0.5 MG tablet TAKE ONE TABLET BY MOUTH TWICE DAILY (AT 8 AM AND 8 PM)     clonazePAM (KLONOPIN) 1 MG tablet Take 1 tablet (1 mg) by mouth daily as needed (for agitation)     divalproex sodium extended-release (DEPAKOTE ER) 500 MG 24 hr tablet Take 3 tablets (1,500 mg) by mouth daily     haloperidol (HALDOL) 10 MG tablet TAKE 1 tablet Twice a day at 8am and 12 noon and 1 1/2 tablets at bedtime Orally 30 days     levothyroxine (SYNTHROID/LEVOTHROID) 50 MCG tablet Take 1 tablet (50 mcg) by mouth daily     lithium 300 MG capsule Take 2 capsules (600 mg) by mouth 2 times daily (with meals)     NEW MED X-vitate 40%, apply thinly, severe dry feet     pyridoxine (VITAMIN B-6) 100 MG tablet Take 1 tablet (100 mg) by mouth daily     QUEtiapine (SEROQUEL) 300 MG tablet 1 tablet Once a day at noon Orally 30 days     QUEtiapine (SEROQUEL) 400 MG tablet Take 1 tablet (400 mg) by mouth 2 times daily     trihexyphenidyl (ARTANE) 5 MG tablet Take 1 tablet (5 mg) by mouth 2 times daily     No current facility-administered medications for this visit.        Therapist:  Osito Gardner    PHQ-9:  PHQ-9 score:    PHQ-9 SCORE 2/8/2018   Total Score 0       JENNIFER-7:  JENNIFER-7 SCORE 9/19/2017 2/8/2018   Total Score 11 15           Interim History:  Patient returns for follow up from appointment 2-8-2018. No medication changes were made. Patient has been repeatedly asked to get previous psychiatric records and to schedule with a long term psychiatric provider. Several labs were  "ordered; however, have not been drawn.     Patient is accompanied by [facility]. Staff and paperwork deny there are any concerns.     Patient reports \"one small incident\" when he swore at a staff member. Patient reports doing well on the current medications.       Past Medical History:   Diagnosis Date     HTN (hypertension)      Hyperlipidemia      Hypothyroidism      Schizophrenia (H)        10 point ROS is negative except for those listed above.     Vital Signs:   /76  Pulse 101  Temp 98.5  F (36.9  C) (Tympanic)  Ht 5' 10.87\" (1.8 m)  Wt 278 lb 4 oz (126.2 kg)  BMI 38.95 kg/m2      Mental Status Assessment:  Appearance:  Well groomed      Behavior/relationship to examiner/demeanor:  Cooperative, engaged and pleasant  Motor activity:  Normal  Gait:  Normal   Speech:  Normal in volume, articulation, coherence   Mood (subjective report):  \"Good\"  Affect (objective appearance):  Mood congruent  Thought Process (Associations):  Logical, linear and goal directed  Thought content:  No evidence of suicidal or homicidal ideation,          no overt psychosis and                    patient does not appear to be responding to internal stimuli  Oriented to person, place, date/time   Attention Span and concentration: Intact   Memory:  Short-term memory intact and Long-term memory; Intact  Language:  Fluent   Fund of Knowledge/Intelligence:  Average  Use of language: Intact   Abstraction:  Normal  Insight:  Adequate  Judgment:  Adequate for safety    DSM DIAGNOSIS:  Intellectual Disabilites  318.0 (71) Moderate  295.70  (F25) Schizoaffective Disorder Bipolar Type  309.81 (F43.10) Posttraumatic Stress Disorder (includes Posttraumatic Stress Disorder for Children 6 Years and Younger)  Without dissociative symptoms  312.34 (F62.81) Intermittent Explosive Disorder  with panic attack    Assessment:  Patient's mood is stable. No medication side effects reported. Staff report patient is \"doing good\". Labs " have not been completed and staff is repeatedly reminded to do so. Patient will need long term psychiatry which has repeatedly been stressed. Referral placed to assist in this.     Medication side effects and alternatives were reviewed.     Treatment Plan:    Continue current psychotropic medications.     Complete fasting labs.     Schedule with long term psychiatric provider. Referral placed. Patient and staff are aware of my departure from this clinic.       - Recommend patient discuss medications with their pharmacist. Risks and benefits for medications were discussed including, but not limited to, side effects.   - Safety plan was reviewed; to the ER as needed or call after hours crisis line; [phone]  - Education and counseling was done regarding use of medications, psychotherapy options  - Call [phone] for appointment or to speak to a nurse.    -Office hours: Monday through Thursday 8:00 am to 4:30 pm.   - Patient received a copy of this Treatment Plan today.    The patient is being referred to long term community psychiatry care and provider will provide bridging until patient is established with new community provider.       Signed:  Nirali Worthington, RN, MS, CNS-BC

## 2018-05-25 NOTE — PATIENT INSTRUCTIONS
Treatment Plan:    Continue current psychotropic medications.     Complete fasting labs.     Schedule with long term psychiatric provider. Referral placed. Patient and staff are aware of my departure from this clinic.       - Recommend patient discuss medications with their pharmacist. Risks and benefits for medications were discussed including, but not limited to, side effects.   - Safety plan was reviewed; to the ER as needed or call after hours crisis line; [phone]  - Education and counseling was done regarding use of medications, psychotherapy options  - Call [phone] for appointment or to speak to a nurse.

## 2018-05-25 NOTE — MR AVS SNAPSHOT
After Visit Summary   5/25/2018    Brandt Wiley    MRN: [id number]           Patient Information     Date Of Birth          1980        Visit Information        Provider Department      5/25/2018 8:45 AM Nirali Worthington APRN [facility]        Today's Diagnoses     Schizoaffective disorder, bipolar type (H)    -  1    Schizophrenia, unspecified type (H)        Mental retardation          Care Instructions    Treatment Plan:    Continue current psychotropic medications.     Complete fasting labs.     Schedule with long term psychiatric provider. Referral placed. Patient and staff are aware of my departure from this clinic.       - Recommend patient discuss medications with their pharmacist. Risks and benefits for medications were discussed including, but not limited to, side effects.   - Safety plan was reviewed; to the ER as needed or call after hours crisis line; [phone]  - Education and counseling was done regarding use of medications, psychotherapy options  - Call [phone] for appointment or to speak to a nurse.              Follow-ups after your visit        Additional Services     MENTAL HEALTH REFERRAL  - Adult; Psychiatry and Medication Management; Psychiatry; Other: [facility] [phone]; We will contact you to schedule the appointment or please call with any questions       All scheduling is subject to the client's specific insurance plan & benefits, provider/location availability, and provider clinical specialities.  Please arrive 15 minutes early for your first appointment and bring your completed paperwork.    Please be aware that coverage of these services is subject to the terms and limitations of your health insurance plan.  Call member services at your health plan with any benefit or coverage questions.                            Who to contact     If you have questions or need follow up information about today's clinic  "visit or your schedule please contact [facility] directly at [phone].  Normal or non-critical lab and imaging results will be communicated to you by MyChart, letter or phone within 4 business days after the clinic has received the results. If you do not hear from us within 7 days, please contact the clinic through MyChart or phone. If you have a critical or abnormal lab result, we will notify you by phone as soon as possible.  Submit refill requests through [employer] or call your pharmacy and they will forward the refill request to us. Please allow 3 business days for your refill to be completed.          Additional Information About Your Visit        Care EveryWhere ID     This is your Care EveryWhere ID. This could be used by other organizations to access your [place] medical records  [id number]        Your Vitals Were     Pulse Temperature Height BMI (Body Mass Index)          101 98.5  F (36.9  C) (Tympanic) 5' 10.87\" (1.8 m) 38.95 kg/m2         Blood Pressure from Last 3 Encounters:   05/25/18 128/76   02/08/18 118/70   09/19/17 113/71    Weight from Last 3 Encounters:   05/25/18 278 lb 4 oz (126.2 kg)   02/08/18 281 lb (127.5 kg)   09/19/17 278 lb (126.1 kg)              We Performed the Following     MENTAL HEALTH REFERRAL  - Adult; Psychiatry and Medication Management; Psychiatry; Other: [facility] [phone]; We will contact you to schedule the appointment or please call with any questions          Where to get your medicines      These medications were sent to [facility] [place] - [address]  [address] [address]    Hours:  test rx sent successfully  2/8/05   Phone:  [phone]     divalproex sodium extended-release 500 MG 24 hr tablet    haloperidol 10 MG tablet    lithium 300 MG capsule    QUEtiapine 300 MG tablet    QUEtiapine 400 MG tablet    trihexyphenidyl 5 MG tablet          Primary Care " Provider Office Phone # Fax #    Sebastien Faria [phone]       [address] [place] [id number]        Equal Access to Services     ABBIESHANNAN MELISSA : Ian andres sneed ramu [place], wagersonda luqtim, qaybta kaalmada yonny, tomasa johnson labrensilvia mahajan. So [facility] [phone].    ATENCIÓN: Si habla español, tiene a nj disposición servicios gratuitos de asistencia lingüística. Llame al [phone].    We comply with applicable federal civil rights laws and [place] laws. We do not discriminate on the basis of race, color, national origin, age, disability, sex, sexual orientation, or gender identity.            Thank you!     Thank you for choosing [facility]  for your care. Our goal is always to provide you with excellent care. Hearing back from our patients is one way we can continue to improve our services. Please take a few minutes to complete the written survey that you may receive in the mail after your visit with us. Thank you!             Your Updated Medication List - Protect others around you: Learn how to safely use, store and throw away your medicines at www.disposemymeds.org.          This list is accurate as of 5/25/18  9:14 AM.  Always use your most recent med list.                   Brand Name Dispense Instructions for use Diagnosis    atenolol 25 MG tablet    TENORMIN    90 tablet    Take 1 tablet (25 mg) by mouth daily    Essential hypertension       citalopram 40 MG tablet    celeXA    30 tablet    Take 1 tablet (40 mg) by mouth daily    Schizophrenia, unspecified type (H), Mental retardation       * clonazePAM 0.5 MG tablet    klonoPIN    60 tablet    TAKE ONE TABLET BY MOUTH TWICE DAILY (AT 8 AM AND 8 PM)    Schizophrenia, unspecified type (H)       * clonazePAM 1 MG tablet    klonoPIN    20 tablet    Take 1 tablet (1 mg) by mouth daily as needed (for agitation)    Schizophrenia, unspecified type (H)       divalproex sodium extended-release  500 MG 24 hr tablet    DEPAKOTE ER    90 tablet    Take 3 tablets (1,500 mg) by mouth daily    Schizophrenia, unspecified type (H), Mental retardation       haloperidol 10 MG tablet    HALDOL    105 tablet    TAKE 1 tablet Twice a day at 8am and 12 noon and 1 1/2 tablets at bedtime Orally 30 days    Schizophrenia, unspecified type (H)       levothyroxine 50 MCG tablet    SYNTHROID/LEVOTHROID    90 tablet    Take 1 tablet (50 mcg) by mouth daily    Hypothyroidism due to medication       lithium 300 MG capsule     120 capsule    Take 2 capsules (600 mg) by mouth 2 times daily (with meals)    Schizophrenia, unspecified type (H), Mental retardation       NEW MED      X-vitate 40%, apply thinly, severe dry feet        pyridoxine 100 MG tablet    VITAMIN B-6    90 tablet    Take 1 tablet (100 mg) by mouth daily    Schizophrenia, unspecified type (H)       * QUEtiapine 400 MG tablet    SEROquel    60 tablet    Take 1 tablet (400 mg) by mouth 2 times daily    Schizophrenia, unspecified type (H), Mental retardation       * QUEtiapine 300 MG tablet    SEROquel    30 tablet    1 tablet Once a day at noon Orally 30 days    Schizophrenia, unspecified type (H)       trihexyphenidyl 5 MG tablet    ARTANE    60 tablet    Take 1 tablet (5 mg) by mouth 2 times daily    Schizophrenia, unspecified type (H), Mental retardation       * Notice:  This list has 4 medication(s) that are the same as other medications prescribed for you. Read the directions carefully, and ask your doctor or other care provider to review them with you.

## 2018-05-26 ASSESSMENT — ANXIETY QUESTIONNAIRES: GAD7 TOTAL SCORE: 5

## 2018-05-26 ASSESSMENT — PATIENT HEALTH QUESTIONNAIRE - PHQ9: SUM OF ALL RESPONSES TO PHQ QUESTIONS 1-9: 0

## 2018-06-08 ENCOUNTER — TELEPHONE (OUTPATIENT)
Dept: FAMILY MEDICINE | Facility: CLINIC | Age: 38
End: 2018-06-08

## 2018-06-08 NOTE — TELEPHONE ENCOUNTER
Contacted pharmacy.  Advised patient needs to have care established with another provider and then we will send in a bridge order to cover the patient until that appointment.  Pharmacist was going to contact group home and let them know we would need this information.

## 2018-06-18 ENCOUNTER — TELEPHONE (OUTPATIENT)
Dept: PSYCHIATRY | Facility: CLINIC | Age: 38
End: 2018-06-18

## 2018-06-18 DIAGNOSIS — F20.9 SCHIZOPHRENIA, UNSPECIFIED TYPE (H): ICD-10-CM

## 2018-06-18 DIAGNOSIS — F79 MENTAL RETARDATION: ICD-10-CM

## 2018-06-18 RX ORDER — LITHIUM CARBONATE 300 MG/1
CAPSULE ORAL
Qty: 120 CAPSULE | Refills: 0 | Status: SHIPPED | OUTPATIENT
Start: 2018-06-18 | End: 2018-06-20

## 2018-06-18 NOTE — TELEPHONE ENCOUNTER
RN spoke to pharmacy. Provider gave 90 + refills of all meds except the Xenia. RN asked pharmacy to f/u with PCP or new psychiatry provider on this med.

## 2018-06-18 NOTE — TELEPHONE ENCOUNTER
Requested Prescriptions   Pending Prescriptions Disp Refills     lithium 300 MG capsule 120 capsule 0     Sig: Take 2 capsules (600 mg) by mouth 2 times daily (with meals)    There is no refill protocol information for this order        lithium 300 MG capsule  Last Written Prescription Date:  05/25/2018  Last Fill Quantity: 120 capsule,  # refills: 0   Last office visit: 8/23/2017 with prescribing provider:  SHOBHA Faria   Future Office Visit:      Petty BARAJAS (R) (M)

## 2018-06-18 NOTE — TELEPHONE ENCOUNTER
Reason for call:  Medication   If this is a refill request, has the caller requested the refill from the pharmacy already? Yes  Will the patient be using a [facility]? No    Name of the medication requested: lithium    Other request: meka Wolfe with [facility], ph. [phone] called regarding refill for lithium    Phone number to reach patient:  Other phone number:  [phone] (pharmacy)        Can we leave a detailed message on this number?  YES

## 2018-06-18 NOTE — TELEPHONE ENCOUNTER
Requested Prescriptions   Pending Prescriptions Disp Refills     lithium 300 MG capsule 120 capsule 0     Sig: Take 2 capsules (600 mg) by mouth 2 times daily (with meals)    There is no refill protocol information for this order        Petty BARAJAS (IDANIA) (M)

## 2018-06-19 DIAGNOSIS — F20.9 SCHIZOPHRENIA, UNSPECIFIED TYPE (H): ICD-10-CM

## 2018-06-20 DIAGNOSIS — F20.9 SCHIZOPHRENIA, UNSPECIFIED TYPE (H): ICD-10-CM

## 2018-06-20 DIAGNOSIS — F79 MENTAL RETARDATION: ICD-10-CM

## 2018-06-20 RX ORDER — VITAMIN E 268 MG
CAPSULE ORAL
Qty: 90 TABLET | Refills: 0 | Status: SHIPPED | OUTPATIENT
Start: 2018-06-20 | End: 2018-07-16

## 2018-06-20 NOTE — TELEPHONE ENCOUNTER
Requested Prescriptions   Pending Prescriptions Disp Refills     SM VITAMIN B-6 100 MG TABS [Pharmacy Med Name: VITAMIN B-6 100 MG TABLET]       Sig: Take 1 tablet (100 mg) by mouth daily    There is no refill protocol information for this order        Last Written Prescription Date:  6/15/17  Last Fill Quantity: 90,  # refills: 3   Last office visit: 8/23/2017 with prescribing provider:     Future Office Visit:

## 2018-06-27 RX ORDER — LITHIUM CARBONATE 300 MG/1
CAPSULE ORAL
Qty: 120 CAPSULE | Refills: 0 | Status: SHIPPED | OUTPATIENT
Start: 2018-06-27 | End: 2018-07-16

## 2018-06-27 RX ORDER — TRIHEXYPHENIDYL HYDROCHLORIDE 5 MG/1
TABLET ORAL
Qty: 60 TABLET | Refills: 3 | Status: SHIPPED | OUTPATIENT
Start: 2018-06-27 | End: 2018-09-12

## 2018-07-02 ENCOUNTER — OFFICE VISIT (OUTPATIENT)
Dept: FAMILY MEDICINE | Facility: CLINIC | Age: 38
End: 2018-07-02
Payer: MEDICAID

## 2018-07-02 VITALS
HEART RATE: 58 BPM | SYSTOLIC BLOOD PRESSURE: 135 MMHG | HEIGHT: 72 IN | RESPIRATION RATE: 18 BRPM | BODY MASS INDEX: 36.98 KG/M2 | DIASTOLIC BLOOD PRESSURE: 75 MMHG | TEMPERATURE: 98.4 F | WEIGHT: 273 LBS

## 2018-07-02 DIAGNOSIS — L84 CORNS AND CALLOSITIES: Primary | ICD-10-CM

## 2018-07-02 DIAGNOSIS — M21.612 BUNION, LEFT: ICD-10-CM

## 2018-07-02 PROCEDURE — 99213 OFFICE O/P EST LOW 20 MIN: CPT | Performed by: FAMILY MEDICINE

## 2018-07-02 NOTE — NURSING NOTE
"Chief Complaint   Patient presents with     Toe Pain       Initial /75 (Cuff Size: Adult Large)  Pulse 58  Temp 98.4  F (36.9  C) (Tympanic)  Resp 18  Ht 5' 11.5\" (1.816 m)  Wt 273 lb (123.8 kg)  BMI 37.55 kg/m2 Estimated body mass index is 37.55 kg/(m^2) as calculated from the following:    Height as of this encounter: 5' 11.5\" (1.816 m).    Weight as of this encounter: 273 lb (123.8 kg).      Health Maintenance that is potentially due pending provider review:  NONE    n/a    Is there anyone who you would like to be able to receive your results? Not Applicable  If yes have patient fill out CASIMIRO    "

## 2018-07-02 NOTE — PROGRESS NOTES
SUBJECTIVE:   Brandt Wiley is a 38 year old male who presents to clinic today for the following health issues:      Sore on toe       Duration: about 2  month     Description (location/character/radiation): Left foot - big toe area     Intensity:  moderate    Accompanying signs and symptoms: Has been wearing tighter shoes and the pain has gotten worse     History (similar episodes/previous evaluation): None    Precipitating or alleviating factors: None    Therapies tried and outcome: Got new tennis shoes that aren't tight          Problem list and histories reviewed & adjusted, as indicated.  Additional history: as documented    Patient Active Problem List   Diagnosis     Schizophrenia (H)     HTN (hypertension)     Mental retardation     Hypothyroidism     Hyperlipidemia LDL goal <160     Abnormal stress test     Exotropia of right eye     Morbid obesity due to excess calories (H)     Lithium use     History reviewed. No pertinent surgical history.    Social History   Substance Use Topics     Smoking status: Never Smoker     Smokeless tobacco: Never Used     Alcohol use No     Family History   Problem Relation Age of Onset     Diabetes No family hx of          Current Outpatient Prescriptions   Medication Sig Dispense Refill     atenolol (TENORMIN) 25 MG tablet Take 1 tablet (25 mg) by mouth daily 90 tablet 3     citalopram (CELEXA) 40 MG tablet Take 1 tablet (40 mg) by mouth daily 30 tablet 3     clonazePAM (KLONOPIN) 0.5 MG tablet TAKE ONE TABLET BY MOUTH TWICE DAILY (AT 8 AM AND 8 PM) 60 tablet 3     clonazePAM (KLONOPIN) 1 MG tablet Take 1 tablet (1 mg) by mouth daily as needed (for agitation) 20 tablet 0     divalproex sodium extended-release (DEPAKOTE ER) 500 MG 24 hr tablet Take 3 tablets (1,500 mg) by mouth daily 90 tablet 3     haloperidol (HALDOL) 10 MG tablet TAKE 1 tablet Twice a day at 8am and 12 noon and 1 1/2 tablets at bedtime Orally 30 days 105 tablet 2     levothyroxine (SYNTHROID/LEVOTHROID)  "50 MCG tablet Take 1 tablet (50 mcg) by mouth daily 90 tablet 3     lithium 300 MG capsule Take 2 capsules (600 mg) by mouth 2 times daily (with meals) 120 capsule 0     QUEtiapine (SEROQUEL) 300 MG tablet 1 tablet Once a day at noon Orally 30 days 30 tablet 3     QUEtiapine (SEROQUEL) 400 MG tablet Take 1 tablet (400 mg) by mouth 2 times daily 60 tablet 3     SM VITAMIN B-6 100 MG TABS Take 1 tablet (100 mg) by mouth daily 90 tablet 0     trihexyphenidyl (ARTANE) 5 MG tablet Take 1 tablet (5 mg) by mouth 2 times daily 60 tablet 3     NEW MED X-vitate 40%, apply thinly, severe dry feet       Allergies   Allergen Reactions     Caffeine      Chocolate      Recent Labs   Lab Test  09/22/17   0821  08/23/17   1433  01/18/16   0840  02/25/15   0855   A1C   --   4.7  4.9  4.9   LDL  86  109*  98  116   HDL  45  44  45  47   TRIG  218*  227*  114  122   ALT  27  26  36  22   CR   --   0.79  0.81  0.76   GFRESTIMATED   --   >90  >90  Non African American GFR Calc    >90  Non  GFR Calc     GFRESTBLACK   --   >90  >90  African American GFR Calc    >90   GFR Calc     POTASSIUM   --   4.1  4.2  4.2   TSH   --   1.39  1.84  1.06      BP Readings from Last 3 Encounters:   07/02/18 135/75   05/25/18 128/76   02/08/18 118/70    Wt Readings from Last 3 Encounters:   07/02/18 273 lb (123.8 kg)   05/25/18 278 lb 4 oz (126.2 kg)   02/08/18 281 lb (127.5 kg)                  Labs reviewed in EPIC    Reviewed and updated as needed this visit by clinical staff       Reviewed and updated as needed this visit by Provider         ROS:  Constitutional, HEENT, cardiovascular, pulmonary, gi and gu systems are negative, except as otherwise noted.    OBJECTIVE:     /75 (Cuff Size: Adult Large)  Pulse 58  Temp 98.4  F (36.9  C) (Tympanic)  Resp 18  Ht 5' 11.5\" (1.816 m)  Wt 273 lb (123.8 kg)  BMI 37.55 kg/m2  Body mass index is 37.55 kg/(m^2).  GENERAL: healthy, alert and no distress  NECK: no " adenopathy, no asymmetry, masses, or scars and thyroid normal to palpation  RESP: lungs clear to auscultation - no rales, rhonchi or wheezes  CV: regular rates and rhythm, normal S1 S2, no S3 or S4 and no murmur, click or rub  MS: left sided bunion and significant callous plantar surface bilaterally, pulses 3+  NEURO: Normal strength and tone, mentation intact and speech normal      ASSESSMENT/PLAN:         ICD-10-CM    1. Corns and callosities L84 PODIATRY/FOOT & ANKLE SURGERY REFERRAL   2. Bunion, left M21.612      Home care explained, suggested to continue wearing shoes that have more toe room, continue regular moisturizers.  Podiatry referral placed for further review recommendations.  All questions answered.      Patient Instructions     Treating Corns and Calluses  If your corns or calluses are mild, reducing friction may help. Different shoes, moleskin patches, or soft pads may be all the treatment you need. In more severe cases, treating tissue buildup may require your doctor s care. Sometimes custom-made shoe inserts (orthotics) or special pads are prescribed to reduce friction and pressure.    Change shoes  If you have corns, your doctor may suggest wearing shoes that have more toe room. This way, buckled joints are less likely to be pinched against the top of the shoe. If you have calluses, wearing a cushioned insole, arch support, or heel counter can help reduce friction.  Visit your doctor  In some cases, your doctor may trim away the outer layers of skin that make up the corn or callus. For a painful corn, medicine may be injected beneath the built-up tissue.  Wear orthotics  Orthotics are specially made to meet the needs of your feet. They cushion calluses or divert pressure away from these problem areas. Worn as directed, orthotics help limit existing problems and prevent new ones from forming.  If you need surgery  If a bone or joint is out of place, certain parts of your foot may be under too much  pressure. This can cause severe corns and calluses. In such cases, surgery may be the best way to correct the problem.  Outpatient procedures  In most cases, surgery to improve bone position is an outpatient procedure. Your doctor may cut away excess bone, reposition prominent bones, or even fuse joints. Sometimes tendons or ligaments are cut to reduce tension on a bone or joint. Your doctor will talk with you about the procedure that is best suited to your needs.  Date Last Reviewed: 7/1/2016 2000-2017 [employer]. [address] [place], [place] [id number]. All rights reserved. This information is not intended as a substitute for professional medical care. Always follow your healthcare professional's instructions.            Ap Andres MD  [facility]

## 2018-07-02 NOTE — MR AVS SNAPSHOT
After Visit Summary   7/2/2018    Brandt Wiley    MRN: [id number]           Patient Information     Date Of Birth          1980        Visit Information        Provider Department      7/2/2018 4:20 PM Ap Andres MD [facility]        Today's Diagnoses     Corns and callosities    -  1      Care Instructions      Treating Corns and Calluses  If your corns or calluses are mild, reducing friction may help. Different shoes, moleskin patches, or soft pads may be all the treatment you need. In more severe cases, treating tissue buildup may require your doctor s care. Sometimes custom-made shoe inserts (orthotics) or special pads are prescribed to reduce friction and pressure.    Change shoes  If you have corns, your doctor may suggest wearing shoes that have more toe room. This way, buckled joints are less likely to be pinched against the top of the shoe. If you have calluses, wearing a cushioned insole, arch support, or heel counter can help reduce friction.  Visit your doctor  In some cases, your doctor may trim away the outer layers of skin that make up the corn or callus. For a painful corn, medicine may be injected beneath the built-up tissue.  Wear orthotics  Orthotics are specially made to meet the needs of your feet. They cushion calluses or divert pressure away from these problem areas. Worn as directed, orthotics help limit existing problems and prevent new ones from forming.  If you need surgery  If a bone or joint is out of place, certain parts of your foot may be under too much pressure. This can cause severe corns and calluses. In such cases, surgery may be the best way to correct the problem.  Outpatient procedures  In most cases, surgery to improve bone position is an outpatient procedure. Your doctor may cut away excess bone, reposition prominent bones, or even fuse joints. Sometimes tendons or ligaments are cut to reduce tension on a bone or joint. Your doctor  will talk with you about the procedure that is best suited to your needs.  Date Last Reviewed: 7/1/2016 2000-2017 [employer]. [address] [place] [address], [place] [address]. All rights reserved. This information is not intended as a substitute for professional medical care. Always follow your healthcare professional's instructions.                Follow-ups after your visit        Additional Services     PODIATRY/FOOT & ANKLE SURGERY REFERRAL       Your provider has referred you to: PREFERRED PROVIDERS: Dr Lindo     Please be aware that coverage of these services is subject to the terms and limitations of your health insurance plan.  Call member services at your health plan with any benefit or coverage questions.      Please bring the following to your appointment:  >>   Any x-rays, CTs or MRIs which have been performed.  Contact the facility where they were done to arrange for  prior to your scheduled appointment.    >>   List of current medications   >>   This referral request   >>   Any documents/labs given to you for this referral                  Who to contact     If you have questions or need follow up information about today's clinic visit or your schedule please contact [facility] directly at [phone].  Normal or non-critical lab and imaging results will be communicated to you by MyChart, letter or phone within 4 business days after the clinic has received the results. If you do not hear from us within 7 days, please contact the clinic through MyChart or phone. If you have a critical or abnormal lab result, we will notify you by phone as soon as possible.  Submit refill requests through [employer] or call your pharmacy and they will forward the refill request to us. Please allow 3 business days for your refill to be completed.          Additional Information About Your Visit        Care EveryWhere ID     This is your Care EveryWhere ID. This could be used by other  "organizations to access your [place] medical records  [id number]        Your Vitals Were     Pulse Temperature Respirations Height BMI (Body Mass Index)       58 98.4  F (36.9  C) (Tympanic) 18 5' 11.5\" (1.816 m) 37.55 kg/m2        Blood Pressure from Last 3 Encounters:   07/02/18 135/75   05/25/18 128/76   02/08/18 118/70    Weight from Last 3 Encounters:   07/02/18 273 lb (123.8 kg)   05/25/18 278 lb 4 oz (126.2 kg)   02/08/18 281 lb (127.5 kg)              We Performed the Following     PODIATRY/FOOT & ANKLE SURGERY REFERRAL        Primary Care Provider Office Phone # Fax #    Sebastien Faria [phone] [address] [place] [id number]        Equal Access to Services     [facility]: Hadii aad ku hadasho Soomaali, waaxda luqadaha, qaybta kaalmada adeegyada, waxay idiin hayaan ademarilee urbano . So [facility] [phone].    ATENCIÓN: Si habla español, tiene a nj disposición servicios gratuitos de asistencia lingüística. Allyson al [phone].    We comply with applicable federal civil rights laws and [place] laws. We do not discriminate on the basis of race, color, national origin, age, disability, sex, sexual orientation, or gender identity.            Thank you!     Thank you for choosing [facility]  for your care. Our goal is always to provide you with excellent care. Hearing back from our patients is one way we can continue to improve our services. Please take a few minutes to complete the written survey that you may receive in the mail after your visit with us. Thank you!             Your Updated Medication List - Protect others around you: Learn how to safely use, store and throw away your medicines at www.disposemymeds.org.          This list is accurate as of 7/2/18  4:50 PM.  Always use your most recent med list.                   Brand Name Dispense Instructions for use Diagnosis    atenolol 25 MG tablet    TENORMIN    90 tablet    Take 1 tablet " (25 mg) by mouth daily    Essential hypertension       citalopram 40 MG tablet    celeXA    30 tablet    Take 1 tablet (40 mg) by mouth daily    Schizophrenia, unspecified type (H), Mental retardation       * clonazePAM 0.5 MG tablet    klonoPIN    60 tablet    TAKE ONE TABLET BY MOUTH TWICE DAILY (AT 8 AM AND 8 PM)    Schizophrenia, unspecified type (H)       * clonazePAM 1 MG tablet    klonoPIN    20 tablet    Take 1 tablet (1 mg) by mouth daily as needed (for agitation)    Schizophrenia, unspecified type (H)       divalproex sodium extended-release 500 MG 24 hr tablet    DEPAKOTE ER    90 tablet    Take 3 tablets (1,500 mg) by mouth daily    Schizophrenia, unspecified type (H), Mental retardation       haloperidol 10 MG tablet    HALDOL    105 tablet    TAKE 1 tablet Twice a day at 8am and 12 noon and 1 1/2 tablets at bedtime Orally 30 days    Schizophrenia, unspecified type (H)       levothyroxine 50 MCG tablet    SYNTHROID/LEVOTHROID    90 tablet    Take 1 tablet (50 mcg) by mouth daily    Hypothyroidism due to medication       lithium 300 MG capsule     120 capsule    Take 2 capsules (600 mg) by mouth 2 times daily (with meals)    Schizophrenia, unspecified type (H), Mental retardation       NEW MED      X-vitate 40%, apply thinly, severe dry feet        * QUEtiapine 400 MG tablet    SEROquel    60 tablet    Take 1 tablet (400 mg) by mouth 2 times daily    Schizophrenia, unspecified type (H), Mental retardation       * QUEtiapine 300 MG tablet    SEROquel    30 tablet    1 tablet Once a day at noon Orally 30 days    Schizophrenia, unspecified type (H)       SM VITAMIN B-6 100 MG Tabs   Generic drug:  pyridOXINE     90 tablet    Take 1 tablet (100 mg) by mouth daily    Schizophrenia, unspecified type (H)       trihexyphenidyl 5 MG tablet    ARTANE    60 tablet    Take 1 tablet (5 mg) by mouth 2 times daily    Schizophrenia, unspecified type (H), Mental retardation       * Notice:  This list has 4 medication(s)  that are the same as other medications prescribed for you. Read the directions carefully, and ask your doctor or other care provider to review them with you.

## 2018-07-02 NOTE — PATIENT INSTRUCTIONS
Treating Corns and Calluses  If your corns or calluses are mild, reducing friction may help. Different shoes, moleskin patches, or soft pads may be all the treatment you need. In more severe cases, treating tissue buildup may require your doctor s care. Sometimes custom-made shoe inserts (orthotics) or special pads are prescribed to reduce friction and pressure.    Change shoes  If you have corns, your doctor may suggest wearing shoes that have more toe room. This way, buckled joints are less likely to be pinched against the top of the shoe. If you have calluses, wearing a cushioned insole, arch support, or heel counter can help reduce friction.  Visit your doctor  In some cases, your doctor may trim away the outer layers of skin that make up the corn or callus. For a painful corn, medicine may be injected beneath the built-up tissue.  Wear orthotics  Orthotics are specially made to meet the needs of your feet. They cushion calluses or divert pressure away from these problem areas. Worn as directed, orthotics help limit existing problems and prevent new ones from forming.  If you need surgery  If a bone or joint is out of place, certain parts of your foot may be under too much pressure. This can cause severe corns and calluses. In such cases, surgery may be the best way to correct the problem.  Outpatient procedures  In most cases, surgery to improve bone position is an outpatient procedure. Your doctor may cut away excess bone, reposition prominent bones, or even fuse joints. Sometimes tendons or ligaments are cut to reduce tension on a bone or joint. Your doctor will talk with you about the procedure that is best suited to your needs.  Date Last Reviewed: 7/1/2016 2000-2017 [employer]. [address] [place] [address]. All rights reserved. This information is not intended as a substitute for professional medical care. Always follow your healthcare professional's instructions.

## 2018-07-16 DIAGNOSIS — F20.9 SCHIZOPHRENIA, UNSPECIFIED TYPE (H): ICD-10-CM

## 2018-07-16 DIAGNOSIS — F79 MENTAL RETARDATION: ICD-10-CM

## 2018-07-16 NOTE — TELEPHONE ENCOUNTER
Requested Prescriptions   Pending Prescriptions Disp Refills     lithium 300 MG capsule 120 capsule 0     Sig: Take 2 capsules (600 mg) by mouth 2 times daily (with meals)    There is no refill protocol information for this order        Last Written Prescription Date:  6/27/18  Last Fill Quantity: 120,  # refills: 0   Last office visit: 7/2/2018 with prescribing provider:  Mckenna   Future Office Visit:

## 2018-07-17 DIAGNOSIS — F20.9 SCHIZOPHRENIA, UNSPECIFIED TYPE (H): ICD-10-CM

## 2018-07-17 RX ORDER — CLONAZEPAM 0.5 MG/1
TABLET ORAL
Qty: 60 TABLET | Refills: 3 | OUTPATIENT
Start: 2018-07-17

## 2018-07-17 RX ORDER — LITHIUM CARBONATE 300 MG/1
CAPSULE ORAL
Qty: 120 CAPSULE | Refills: 0 | Status: SHIPPED | OUTPATIENT
Start: 2018-07-17 | End: 2018-08-16

## 2018-07-17 NOTE — TELEPHONE ENCOUNTER
clonazePAM (KLONOPIN) 0.5 MG tablet   Last Written Prescription Date:  1/11/18  Last Fill Quantity: 60,   # refills: 3  Last Office Visit: 7/2/18  Future Office visit:       Routing refill request to provider for review/approval because:  Drug not on the [facility], [facility] or [facility] refill protocol or controlled substance

## 2018-08-09 DIAGNOSIS — F20.9 SCHIZOPHRENIA, UNSPECIFIED TYPE (H): ICD-10-CM

## 2018-08-09 RX ORDER — VITAMIN E 268 MG
CAPSULE ORAL
Qty: 30 TABLET | Refills: 6 | Status: SHIPPED | OUTPATIENT
Start: 2018-08-09 | End: 2019-05-02

## 2018-08-09 NOTE — TELEPHONE ENCOUNTER
Requested Prescriptions   Pending Prescriptions Disp Refills     SM VITAMIN B-6 100 MG TABS [Pharmacy Med Name: VITAMIN B-6 100 MG TABLET]       Sig: TAKE ONE TABLET BY MOUTH EVERY DAY    There is no refill protocol information for this order      \  Last Written Prescription Date:  7/16/18  Last Fill Quantity: 90,  # refills: 1   Last office visit: 7/2/2018 with prescribing provider:     Future Office Visit:

## 2018-08-13 DIAGNOSIS — F20.9 SCHIZOPHRENIA, UNSPECIFIED TYPE (H): ICD-10-CM

## 2018-08-13 NOTE — TELEPHONE ENCOUNTER
Requested Prescriptions   Pending Prescriptions Disp Refills     clonazePAM (KLONOPIN) 0.5 MG tablet  Last Written Prescription Date:  01/11/18  Last Fill Quantity: 60,  # refills: 3   Last office visit: 7/2/2018 with prescribing provider:  07/02/18   Future Office Visit:     60 tablet 3     Sig: TAKE ONE TABLET BY MOUTH TWICE DAILY (AT 8 AM AND 8 PM)    There is no refill protocol information for this order

## 2018-08-14 RX ORDER — CLONAZEPAM 0.5 MG/1
TABLET ORAL
Qty: 60 TABLET | Refills: 0 | Status: SHIPPED | OUTPATIENT
Start: 2018-08-14 | End: 2018-08-17

## 2018-08-14 NOTE — TELEPHONE ENCOUNTER
Will send to PCP due to Nirali Worthington is no longer with [facility].  PCP originally ordered medication.

## 2018-08-15 NOTE — TELEPHONE ENCOUNTER
Refill x 1 until Dr Faria back.  RX in [place].  Due for yearly DIONI physical in September.  Please help them schedule this appointment with his Primary Care Provider to review ongoing Psychiatry Plan of Care.  Thanks Arlen Garcia [facility]

## 2018-08-16 DIAGNOSIS — F79 MENTAL RETARDATION: ICD-10-CM

## 2018-08-16 DIAGNOSIS — F20.9 SCHIZOPHRENIA, UNSPECIFIED TYPE (H): ICD-10-CM

## 2018-08-16 RX ORDER — LITHIUM CARBONATE 300 MG/1
CAPSULE ORAL
Qty: 60 CAPSULE | Refills: 0 | Status: SHIPPED | OUTPATIENT
Start: 2018-08-16 | End: 2018-09-06

## 2018-08-16 NOTE — TELEPHONE ENCOUNTER
lithium 300 MG capsule      Last Written Prescription Date:  7/17/18  Last Fill Quantity: 120,   # refills: 0  Last Office Visit: 7/2/18  Future Office visit:       Routing refill request to provider for review/approval because:  Drug not on the [facility], [facility] or [facility] refill protocol or controlled substance

## 2018-08-17 DIAGNOSIS — F79 MENTAL RETARDATION: ICD-10-CM

## 2018-08-17 DIAGNOSIS — F20.9 SCHIZOPHRENIA, UNSPECIFIED TYPE (H): ICD-10-CM

## 2018-08-17 NOTE — TELEPHONE ENCOUNTER
"Requested Prescriptions   Pending Prescriptions Disp Refills     divalproex sodium extended-release (DEPAKOTE ER) 500 MG 24 hr tablet  Last Written Prescription Date:  18  Last Fill Quantity: 90,  # refills: 3  Last office visit: 2018 with prescribing provider:  18   Future Office Visit:     90 tablet 3     Sig: Take 3 tablets (1,500 mg) by mouth daily    Anti-Seizure Meds Protocol  Failed    2018  8:00 AM       Failed - Review Authorizing provider's last note.     Refer to last progress notes: confirm request is for original authorizing provider (cannot be through other providers)       Passed - Recent (12 mo) or future (30 days) visit within the authorizing provider's specialty    Patient had office visit in the last 12 months or has a visit in the next 30 days with authorizing provider or within the authorizing provider's specialty.  See \"Patient Info\" tab in inbasket, or \"Choose Columns\" in Meds & Orders section of the refill encounter.           Passed - Normal CBC on file in past 26 months    Recent Labs   Lab Test  17   1433   WBC  6.3   RBC  4.25*   HGB  12.5*   HCT  38.9*   PLT  198       For [facility] ONLY: [id number] = WBC, [id number] = RBC         Passed - Normal ALT or AST on file in past 26 months    Recent Labs   Lab Test  17   0821   ALT  27     Recent Labs   Lab Test  17   0821   AST  19          Passed - Normal platelet count on file in past 26 months    Recent Labs   Lab Test  17   1433   PLT  198          Passed - Depakote level within therapeutic range in past 26 months    No results found for: VALPROIC  Depakote level must be checked 2-4 weeks after dosage change.          QUEtiapine (SEROQUEL) 300 MG tablet  Last Written Prescription Date:  18  Last Fill Quantity: 30,  # refills: 3   Last office visit: 2018 with prescribing provider:  18   Future Office Visit:   30 tablet 3     Si tablet Once a day at noon Orally 30 days    Antipsychotic " "Medications Passed    8/17/2018  8:00 AM       Passed - Blood pressure under 140/90 in past 12 months    BP Readings from Last 3 Encounters:   07/02/18 135/75   05/25/18 128/76   02/08/18 118/70          Passed - Patient is 12 years of age or older       Passed - Lipid panel on file within the past 12 months    Recent Labs   Lab Test  09/22/17   0821   02/25/15   0855   CHOL  175   < >  187   TRIG  218*   < >  122   HDL  45   < >  47   LDL  86   < >  116   NHDL  130*   < >   --    VLDL   --    --   24   CHOLHDLRATIO   --    --   4.0    < > = values in this interval not displayed.          Passed - CBC on file in past 12 months    Recent Labs   Lab Test  08/23/17   1433   WBC  6.3   RBC  4.25*   HGB  12.5*   HCT  38.9*   PLT  198       For [facility] ONLY: [id number] = WBC, [id number] = RBC         Passed - Heart Rate on file within past 12 months    Pulse Readings from Last 3 Encounters:   07/02/18 58   05/25/18 101   02/08/18 66          Passed - A1c or Glucose on file in past 12 months    Recent Labs   Lab Test  08/23/17   1433   GLC  89   A1C  4.7       Please review patients last 3 weights. If a weight gain of >10 lbs exists, you may refill the prescription once after instructing the patient to schedule an appointment within the next 30 days.    Wt Readings from Last 3 Encounters:   07/02/18 273 lb (123.8 kg)   05/25/18 278 lb 4 oz (126.2 kg)   02/08/18 281 lb (127.5 kg)          Passed - Recent (6 mo) or future (30 days) visit within the authorizing provider's specialty    Patient had office visit in the last 6 months or has a visit in the next 30 days with authorizing provider or within the authorizing provider's specialty.  See \"Patient Info\" tab in inbasket, or \"Choose Columns\" in Meds & Orders section of the refill encounter.            QUEtiapine (SEROQUEL) 400 MG tablet  Last Written Prescription Date:  05/25/18  Last Fill Quantity: 60,  # refills: 3   Last office visit: 7/2/2018 with prescribing provider:  07/02/18 " "  Future Office Visit:     60 tablet 3       Passed - Heart Rate on file within past 12 months    Pulse Readings from Last 3 Encounters:   07/02/18 58   05/25/18 101   02/08/18 66          Passed - A1c or Glucose on file in past 12 months    Recent Labs   Lab Test  08/23/17   1433   GLC  89   A1C  4.7       Please review patients last 3 weights. If a weight gain of >10 lbs exists, you may refill the prescription once after instructing the patient to schedule an appointment within the next 30 days.    Wt Readings from Last 3 Encounters:   07/02/18 273 lb (123.8 kg)   05/25/18 278 lb 4 oz (126.2 kg)   02/08/18 281 lb (127.5 kg)            Passed - Recent (6 mo) or future (30 days) visit within the authorizing provider's specialty    Patient had office visit in the last 6 months or has a visit in the next 30 days with authorizing provider or within the authorizing provider's specialty.  See \"Patient Info\" tab in inbasket, or \"Choose Columns\" in Meds & Orders section of the refill encounter.            haloperidol (HALDOL) 10 MG tablet  Last Written Prescription Date:  05/25/18  Last Fill Quantity: 105,  # refills: 2   Last office visit: 7/2/2018 with prescribing provider:  07/02/18   Future Office Visit:     105 tablet 2     Sig: TAKE 1 tablet Twice a day at 8am and 12 noon and 1 1/2 tablets at bedtime Orally 30 days    There is no refill protocol information for this order        clonazePAM (KLONOPIN) 0.5 MG tablet  Last Written Prescription Date:  08/14/18  Last Fill Quantity: 60,  # refills: 0   Last office visit: 7/2/2018 with prescribing provider:  07/02/18   Future Office Visit:     60 tablet 0     Sig: TAKE ONE TABLET BY MOUTH TWICE DAILY (AT 8 AM AND 8 PM)    There is no refill protocol information for this order        citalopram (CELEXA) 40 MG tablet  Last Written Prescription Date:  04/18/18  Last Fill Quantity: 30,  # refills: 3   Last office visit: 7/2/2018 with prescribing provider:  07/02/18   Future " "Office Visit:     30 tablet 3     Sig: Take 1 tablet (40 mg) by mouth daily    SSRIs Protocol Passed    8/17/2018  8:00 AM       Passed - Recent (12 mo) or future (30 days) visit within the authorizing provider's specialty    Patient had office visit in the last 12 months or has a visit in the next 30 days with authorizing provider or within the authorizing provider's specialty.  See \"Patient Info\" tab in inbasket, or \"Choose Columns\" in Meds & Orders section of the refill encounter.           Passed - Patient is age 18 or older          "

## 2018-08-17 NOTE — TELEPHONE ENCOUNTER
Patient is followed by Dr. Sebastien Faria.  Will route to his care team for continuity of care.    Chuyita SOMMER RN

## 2018-08-21 RX ORDER — HALOPERIDOL 10 MG/1
TABLET ORAL
Qty: 105 TABLET | Refills: 0 | Status: SHIPPED | OUTPATIENT
Start: 2018-08-21 | End: 2018-09-12

## 2018-08-21 RX ORDER — CLONAZEPAM 0.5 MG/1
TABLET ORAL
Qty: 60 TABLET | Refills: 0 | Status: SHIPPED | OUTPATIENT
Start: 2018-08-21 | End: 2018-10-08

## 2018-08-21 RX ORDER — CITALOPRAM HYDROBROMIDE 40 MG/1
TABLET ORAL
Qty: 30 TABLET | Refills: 0 | Status: SHIPPED | OUTPATIENT
Start: 2018-08-21 | End: 2018-09-12

## 2018-08-21 RX ORDER — QUETIAPINE FUMARATE 300 MG/1
TABLET, FILM COATED ORAL
Qty: 30 TABLET | Refills: 0 | Status: SHIPPED | OUTPATIENT
Start: 2018-08-21 | End: 2018-10-08

## 2018-08-21 RX ORDER — QUETIAPINE FUMARATE 400 MG/1
TABLET, FILM COATED ORAL
Qty: 60 TABLET | Refills: 0 | Status: SHIPPED | OUTPATIENT
Start: 2018-08-21 | End: 2018-10-08

## 2018-08-21 RX ORDER — DIVALPROEX SODIUM 500 MG/1
TABLET, EXTENDED RELEASE ORAL
Qty: 90 TABLET | Refills: 0 | Status: SHIPPED | OUTPATIENT
Start: 2018-08-21 | End: 2018-10-08

## 2018-09-06 DIAGNOSIS — F20.9 SCHIZOPHRENIA, UNSPECIFIED TYPE (H): ICD-10-CM

## 2018-09-06 DIAGNOSIS — F79 MENTAL RETARDATION: ICD-10-CM

## 2018-09-06 RX ORDER — LITHIUM CARBONATE 300 MG/1
CAPSULE ORAL
Qty: 120 CAPSULE | Refills: 1 | Status: SHIPPED | OUTPATIENT
Start: 2018-09-06 | End: 2018-10-08

## 2018-09-06 NOTE — TELEPHONE ENCOUNTER
Patient would like quantity change from 60 to 120 quantity for lithium 300 MG capsule      lithium 300 MG capsule      Last Written Prescription Date:  8/16/18  Last Fill Quantity: 60,   # refills: 0  Last Office Visit: 7/2/18  Future Office visit:       Routing refill request to provider for review/approval because:  Drug not on the [facility], [facility] or [facility] refill protocol or controlled substance

## 2018-09-12 DIAGNOSIS — F79 MENTAL RETARDATION: ICD-10-CM

## 2018-09-12 DIAGNOSIS — F20.9 SCHIZOPHRENIA, UNSPECIFIED TYPE (H): ICD-10-CM

## 2018-09-12 RX ORDER — CITALOPRAM HYDROBROMIDE 40 MG/1
TABLET ORAL
Qty: 30 TABLET | Refills: 0 | Status: SHIPPED | OUTPATIENT
Start: 2018-09-12 | End: 2018-10-08

## 2018-09-12 RX ORDER — TRIHEXYPHENIDYL HYDROCHLORIDE 5 MG/1
TABLET ORAL
Qty: 60 TABLET | Refills: 3 | Status: SHIPPED | OUTPATIENT
Start: 2018-09-12 | End: 2019-01-08

## 2018-09-12 RX ORDER — HALOPERIDOL 10 MG/1
TABLET ORAL
Qty: 105 TABLET | Refills: 0 | Status: SHIPPED | OUTPATIENT
Start: 2018-09-12 | End: 2018-10-08

## 2018-09-12 NOTE — TELEPHONE ENCOUNTER
Requested Prescriptions   Pending Prescriptions Disp Refills     trihexyphenidyl (ARTANE) 5 MG tablet 60 tablet 3     Sig: Take 1 tablet (5 mg) by mouth 2 times daily    There is no refill protocol information for this order        Last Written Prescription Date:  6/27/18  Last Fill Quantity: 60,  # refills: 3   Last office visit: 7/2/2018 with prescribing provider:  Mckenna Richter Office Visit:

## 2018-10-08 DIAGNOSIS — F20.9 SCHIZOPHRENIA, UNSPECIFIED TYPE (H): ICD-10-CM

## 2018-10-08 DIAGNOSIS — F79 MENTAL RETARDATION: ICD-10-CM

## 2018-10-08 RX ORDER — CLONAZEPAM 0.5 MG/1
TABLET ORAL
Qty: 60 TABLET | Refills: 2 | Status: SHIPPED | OUTPATIENT
Start: 2018-10-08 | End: 2018-11-05

## 2018-10-08 NOTE — TELEPHONE ENCOUNTER
Controlled Substance Refill Request for clonazePAM (KLONOPIN) 0.5 MG tablet  Problem List Complete:  Yes   checked in past 3 months?  No, route to RN     PHQ-9 SCORE 9/19/2017 2/8/2018 5/25/2018   Total Score 4 0 0     JENNIFER-7 SCORE 9/19/2017 2/8/2018 5/25/2018   Total Score 11 15 5       Last Written Prescription Date:  8/21/18  Last Fill Quantity: 60,  # refills: 0   Last office visit: 7/2/2018 with prescribing provider:     Future Office Visit:        Associated Diagnoses   Schizophrenia, unspecified type (H) [F20.9]

## 2018-10-08 NOTE — TELEPHONE ENCOUNTER
Pt needs this med delivered by the 10th. Please address.  They have been faxing this to the [facility] # in error.     clonazePAM (KLONOPIN) 0.5 MG tablet  Last Written Prescription Date:  8/21/18  Last Fill Quantity: 60,   # refills: 0  Last Office Visit: 7/2/18  Future Office visit:

## 2018-10-09 RX ORDER — LITHIUM CARBONATE 300 MG/1
CAPSULE ORAL
Qty: 120 CAPSULE | Refills: 0 | Status: SHIPPED | OUTPATIENT
Start: 2018-10-09 | End: 2018-11-05

## 2018-10-09 RX ORDER — QUETIAPINE FUMARATE 400 MG/1
TABLET, FILM COATED ORAL
Qty: 60 TABLET | Refills: 0 | Status: SHIPPED | OUTPATIENT
Start: 2018-10-09 | End: 2018-11-05

## 2018-10-09 RX ORDER — DIVALPROEX SODIUM 500 MG/1
TABLET, EXTENDED RELEASE ORAL
Qty: 90 TABLET | Refills: 0 | Status: SHIPPED | OUTPATIENT
Start: 2018-10-09 | End: 2018-11-05

## 2018-10-09 RX ORDER — HALOPERIDOL 10 MG/1
TABLET ORAL
Qty: 105 TABLET | Refills: 0 | Status: SHIPPED | OUTPATIENT
Start: 2018-10-09 | End: 2018-11-05

## 2018-10-09 RX ORDER — CITALOPRAM HYDROBROMIDE 40 MG/1
TABLET ORAL
Qty: 30 TABLET | Refills: 0 | Status: SHIPPED | OUTPATIENT
Start: 2018-10-09 | End: 2018-11-05

## 2018-10-09 RX ORDER — QUETIAPINE FUMARATE 300 MG/1
TABLET, FILM COATED ORAL
Qty: 30 TABLET | Refills: 0 | Status: SHIPPED | OUTPATIENT
Start: 2018-10-09 | End: 2018-11-05

## 2018-10-09 NOTE — TELEPHONE ENCOUNTER
"Requested Prescriptions   Pending Prescriptions Disp Refills     divalproex sodium extended-release (DEPAKOTE ER) 500 MG 24 hr tablet [Pharmacy Med Name: DIVALPROEX SODIUM  MG TAB ER 24H] 90 tablet     Last Written Prescription Date:  09/11/18  Last Fill Quantity: 90,  # refills: 0   Last office visit: 7/2/2018 with prescribing provider:  SERA Andres   Future Office Visit:     Sig: TAKE THREE TABLETS BY MOUTH EVERY DAY    Anti-Seizure Meds Protocol  Failed    10/8/2018  4:25 PM       Failed - Review Authorizing provider's last note.     Refer to last progress notes: confirm request is for original authorizing provider (cannot be through other providers).         Passed - Recent (12 mo) or future (30 days) visit within the authorizing provider's specialty    Patient had office visit in the last 12 months or has a visit in the next 30 days with authorizing provider or within the authorizing provider's specialty.  See \"Patient Info\" tab in inbasket, or \"Choose Columns\" in Meds & Orders section of the refill encounter.           Passed - Normal CBC on file in past 26 months    Recent Labs   Lab Test  08/23/17   1433   WBC  6.3   RBC  4.25*   HGB  12.5*   HCT  38.9*   PLT  198       For [facility] ONLY: [id number] = WBC, [id number] = RBC         Passed - Normal ALT or AST on file in past 26 months    Recent Labs   Lab Test  09/22/17   0821   ALT  27     Recent Labs   Lab Test  09/22/17   0821   AST  19            Passed - Normal platelet count on file in past 26 months    Recent Labs   Lab Test  08/23/17   1433   PLT  198              Passed - Depakote level within therapeutic range in past 26 months    No results found for: VALPROIC  Depakote level must be checked 2-4 weeks after dosage change.          lithium 300 MG capsule [Pharmacy Med Name: LITHIUM CARBONATE 300 MG CAPSULE] 120 capsule     Last Written Prescription Date:  09/28/18  Last Fill Quantity: 120,  # refills: 0   Last office visit: 7/2/2018 with prescribing " "provider:  Jan ZAMORA Future Office Visit:     Sig: Take 2 capsules (600 mg) by mouth 2 times daily (with meals)    There is no refill protocol information for this order        haloperidol (HALDOL) 10 MG tablet [Pharmacy Med Name: HALOPERIDOL 10 MG TABLET] 105 tablet     Last Written Prescription Date:  09/13/18  Last Fill Quantity: 105,  # refills: 0   Last office visit: 7/2/2018 with prescribing provider:  SERA Andres Future Office Visit:     Sig: TAKE ONE TABLET BY MOUTH TWICE DAILY AT 8 AM AND 12 PM AND TAKE 1 AND 1/2 TABLETS BY MOUTH AT BEDTIME    There is no refill protocol information for this order        citalopram (CELEXA) 40 MG tablet [Pharmacy Med Name: CITALOPRAM HBR 40 MG TABLET] 30 tablet     Last Written Prescription Date:  09/13/18  Last Fill Quantity: 30,  # refills: 0   Last office visit: 7/2/2018 with prescribing provider:  SERA Andres Future Office Visit:     Sig: TAKE ONE TABLET BY MOUTH EVERY DAY    SSRIs Protocol Passed    10/8/2018  4:25 PM   JENNIFER-7 SCORE 9/19/2017 2/8/2018 5/25/2018   Total Score 11 15 5     PHQ-9 SCORE 9/19/2017 2/8/2018 5/25/2018   Total Score 4 0 0       Passed - Recent (12 mo) or future (30 days) visit within the authorizing provider's specialty    Patient had office visit in the last 12 months or has a visit in the next 30 days with authorizing provider or within the authorizing provider's specialty.  See \"Patient Info\" tab in inbasket, or \"Choose Columns\" in Meds & Orders section of the refill encounter.           Passed - Patient is age 18 or older        QUEtiapine (SEROQUEL) 300 MG tablet [Pharmacy Med Name: QUETIAPINE FUMARATE 300 MG TABLET] 30 tablet     Last Written Prescription Date:  09/11/18  Last Fill Quantity: 30,  # refills: 0   Last office visit: 7/2/2018 with prescribing provider:  SERA Andres Future Office Visit:     Sig: TAKE ONE TABLET BY MOUTH EVERY DAY AT 12 PM    Antipsychotic Medications Failed    10/8/2018  4:25 PM       Failed - Lipid panel on file " "within the past 12 months    Recent Labs   Lab Test  09/22/17   0821   02/25/15   0855   CHOL  175   < >  187   TRIG  218*   < >  122   HDL  45   < >  47   LDL  86   < >  116   NHDL  130*   < >   --    VLDL   --    --   24   CHOLHDLRATIO   --    --   4.0    < > = values in this interval not displayed.              Failed - CBC on file in past 12 months    Recent Labs   Lab Test  08/23/17   1433   WBC  6.3   RBC  4.25*   HGB  12.5*   HCT  38.9*   PLT  198       For [facility] ONLY: [id number] = WBC, [id number] = RBC         Failed - A1c or Glucose on file in past 12 months    Recent Labs   Lab Test  08/23/17   1433   GLC  89   A1C  4.7       Please review patients last 3 weights. If a weight gain of >10 lbs exists, you may refill the prescription once after instructing the patient to schedule an appointment within the next 30 days.    Wt Readings from Last 3 Encounters:   07/02/18 273 lb (123.8 kg)   05/25/18 278 lb 4 oz (126.2 kg)   02/08/18 281 lb (127.5 kg)            Passed - Blood pressure under 140/90 in past 12 months    BP Readings from Last 3 Encounters:   07/02/18 135/75   05/25/18 128/76   02/08/18 118/70                Passed - Patient is 12 years of age or older       Passed - Heart Rate on file within past 12 months    Pulse Readings from Last 3 Encounters:   07/02/18 58   05/25/18 101   02/08/18 66              Passed - Recent (6 mo) or future (30 days) visit within the authorizing provider's specialty    Patient had office visit in the last 6 months or has a visit in the next 30 days with authorizing provider or within the authorizing provider's specialty.  See \"Patient Info\" tab in inbasket, or \"Choose Columns\" in Meds & Orders section of the refill encounter.            QUEtiapine (SEROQUEL) 400 MG tablet [Pharmacy Med Name: QUETIAPINE FUMARATE 400 MG TABLET] 60 tablet     Last Written Prescription Date:  09/11/18  Last Fill Quantity: 30,  # refills: 0   Last office visit: 7/2/2018 with prescribing provider:  " "SERA Andres Future Office Visit:     Sig: TAKE ONE TABLET BY MOUTH TWICE DAILY    Antipsychotic Medications Failed    10/8/2018  4:25 PM       Failed - Lipid panel on file within the past 12 months    Recent Labs   Lab Test  09/22/17   0821   02/25/15   0855   CHOL  175   < >  187   TRIG  218*   < >  122   HDL  45   < >  47   LDL  86   < >  116   NHDL  130*   < >   --    VLDL   --    --   24   CHOLHDLRATIO   --    --   4.0    < > = values in this interval not displayed.              Failed - CBC on file in past 12 months    Recent Labs   Lab Test  08/23/17   1433   WBC  6.3   RBC  4.25*   HGB  12.5*   HCT  38.9*   PLT  198       For [facility] ONLY: [id number] = WBC, [id number] = RBC         Failed - A1c or Glucose on file in past 12 months    Recent Labs   Lab Test  08/23/17   1433   GLC  89   A1C  4.7       Please review patients last 3 weights. If a weight gain of >10 lbs exists, you may refill the prescription once after instructing the patient to schedule an appointment within the next 30 days.    Wt Readings from Last 3 Encounters:   07/02/18 273 lb (123.8 kg)   05/25/18 278 lb 4 oz (126.2 kg)   02/08/18 281 lb (127.5 kg)            Passed - Blood pressure under 140/90 in past 12 months    BP Readings from Last 3 Encounters:   07/02/18 135/75   05/25/18 128/76   02/08/18 118/70                Passed - Patient is 12 years of age or older       Passed - Heart Rate on file within past 12 months    Pulse Readings from Last 3 Encounters:   07/02/18 58   05/25/18 101   02/08/18 66              Passed - Recent (6 mo) or future (30 days) visit within the authorizing provider's specialty    Patient had office visit in the last 6 months or has a visit in the next 30 days with authorizing provider or within the authorizing provider's specialty.  See \"Patient Info\" tab in inbasket, or \"Choose Columns\" in Meds & Orders section of the refill encounter.              "

## 2018-10-15 ENCOUNTER — TELEPHONE (OUTPATIENT)
Dept: FAMILY MEDICINE | Facility: CLINIC | Age: 38
End: 2018-10-15

## 2018-10-15 NOTE — TELEPHONE ENCOUNTER
Jacki, pharmacist from [facility] says pt is on Citalopram and haloperidol (HALDOL) 10 MG tablet. There is a drug interaction:  There is a QT prolonging agents. He has been on this for quite some time but wasn't sure if this has been addressed. They just wanted to make sure this is OK and that he is being monitored.   Pharmacy phone: [phone]

## 2018-11-05 DIAGNOSIS — F20.9 SCHIZOPHRENIA, UNSPECIFIED TYPE (H): ICD-10-CM

## 2018-11-05 DIAGNOSIS — F79 MENTAL RETARDATION: ICD-10-CM

## 2018-11-06 NOTE — TELEPHONE ENCOUNTER
Requested Prescriptions   Pending Prescriptions Disp Refills     lithium 300 MG capsule [Pharmacy Med Name: LITHIUM CARBONATE 300 MG CAPSULE] 120 capsule      Sig: Take 2 capsules (600 mg) by mouth 2 times daily (with meals)    There is no refill protocol information for this order     Last Written Prescription Date:  10/9/18  Last Fill Quantity: 120,   # refills: 0  Last Office Visit: 7/2/18  Future Office visit:       Routing refill request to provider for review/approval because:  Drug not on the [facility], [facility] or [facility] refill protocol or controlled substance  '        QUEtiapine (SEROQUEL) 300 MG tablet [Pharmacy Med Name: QUETIAPINE FUMARATE 300 MG TABLET] 30 tablet      Sig: TAKE ONE TABLET BY MOUTH EVERY DAY AT 12 PM    Antipsychotic Medications Failed    11/5/2018  4:47 PM       Failed - Lipid panel on file within the past 12 months    Recent Labs   Lab Test  09/22/17   0821   02/25/15   0855   CHOL  175   < >  187   TRIG  218*   < >  122   HDL  45   < >  47   LDL  86   < >  116   NHDL  130*   < >   --    VLDL   --    --   24   CHOLHDLRATIO   --    --   4.0    < > = values in this interval not displayed.              Failed - CBC on file in past 12 months    Recent Labs   Lab Test  08/23/17   1433   WBC  6.3   RBC  4.25*   HGB  12.5*   HCT  38.9*   PLT  198                Failed - A1c or Glucose on file in past 12 months    Recent Labs   Lab Test  08/23/17   1433   GLC  89   A1C  4.7       Please review patients last 3 weights. If a weight gain of >10 lbs exists, you may refill the prescription once after instructing the patient to schedule an appointment within the next 30 days.    Wt Readings from Last 3 Encounters:   07/02/18 273 lb (123.8 kg)   05/25/18 278 lb 4 oz (126.2 kg)   02/08/18 281 lb (127.5 kg)            Passed - Blood pressure under 140/90 in past 12 months    BP Readings from Last 3 Encounters:   07/02/18 135/75   05/25/18 128/76   02/08/18 118/70                Passed - Patient is 12 years of  "age or older       Passed - Heart Rate on file within past 12 months    Pulse Readings from Last 3 Encounters:   07/02/18 58   05/25/18 101   02/08/18 66              Passed - Recent (6 mo) or future (30 days) visit within the authorizing provider's specialty    Patient had office visit in the last 6 months or has a visit in the next 30 days with authorizing provider or within the authorizing provider's specialty.  See \"Patient Info\" tab in inbasket, or \"Choose Columns\" in Meds & Orders section of the refill encounter.      Last Written Prescription Date:  10/9/18  Last Fill Quantity: 30,  # refills: 0   Last office visit: 7/2/2018 with prescribing provider:     Future Office Visit:              QUEtiapine (SEROQUEL) 400 MG tablet [Pharmacy Med Name: QUETIAPINE FUMARATE 400 MG TABLET] 60 tablet      Sig: TAKE ONE TABLET BY MOUTH TWICE DAILY    Antipsychotic Medications Failed    11/5/2018  4:47 PM       Failed - Lipid panel on file within the past 12 months    Recent Labs   Lab Test  09/22/17   0821   02/25/15   0855   CHOL  175   < >  187   TRIG  218*   < >  122   HDL  45   < >  47   LDL  86   < >  116   NHDL  130*   < >   --    VLDL   --    --   24   CHOLHDLRATIO   --    --   4.0    < > = values in this interval not displayed.              Failed - CBC on file in past 12 months    Recent Labs   Lab Test  08/23/17   1433   WBC  6.3   RBC  4.25*   HGB  12.5*   HCT  38.9*   PLT  198                Failed - A1c or Glucose on file in past 12 months    Recent Labs   Lab Test  08/23/17   1433   GLC  89   A1C  4.7       Please review patients last 3 weights. If a weight gain of >10 lbs exists, you may refill the prescription once after instructing the patient to schedule an appointment within the next 30 days.    Wt Readings from Last 3 Encounters:   07/02/18 273 lb (123.8 kg)   05/25/18 278 lb 4 oz (126.2 kg)   02/08/18 281 lb (127.5 kg)            Passed - Blood pressure under 140/90 in past 12 months    BP Readings " "from Last 3 Encounters:   07/02/18 135/75   05/25/18 128/76   02/08/18 118/70                Passed - Patient is 12 years of age or older       Passed - Heart Rate on file within past 12 months    Pulse Readings from Last 3 Encounters:   07/02/18 58   05/25/18 101   02/08/18 66              Passed - Recent (6 mo) or future (30 days) visit within the authorizing provider's specialty    Patient had office visit in the last 6 months or has a visit in the next 30 days with authorizing provider or within the authorizing provider's specialty.  See \"Patient Info\" tab in inbasket, or \"Choose Columns\" in Meds & Orders section of the refill encounter.      Last Written Prescription Date:  10/9/18  Last Fill Quantity: 60,  # refills: 0   Last office visit: 7/2/2018 with prescribing provider:     Future Office Visit:              citalopram (CELEXA) 40 MG tablet [Pharmacy Med Name: CITALOPRAM HBR 40 MG TABLET] 30 tablet      Sig: TAKE ONE TABLET BY MOUTH EVERY DAY    SSRIs Protocol Failed    11/5/2018  4:47 PM       Failed - Recent (12 mo) or future (30 days) visit within the authorizing provider's specialty    Patient had office visit in the last 12 months or has a visit in the next 30 days with authorizing provider or within the authorizing provider's specialty.  See \"Patient Info\" tab in [employer], or \"Choose Columns\" in Meds & Orders section of the refill encounter.      Last Written Prescription Date:  10/9/18  Last Fill Quantity: 30,  # refills: 0   Last office visit: 7/2/2018 with prescribing provider:     Future Office Visit:               Passed - Patient is age 18 or older        haloperidol (HALDOL) 10 MG tablet [Pharmacy Med Name: HALOPERIDOL 10 MG TABLET] 105 tablet      Sig: TAKE ONE TABLET BY MOUTH TWICE DAILY AT 8 AM AND 12 PM AND TAKE 1 AND 1/2 TABLETS BY MOUTH AT BEDTIME    There is no refill protocol information for this order          Last Written Prescription Date:  10/09/18  Last Fill Quantity: 105,   # " "refills: 0  Last Office Visit: 7/2/18  Future Office visit:       Routing refill request to provider for review/approval because:  Drug not on the [facility], [facility] or [facility] refill protocol or controlled substance          divalproex sodium extended-release (DEPAKOTE ER) 500 MG 24 hr tablet [Pharmacy Med Name: DIVALPROEX SODIUM  MG TAB ER 24H] 90 tablet      Sig: TAKE THREE TABLETS BY MOUTH EVERY DAY    Anti-Seizure Meds Protocol  Failed    11/5/2018  4:47 PM       Failed - Recent (12 mo) or future (30 days) visit within the authorizing provider's specialty    Patient had office visit in the last 12 months or has a visit in the next 30 days with authorizing provider or within the authorizing provider's specialty.  See \"Patient Info\" tab in inbasket, or \"Choose Columns\" in Meds & Orders section of the refill encounter.      Last Written Prescription Date:  10/9/18  Last Fill Quantity: 90,  # refills: 0   Last office visit: 7/2/2018 with prescribing provider:     Future Office Visit:               Failed - Review Authorizing provider's last note.     Refer to last progress notes: confirm request is for original authorizing provider (cannot be through other providers).         Passed - Normal CBC on file in past 26 months    Recent Labs   Lab Test  08/23/17   1433   WBC  6.3   RBC  4.25*   HGB  12.5*   HCT  38.9*   PLT  198                Passed - Normal ALT or AST on file in past 26 months    Recent Labs   Lab Test  09/22/17   0821   ALT  27     Recent Labs   Lab Test  09/22/17   0821   AST  19            Passed - Normal platelet count on file in past 26 months    Recent Labs   Lab Test  08/23/17   1433   PLT  198              Passed - Depakote level within therapeutic range in past 26 months    Lab Results   Component Value Date    ANGELLA 99 09/22/2017     Depakote level must be checked 2-4 weeks after dosage change.          clonazePAM (KLONOPIN) 0.5 MG tablet [Pharmacy Med Name: CLONAZEPAM 0.5 MG TABLET] 60 tablet     "  Sig: TAKE ONE TABLET BY MOUTH TWICE DAILY AT 8 AM AND 8 PM          Last Written Prescription Date:  10/8/18  Last Fill Quantity: 60,   # refills: 2  Last Office Visit: 7/2/18  Future Office visit:       Routing refill request to provider for review/approval because:  Drug not on the [facility], [facility] or [facility] refill protocol or controlled substance

## 2018-11-07 RX ORDER — DIVALPROEX SODIUM 500 MG/1
TABLET, EXTENDED RELEASE ORAL
Qty: 90 TABLET | Refills: 0 | Status: SHIPPED | OUTPATIENT
Start: 2018-11-07 | End: 2018-12-10

## 2018-11-07 RX ORDER — LITHIUM CARBONATE 300 MG/1
CAPSULE ORAL
Qty: 120 CAPSULE | Refills: 0 | Status: SHIPPED | OUTPATIENT
Start: 2018-11-07

## 2018-11-07 RX ORDER — QUETIAPINE FUMARATE 300 MG/1
TABLET, FILM COATED ORAL
Qty: 30 TABLET | Refills: 0 | Status: SHIPPED | OUTPATIENT
Start: 2018-11-07 | End: 2018-12-10

## 2018-11-07 RX ORDER — HALOPERIDOL 10 MG/1
TABLET ORAL
Qty: 105 TABLET | Refills: 0 | Status: SHIPPED | OUTPATIENT
Start: 2018-11-07 | End: 2018-12-10

## 2018-11-07 RX ORDER — QUETIAPINE FUMARATE 400 MG/1
TABLET, FILM COATED ORAL
Qty: 60 TABLET | Refills: 0 | Status: SHIPPED | OUTPATIENT
Start: 2018-11-07 | End: 2018-12-10

## 2018-11-07 RX ORDER — CITALOPRAM HYDROBROMIDE 40 MG/1
TABLET ORAL
Qty: 30 TABLET | Refills: 0 | Status: SHIPPED | OUTPATIENT
Start: 2018-11-07 | End: 2018-12-10

## 2018-11-07 RX ORDER — CLONAZEPAM 0.5 MG/1
TABLET ORAL
Qty: 60 TABLET | Refills: 0 | Status: SHIPPED | OUTPATIENT
Start: 2018-11-07 | End: 2019-01-08

## 2018-11-09 NOTE — TELEPHONE ENCOUNTER
Spoke with  wilmar Tejada scheduled for 11/15/18 with Dr. Faria. Instructions given for pt to come fasting for lab work.    Jovita JACKSON RN

## 2018-11-15 ENCOUNTER — OFFICE VISIT (OUTPATIENT)
Dept: FAMILY MEDICINE | Facility: CLINIC | Age: 38
End: 2018-11-15
Payer: MEDICAID

## 2018-11-15 VITALS
SYSTOLIC BLOOD PRESSURE: 114 MMHG | DIASTOLIC BLOOD PRESSURE: 84 MMHG | WEIGHT: 262.8 LBS | OXYGEN SATURATION: 98 % | TEMPERATURE: 97.4 F | HEIGHT: 71 IN | BODY MASS INDEX: 36.79 KG/M2 | HEART RATE: 58 BPM

## 2018-11-15 DIAGNOSIS — E03.2 HYPOTHYROIDISM DUE TO MEDICATION: ICD-10-CM

## 2018-11-15 DIAGNOSIS — Z23 NEED FOR PROPHYLACTIC VACCINATION AND INOCULATION AGAINST INFLUENZA: ICD-10-CM

## 2018-11-15 DIAGNOSIS — I10 ESSENTIAL HYPERTENSION: ICD-10-CM

## 2018-11-15 DIAGNOSIS — E66.01 MORBID OBESITY DUE TO EXCESS CALORIES (H): ICD-10-CM

## 2018-11-15 DIAGNOSIS — F20.9 SCHIZOPHRENIA, UNSPECIFIED TYPE (H): Primary | ICD-10-CM

## 2018-11-15 DIAGNOSIS — Z79.899 LITHIUM USE: ICD-10-CM

## 2018-11-15 LAB
ALBUMIN SERPL-MCNC: 4.1 G/DL (ref 3.4–5)
ALP SERPL-CCNC: 50 U/L (ref 40–150)
ALT SERPL W P-5'-P-CCNC: 22 U/L (ref 0–70)
ANION GAP SERPL CALCULATED.3IONS-SCNC: 4 MMOL/L (ref 3–14)
AST SERPL W P-5'-P-CCNC: 12 U/L (ref 0–45)
BILIRUB SERPL-MCNC: 0.4 MG/DL (ref 0.2–1.3)
BUN SERPL-MCNC: 12 MG/DL (ref 7–30)
CALCIUM SERPL-MCNC: 9.3 MG/DL (ref 8.5–10.1)
CHLORIDE SERPL-SCNC: 106 MMOL/L (ref 94–109)
CO2 SERPL-SCNC: 29 MMOL/L (ref 20–32)
CREAT SERPL-MCNC: 1 MG/DL (ref 0.66–1.25)
ERYTHROCYTE [DISTWIDTH] IN BLOOD BY AUTOMATED COUNT: 13 % (ref 10–15)
GFR SERPL CREATININE-BSD FRML MDRD: 84 ML/MIN/1.7M2
GLUCOSE SERPL-MCNC: 85 MG/DL (ref 70–99)
HCT VFR BLD AUTO: 41.6 % (ref 40–53)
HGB BLD-MCNC: 13.4 G/DL (ref 13.3–17.7)
LITHIUM SERPL-SCNC: 1.45 MMOL/L (ref 0.6–1.2)
MCH RBC QN AUTO: 29.3 PG (ref 26.5–33)
MCHC RBC AUTO-ENTMCNC: 32.2 G/DL (ref 31.5–36.5)
MCV RBC AUTO: 91 FL (ref 78–100)
PLATELET # BLD AUTO: 204 10E9/L (ref 150–450)
POTASSIUM SERPL-SCNC: 4.7 MMOL/L (ref 3.4–5.3)
PROT SERPL-MCNC: 7.8 G/DL (ref 6.8–8.8)
RBC # BLD AUTO: 4.57 10E12/L (ref 4.4–5.9)
SODIUM SERPL-SCNC: 139 MMOL/L (ref 133–144)
TSH SERPL DL<=0.005 MIU/L-ACNC: 1.76 MU/L (ref 0.4–4)
WBC # BLD AUTO: 6.1 10E9/L (ref 4–11)

## 2018-11-15 PROCEDURE — 84443 ASSAY THYROID STIM HORMONE: CPT | Performed by: FAMILY MEDICINE

## 2018-11-15 PROCEDURE — 99214 OFFICE O/P EST MOD 30 MIN: CPT | Mod: 25 | Performed by: FAMILY MEDICINE

## 2018-11-15 PROCEDURE — 90471 IMMUNIZATION ADMIN: CPT | Performed by: FAMILY MEDICINE

## 2018-11-15 PROCEDURE — 80053 COMPREHEN METABOLIC PANEL: CPT | Performed by: FAMILY MEDICINE

## 2018-11-15 PROCEDURE — 85027 COMPLETE CBC AUTOMATED: CPT | Performed by: FAMILY MEDICINE

## 2018-11-15 PROCEDURE — 80178 ASSAY OF LITHIUM: CPT | Performed by: FAMILY MEDICINE

## 2018-11-15 PROCEDURE — 36415 COLL VENOUS BLD VENIPUNCTURE: CPT | Performed by: FAMILY MEDICINE

## 2018-11-15 PROCEDURE — 90686 IIV4 VACC NO PRSV 0.5 ML IM: CPT | Performed by: FAMILY MEDICINE

## 2018-11-15 RX ORDER — SODIUM FLUORIDE 1.1 G/100G
CREAM ORAL
Refills: 3 | COMMUNITY
Start: 2018-10-04

## 2018-11-15 RX ORDER — LEVOTHYROXINE SODIUM 50 UG/1
TABLET ORAL
Qty: 90 TABLET | Refills: 3 | Status: SHIPPED | OUTPATIENT
Start: 2018-11-15 | End: 2019-10-22

## 2018-11-15 ASSESSMENT — ENCOUNTER SYMPTOMS
NERVOUS/ANXIOUS: 0
HEMATURIA: 0
HEADACHES: 0
WEAKNESS: 0
ARTHRALGIAS: 0
PALPITATIONS: 0
FREQUENCY: 0
SORE THROAT: 0
HEARTBURN: 0
DIZZINESS: 0
DYSURIA: 0
CHILLS: 0
PARESTHESIAS: 0
EYE PAIN: 0
MYALGIAS: 0
SHORTNESS OF BREATH: 0
CONSTIPATION: 0
COUGH: 0
ABDOMINAL PAIN: 0
DIARRHEA: 0
FEVER: 0
JOINT SWELLING: 0
NAUSEA: 0
HEMATOCHEZIA: 0

## 2018-11-15 NOTE — LETTER
[facility]  [address] [place] [id number]  [phone]        December 20, 2018    Brandt Wiley  [address] [facility] [address]              Dear Brandt Wiley    This is to remind you that your lithium level is due.    You may call our office at [phone] to schedule an appointment.    Please disregard this notice if you have already had your labs drawn or made an appointment.        Sincerely,        Sebastien Faria MD/ marci

## 2018-11-15 NOTE — PROGRESS NOTES
"S: Brandt Wiley is a 38 year old male here for his go over everything visit for group home    Schizophrenia: unclear who his mental health provider is [place] this time.  Old one retired.  We have been doing meds.  Stressed staff HAS to figure this out, the understand.  Update labs, group home full time    Htn: low on atenolol.  Will stop that.      Hypothyroid: refill and recheck    [place] use: update labs, fills    Morbid obesity: 37 BMI with schizophrenia    Problem list, med list, additional histories reviewed and updated, as indicated.      No cp or sob    O:/84 (BP Location: Right arm, Patient Position: Chair, Cuff Size: Adult Large)  Pulse 58  Temp 97.4  F (36.3  C) (Tympanic)  Ht 5' 10.5\" (1.791 m)  Wt 262 lb 12.8 oz (119.2 kg)  SpO2 98%  BMI 37.17 kg/m2  GEN: Alert and oriented, in no acute distress  CV: RRR, no murmur  RESP: lungs clear bilaterally, good effort  EXT: no edema or lesions noted in lower extremities  ABD: nontender.  No distention or mass appreciated.  Neck: neck supple without mass or lymphadenopathy  ENT: oropharynx clear, no exudate or palate/tonsil asymmetry    A: Schizophrenia: unclear who his mental health provider is at this time.  Old one retired.  We have been doing meds.  Stressed staff HAS to figure this out, they understand.  Update labs, group home full time  Htn: low on atenolol.  Will stop that.    Hypothyroid: refill and recheck  [place] use: update labs, fills  Morbid obesity: 37 BMI with schizophrenia    P: reviewed meds.  Update labs.  They will figure out his mental health provider.  Temp fills on meds OK   Stop atenolol.  See how BP does    Weight management plan: diet/exercise     "

## 2018-11-15 NOTE — PROGRESS NOTES
SUBJECTIVE:   CC: Brandt Wiley is an 38 year old male who presents for preventative health visit.     Physical   Annual:     Getting at least 3 servings of Calcium per day:  Yes    Bi-annual eye exam:  Yes    Dental care twice a year:  Yes    Sleep apnea or symptoms of sleep apnea:  None    Diet:  Other    Frequency of exercise:  None    Taking medications regularly:  Yes    Medication side effects:  None    Additional concerns today:  No        {additional problems to add (Optional):219004}    Today's PHQ-2 Score:   PHQ-2 ( 1999 Pf[employer]) 11/15/2018   Q1: Little interest or pleasure in doing things 0   Q2: Feeling down, depressed or hopeless 0   PHQ-2 Score 0   Q1: Little interest or pleasure in doing things Not at all   Q2: Feeling down, depressed or hopeless Not at all   PHQ-2 Score 0       Abuse: Current or Past(Physical, Sexual or Emotional)- NOT asked  Do you feel safe in your environment - Yes    Social History   Substance Use Topics     Smoking status: Never Smoker     Smokeless tobacco: Never Used     Alcohol use No     Alcohol Use 11/15/2018   If you drink alcohol do you typically have greater than 3 drinks per day OR greater than 7 drinks per week? Not Applicable   No flowsheet data found.    Last PSA: No results found for: PSA    Reviewed orders with patient. Reviewed health maintenance and updated orders accordingly - Yes  {Chronicprobdata (Optional):776128}    Reviewed and updated as needed this visit by clinical staff         Reviewed and updated as needed this visit by Provider        {HISTORY OPTIONS (Optional):994646}    Review of Systems   Constitutional: Negative for chills and fever.   HENT: Negative for congestion, ear pain, hearing loss and sore throat.    Eyes: Negative for pain and visual disturbance.   Respiratory: Negative for cough and shortness of breath.    Cardiovascular: Negative for chest pain, palpitations and peripheral edema.   Gastrointestinal: Negative for abdominal pain,  "constipation, diarrhea, heartburn, hematochezia and nausea.   Genitourinary: Negative for discharge, dysuria, frequency, genital sores, hematuria, impotence and urgency.   Musculoskeletal: Negative for arthralgias, joint swelling and myalgias.   Skin: Negative for rash.   Neurological: Negative for dizziness, weakness, headaches and paresthesias.   Psychiatric/Behavioral: Negative for mood changes. The patient is not nervous/anxious.      {MALE ROS (Optional):057990::\"CONSTITUTIONAL: NEGATIVE for fever, chills, change in weight\",\"INTEGUMENTARY/SKIN: NEGATIVE for worrisome rashes, moles or lesions\",\"EYES: NEGATIVE for vision changes or irritation\",\"ENT: NEGATIVE for ear, mouth and throat problems\",\"RESP: NEGATIVE for significant cough or SOB\",\"CV: NEGATIVE for chest pain, palpitations or peripheral edema\",\"GI: NEGATIVE for nausea, abdominal pain, heartburn, or change in bowel habits\",\" male: negative for dysuria, hematuria, decreased urinary stream, erectile dysfunction, urethral discharge\",\"MUSCULOSKELETAL: NEGATIVE for significant arthralgias or myalgia\",\"NEURO: NEGATIVE for weakness, dizziness or paresthesias\",\"PSYCHIATRIC: NEGATIVE for changes in mood or affect\"}    OBJECTIVE:   There were no vitals taken for this visit.    Physical Exam  {Exam Choices (Optional):858673}    {Diagnostic Test Results (Optional):623492::\"Diagnostic Test Results:\",\"none \"}    ASSESSMENT/PLAN:   {Diag Picklist:895089}    COUNSELING:   {MALE COUNSELING MESSAGES:081121::\"Reviewed preventive health counseling, as reflected in patient instructions\"}    BP Readings from Last 1 Encounters:   07/02/18 135/75     Estimated body mass index is 37.55 kg/(m^2) as calculated from the following:    Height as of 7/2/18: 5' 11.5\" (1.816 m).    Weight as of 7/2/18: 273 lb (123.8 kg).    {BP Counseling- Complete if BP >= 120/80  (Optional):451240}  {Weight Management Plan (ACO) Complete if BMI is abnormal-  Ages 18-64  BMI >24.9.  Age 65+ with BMI " <23 or >30 (Optional):979681}     reports that he has never smoked. He has never used smokeless tobacco.  {Tobacco Cessation -- Complete if patient is a smoker (Optional):033784}    Counseling Resources:  ATP IV Guidelines  Pooled Cohorts Equation Calculator  FRAX Risk Assessment  ICSI Preventive Guidelines  Dietary Guidelines for Americans, 2010  USDA's MyPlate  ASA Prophylaxis  Lung CA Screening    Sebastien Faria MD  [facility]

## 2018-11-15 NOTE — MR AVS SNAPSHOT
"              After Visit Summary   11/15/2018    Brandt Wiley    MRN: [id number]           Patient Information     Date Of Birth          1980        Visit Information        Provider Department      11/15/2018 10:20 AM Sebastien Faria MD [facility]        Today's Diagnoses     Schizophrenia, unspecified type (H)    -  1    Need for prophylactic vaccination and inoculation against influenza        Hypothyroidism due to medication        Essential hypertension        Lithium use        Morbid obesity due to excess calories (H)           Follow-ups after your visit        Follow-up notes from your care team     Return in about 1 year (around 11/15/2019) for Routine Visit.      Who to contact     If you have questions or need follow up information about today's clinic visit or your schedule please contact [facility] directly at [phone].  Normal or non-critical lab and imaging results will be communicated to you by MyChart, letter or phone within 4 business days after the clinic has received the results. If you do not hear from us within 7 days, please contact the clinic through MyChart or phone. If you have a critical or abnormal lab result, we will notify you by phone as soon as possible.  Submit refill requests through [employer] or call your pharmacy and they will forward the refill request to us. Please allow 3 business days for your refill to be completed.          Additional Information About Your Visit        Care EveryWhere ID     This is your Care EveryWhere ID. This could be used by other organizations to access your [facility] medical records  [id number]        Your Vitals Were     Pulse Temperature Height Pulse Oximetry BMI (Body Mass Index)       58 97.4  F (36.3  C) (Tympanic) 5' 10.5\" (1.791 m) 98% 37.17 kg/m2        Blood Pressure from Last 3 Encounters:   11/15/18 114/84   07/02/18 135/75   05/25/18 128/76    Weight from Last 3 Encounters:   11/15/18 262 " lb 12.8 oz (119.2 kg)   07/02/18 273 lb (123.8 kg)   05/25/18 278 lb 4 oz (126.2 kg)              We Performed the Following     CBC with platelets     Comprehensive metabolic panel (BMP + Alb, Alk Phos, ALT, AST, Total. Bili, TP)     FLU VACCINE, SPLIT VIRUS, IM (QUADRIVALENT) [67841]- >3 YRS     Lithium level     TSH with free T4 reflex     Vaccine Administration, Initial [17415]          Today's Medication Changes          These changes are accurate as of 11/15/18 10:57 AM.  If you have any questions, ask your nurse or doctor.               Stop taking these medicines if you haven't already. Please contact your care team if you have questions.     atenolol 25 MG tablet   Commonly known as:  TENORMIN   Stopped by:  Sebastien Faria MD                Where to get your medicines      These medications were sent to [facility] [address]  [address] [facility] [address]    Hours:  test rx sent successfully  2/8/05  kr Phone:  [phone]     levothyroxine 50 MCG tablet                Primary Care Provider Office Phone # Fax #    Sebastien Faria [phone] [address] [place] [address]        Equal Access to Services     SHANNAN TORRES AH: Hadii aad ku hadasho Soomaali, waaxda luqadaha, qaybta kaalmada adeegyada, waxay idiin haylucyn john mahajan. So [facility] [phone].    ATENCIÓN: Si habla español, tiene a nj disposición servicios gratuitos de asistencia lingüística. Llame al [phone].    We comply with applicable federal civil rights laws and [place] laws. We do not discriminate on the basis of race, color, national origin, age, disability, sex, sexual orientation, or gender identity.            Thank you!     Thank you for choosing [facility]  for your care. Our goal is always to provide you with excellent care. Hearing back from our patients is one way we can continue to improve our services.  Please take a few minutes to complete the written survey that you may receive in the mail after your visit with us. Thank you!             Your Updated Medication List - Protect others around you: Learn how to safely use, store and throw away your medicines at www.disposemymeds.org.          This list is accurate as of 11/15/18 10:57 AM.  Always use your most recent med list.                   Brand Name Dispense Instructions for use Diagnosis    citalopram 40 MG tablet    celeXA    30 tablet    TAKE ONE TABLET BY MOUTH EVERY DAY    Schizophrenia, unspecified type (H), Mental retardation       * clonazePAM 1 MG tablet    klonoPIN    20 tablet    Take 1 tablet (1 mg) by mouth daily as needed (for agitation)    Schizophrenia, unspecified type (H)       * clonazePAM 0.5 MG tablet    klonoPIN    60 tablet    TAKE ONE TABLET BY MOUTH TWICE DAILY AT 8 AM AND 8 PM    Schizophrenia, unspecified type (H)       DENTA 5000 PLUS 1.1 % Crea   Generic drug:  Sodium Fluoride      BRUSH TEETH WITH A PEA SIZED AMOUNT FOR 60 SECONDS TWICE DAILY. NOTHING BY MOUTH FOR 30 MINUTES FOLLOWING        divalproex sodium extended-release 500 MG 24 hr tablet    DEPAKOTE ER    90 tablet    TAKE THREE TABLETS BY MOUTH EVERY DAY    Schizophrenia, unspecified type (H), Mental retardation       haloperidol 10 MG tablet    HALDOL    105 tablet    TAKE ONE TABLET BY MOUTH TWICE DAILY AT 8 AM AND 12 PM AND TAKE 1 AND 1/2 TABLETS BY MOUTH AT BEDTIME    Schizophrenia, unspecified type (H)       levothyroxine 50 MCG tablet    SYNTHROID/LEVOTHROID    90 tablet    Take 1 tablet (50 mcg) by mouth daily    Hypothyroidism due to medication       lithium 300 MG capsule     120 capsule    Take 2 capsules (600 mg) by mouth 2 times daily (with meals)    Schizophrenia, unspecified type (H), Mental retardation       NEW MED      X-vitate 40%, apply thinly, severe dry feet        * QUEtiapine 300 MG tablet    SEROquel    30 tablet    TAKE ONE TABLET BY MOUTH EVERY  DAY AT 12 PM    Schizophrenia, unspecified type (H)       * QUEtiapine 400 MG tablet    SEROquel    60 tablet    TAKE ONE TABLET BY MOUTH TWICE DAILY    Schizophrenia, unspecified type (H), Mental retardation       SM VITAMIN B-6 100 MG Tabs   Generic drug:  pyridOXINE     30 tablet    TAKE ONE TABLET BY MOUTH EVERY DAY    Schizophrenia, unspecified type (H)       trihexyphenidyl 5 MG tablet    ARTANE    60 tablet    Take 1 tablet (5 mg) by mouth 2 times daily    Schizophrenia, unspecified type (H), Mental retardation       * Notice:  This list has 4 medication(s) that are the same as other medications prescribed for you. Read the directions carefully, and ask your doctor or other care provider to review them with you.

## 2018-11-15 NOTE — NURSING NOTE
"Chief Complaint   Patient presents with     Physical       Initial /84 (BP Location: Right arm, Patient Position: Chair, Cuff Size: Adult Large)  Pulse 58  Temp 97.4  F (36.3  C) (Tympanic)  Ht 5' 10.5\" (1.791 m)  Wt 262 lb 12.8 oz (119.2 kg)  SpO2 98%  BMI 37.17 kg/m2 Estimated body mass index is 37.17 kg/(m^2) as calculated from the following:    Height as of this encounter: 5' 10.5\" (1.791 m).    Weight as of this encounter: 262 lb 12.8 oz (119.2 kg).    Patient presents to the clinic using No DME    Health Maintenance that is potentially due pending provider review:  NONE    n/a    Is there anyone who you would like to be able to receive your results? Yes  If yes have patient fill out CASIMIRO    "

## 2018-11-19 RX ORDER — LITHIUM CARBONATE 300 MG
TABLET ORAL
Qty: 270 TABLET | Refills: 3 | Status: SHIPPED | OUTPATIENT
Start: 2018-11-19 | End: 2024-01-23

## 2018-12-10 DIAGNOSIS — F79 MENTAL RETARDATION: ICD-10-CM

## 2018-12-10 DIAGNOSIS — F20.9 SCHIZOPHRENIA, UNSPECIFIED TYPE (H): ICD-10-CM

## 2018-12-11 NOTE — TELEPHONE ENCOUNTER
Requested Prescriptions   Pending Prescriptions Disp Refills     haloperidol (HALDOL) 10 MG tablet [Pharmacy Med Name: HALOPERIDOL 10 MG TABLET] 105 tablet 0     Sig: TAKE ONE TABLET BY MOUTH TWICE DAILY AT 8 AM AND 12 PM AND TAKE 1 AND 1/2 TABLETS BY MOUTH AT BEDTIME    There is no refill protocol information for this order        QUEtiapine (SEROQUEL) 400 MG tablet [Pharmacy Med Name: QUETIAPINE FUMARATE 400 MG TABLET] 60 tablet 0     Sig: TAKE ONE TABLET BY MOUTH TWICE DAILY    Antipsychotic Medications Failed - 12/10/2018  4:11 PM       Failed - Lipid panel on file within the past 12 months    Recent Labs   Lab Test 09/22/17  0821  02/25/15  0855   CHOL 175   < > 187   TRIG 218*   < > 122   HDL 45   < > 47   LDL 86   < > 116   NHDL 130*   < >  --    VLDL  --   --  24   CHOLHDLRATIO  --   --  4.0    < > = values in this interval not displayed.              Passed - Blood pressure under 140/90 in past 12 months    BP Readings from Last 3 Encounters:   11/15/18 114/84   07/02/18 135/75   05/25/18 128/76                Passed - Patient is 12 years of age or older       Passed - CBC on file in past 12 months    Recent Labs   Lab Test 11/15/18  1045   WBC 6.1   RBC 4.57   HGB 13.4   HCT 41.6                   Passed - Heart Rate on file within past 12 months    Pulse Readings from Last 3 Encounters:   11/15/18 58   07/02/18 58   05/25/18 101              Passed - A1c or Glucose on file in past 12 months    Recent Labs   Lab Test 11/15/18  1045 08/23/17  1433   GLC 85 89   A1C  --  4.7       Please review patients last 3 weights. If a weight gain of >10 lbs exists, you may refill the prescription once after instructing the patient to schedule an appointment within the next 30 days.    Wt Readings from Last 3 Encounters:   11/15/18 119.2 kg (262 lb 12.8 oz)   07/02/18 123.8 kg (273 lb)   05/25/18 126.2 kg (278 lb 4 oz)            Passed - Recent (6 mo) or future (30 days) visit within the authorizing provider's  "specialty    Patient had office visit in the last 6 months or has a visit in the next 30 days with authorizing provider or within the authorizing provider's specialty.  See \"Patient Info\" tab in inbasket, or \"Choose Columns\" in Meds & Orders section of the refill encounter.            divalproex sodium extended-release (DEPAKOTE ER) 500 MG 24 hr tablet [Pharmacy Med Name: DIVALPROEX SODIUM  MG TAB ER 24H] 90 tablet 0     Sig: TAKE THREE TABLETS BY MOUTH EVERY DAY    Anti-Seizure Meds Protocol  Failed - 12/10/2018  4:11 PM       Failed - Review Authorizing provider's last note.     Refer to last progress notes: confirm request is for original authorizing provider (cannot be through other providers).         Passed - Recent (12 mo) or future (30 days) visit within the authorizing provider's specialty    Patient had office visit in the last 12 months or has a visit in the next 30 days with authorizing provider or within the authorizing provider's specialty.  See \"Patient Info\" tab in inbasket, or \"Choose Columns\" in Meds & Orders section of the refill encounter.             Passed - Normal CBC on file in past 26 months    Recent Labs   Lab Test 11/15/18  1045   WBC 6.1   RBC 4.57   HGB 13.4   HCT 41.6                   Passed - Normal ALT or AST on file in past 26 months    Recent Labs   Lab Test 11/15/18  1045   ALT 22     Recent Labs   Lab Test 11/15/18  1045   AST 12            Passed - Normal platelet count on file in past 26 months    Recent Labs   Lab Test 11/15/18  1045                 Passed - Depakote level within therapeutic range in past 26 months    Lab Results   Component Value Date    ANGELLA 99 09/22/2017     Depakote level must be checked 2-4 weeks after dosage change.          citalopram (CELEXA) 40 MG tablet [Pharmacy Med Name: CITALOPRAM HBR 40 MG TABLET] 30 tablet 0     Sig: TAKE ONE TABLET BY MOUTH EVERY DAY    SSRIs Protocol Passed - 12/10/2018  4:11 PM       Passed - Recent (12 " "mo) or future (30 days) visit within the authorizing provider's specialty    Patient had office visit in the last 12 months or has a visit in the next 30 days with authorizing provider or within the authorizing provider's specialty.  See \"Patient Info\" tab in inbasket, or \"Choose Columns\" in Meds & Orders section of the refill encounter.             Passed - Patient is age 18 or older        QUEtiapine (SEROQUEL) 300 MG tablet [Pharmacy Med Name: QUETIAPINE FUMARATE 300 MG TABLET] 30 tablet 0     Sig: TAKE ONE TABLET BY MOUTH EVERY DAY AT 12 PM    Antipsychotic Medications Failed - 12/10/2018  4:11 PM       Failed - Lipid panel on file within the past 12 months    Recent Labs   Lab Test 09/22/17  0821  02/25/15  0855   CHOL 175   < > 187   TRIG 218*   < > 122   HDL 45   < > 47   LDL 86   < > 116   NHDL 130*   < >  --    VLDL  --   --  24   CHOLHDLRATIO  --   --  4.0    < > = values in this interval not displayed.              Passed - Blood pressure under 140/90 in past 12 months    BP Readings from Last 3 Encounters:   11/15/18 114/84   07/02/18 135/75   05/25/18 128/76                Passed - Patient is 12 years of age or older       Passed - CBC on file in past 12 months    Recent Labs   Lab Test 11/15/18  1045   WBC 6.1   RBC 4.57   HGB 13.4   HCT 41.6                   Passed - Heart Rate on file within past 12 months    Pulse Readings from Last 3 Encounters:   11/15/18 58   07/02/18 58   05/25/18 101              Passed - A1c or Glucose on file in past 12 months    Recent Labs   Lab Test 11/15/18  1045 08/23/17  1433   GLC 85 89   A1C  --  4.7       Please review patients last 3 weights. If a weight gain of >10 lbs exists, you may refill the prescription once after instructing the patient to schedule an appointment within the next 30 days.    Wt Readings from Last 3 Encounters:   11/15/18 119.2 kg (262 lb 12.8 oz)   07/02/18 123.8 kg (273 lb)   05/25/18 126.2 kg (278 lb 4 oz)            Passed - Recent " "(6 mo) or future (30 days) visit within the authorizing provider's specialty    Patient had office visit in the last 6 months or has a visit in the next 30 days with authorizing provider or within the authorizing provider's specialty.  See \"Patient Info\" tab in inbasket, or \"Choose Columns\" in Meds & Orders section of the refill encounter.          haloperidol (HALDOL) 10 MG tablet  Last Written Prescription Date:  11/07/2018  Last Fill Quantity: 105tablet,  # refills: 0   Last office visit: 11/15/2018 with prescribing provider:  SHOBHA Faria   Future Office Visit:      QUEtiapine (SEROQUEL) 400 MG tablet  Last Written Prescription Date:  11/07/2018  Last Fill Quantity: 60 tablet,  # refills: 0   Last office visit: 11/15/2018 with prescribing provider:  SHOBHA Faria   Future Office Visit:      divalproex sodium extended-release (DEPAKOTE ER) 500 MG 24 hr tablet  Last Written Prescription Date:  11/07/2018  Last Fill Quantity: 90 tablet,  # refills: 0   Last office visit: 11/15/2018 with prescribing provider:  SHOBHA Faria   Future Office Visit:      citalopram (CELEXA) 40 MG tablet  Last Written Prescription Date:  11/07/2018  Last Fill Quantity: 30 tablet,  # refills: 0   Last office visit: 11/15/2018 with prescribing provider:  SHOBHA Faria   Future Office Visit:      QUEtiapine (SEROQUEL) 300 MG tablet  Last Written Prescription Date:  11/07/2018  Last Fill Quantity: 30 tablet,  # refills: 0   Last office visit: 11/15/2018 with prescribing provider:  SHOBHA Faria   Future Office Visit:      Petty Champagne RT (R) (M)        "

## 2018-12-12 RX ORDER — QUETIAPINE FUMARATE 300 MG/1
TABLET, FILM COATED ORAL
Qty: 30 TABLET | Refills: 0 | Status: SHIPPED | OUTPATIENT
Start: 2018-12-12 | End: 2019-01-08

## 2018-12-12 RX ORDER — HALOPERIDOL 10 MG/1
TABLET ORAL
Qty: 105 TABLET | Refills: 0 | Status: SHIPPED | OUTPATIENT
Start: 2018-12-12 | End: 2019-01-08

## 2018-12-12 RX ORDER — QUETIAPINE FUMARATE 400 MG/1
TABLET, FILM COATED ORAL
Qty: 60 TABLET | Refills: 0 | Status: SHIPPED | OUTPATIENT
Start: 2018-12-12 | End: 2019-01-08

## 2018-12-12 RX ORDER — CITALOPRAM HYDROBROMIDE 40 MG/1
TABLET ORAL
Qty: 30 TABLET | Refills: 0 | Status: SHIPPED | OUTPATIENT
Start: 2018-12-12 | End: 2019-01-08

## 2018-12-12 RX ORDER — DIVALPROEX SODIUM 500 MG/1
TABLET, EXTENDED RELEASE ORAL
Qty: 90 TABLET | Refills: 0 | Status: SHIPPED | OUTPATIENT
Start: 2018-12-12 | End: 2019-01-08

## 2018-12-14 DIAGNOSIS — F20.9 SCHIZOPHRENIA, UNSPECIFIED TYPE (H): ICD-10-CM

## 2018-12-15 NOTE — TELEPHONE ENCOUNTER
clonazePAM (KLONOPIN) 0.5 MG tablet      Last Written Prescription Date:  11/7/18  Last Fill Quantity: 60,   # refills: 0  Last Office Visit: 11/15/18  Future Office visit:       Routing refill request to provider for review/approval because:  Drug not on the [facility], [facility] or [facility] refill protocol or controlled substance

## 2018-12-17 RX ORDER — CLONAZEPAM 1 MG/1
TABLET ORAL
Qty: 20 TABLET | Refills: 0 | Status: SHIPPED | OUTPATIENT
Start: 2018-12-17

## 2018-12-17 NOTE — TELEPHONE ENCOUNTER
[place] reviewed:  reviewed- no concerns    [place]  [email or web address]

## 2019-01-08 DIAGNOSIS — F20.9 SCHIZOPHRENIA, UNSPECIFIED TYPE (H): ICD-10-CM

## 2019-01-08 DIAGNOSIS — I10 ESSENTIAL HYPERTENSION: ICD-10-CM

## 2019-01-08 DIAGNOSIS — F79 MENTAL RETARDATION: ICD-10-CM

## 2019-01-08 RX ORDER — ATENOLOL 25 MG/1
TABLET ORAL
Qty: 0.1 TABLET | Refills: 0 | Status: SHIPPED | OUTPATIENT
Start: 2019-01-08 | End: 2019-01-08

## 2019-01-08 NOTE — TELEPHONE ENCOUNTER
Requested Prescriptions   Pending Prescriptions Disp Refills     clonazePAM (KLONOPIN) 0.5 MG tablet [Pharmacy Med Name: CLONAZEPAM 0.5 MG TABLET] 60 tablet      Sig: TAKE ONE TABLET BY MOUTH TWICE DAILY AT 8 AM AND 8 PM    There is no refill protocol information for this order        haloperidol (HALDOL) 10 MG tablet [Pharmacy Med Name: HALOPERIDOL 10 MG TABLET] 105 tablet 0     Sig: TAKE ONE TABLET BY MOUTH TWICE DAILY AT 8 AM AND 12 PM AND TAKE 1 AND 1/2 TABLETS BY MOUTH AT BEDTIME    There is no refill protocol information for this order        QUEtiapine (SEROQUEL) 400 MG tablet [Pharmacy Med Name: QUETIAPINE FUMARATE 400 MG TABLET] 60 tablet 0     Sig: TAKE ONE TABLET BY MOUTH TWICE DAILY    Antipsychotic Medications Failed - 1/8/2019  3:02 PM       Failed - Lipid panel on file within the past 12 months    Recent Labs   Lab Test 09/22/17  0821  02/25/15  0855   CHOL 175   < > 187   TRIG 218*   < > 122   HDL 45   < > 47   LDL 86   < > 116   NHDL 130*   < >  --    VLDL  --   --  24   CHOLHDLRATIO  --   --  4.0    < > = values in this interval not displayed.              Passed - Blood pressure under 140/90 in past 12 months    BP Readings from Last 3 Encounters:   11/15/18 114/84   07/02/18 135/75   05/25/18 128/76                Passed - Patient is 12 years of age or older       Passed - CBC on file in past 12 months    Recent Labs   Lab Test 11/15/18  1045   WBC 6.1   RBC 4.57   HGB 13.4   HCT 41.6                   Passed - Heart Rate on file within past 12 months    Pulse Readings from Last 3 Encounters:   11/15/18 58   07/02/18 58   05/25/18 101              Passed - A1c or Glucose on file in past 12 months    Recent Labs   Lab Test 11/15/18  1045 08/23/17  1433   GLC 85 89   A1C  --  4.7       Please review patients last 3 weights. If a weight gain of >10 lbs exists, you may refill the prescription once after instructing the patient to schedule an appointment within the next 30 days.    Wt Readings  "from Last 3 Encounters:   11/15/18 119.2 kg (262 lb 12.8 oz)   07/02/18 123.8 kg (273 lb)   05/25/18 126.2 kg (278 lb 4 oz)            Passed - Medication is active on med list       Passed - Recent (6 mo) or future (30 days) visit within the authorizing provider's specialty    Patient had office visit in the last 6 months or has a visit in the next 30 days with authorizing provider or within the authorizing provider's specialty.  See \"Patient Info\" tab in inbasket, or \"Choose Columns\" in Meds & Orders section of the refill encounter.            QUEtiapine (SEROQUEL) 300 MG tablet [Pharmacy Med Name: QUETIAPINE FUMARATE 300 MG TABLET] 30 tablet 0     Sig: TAKE ONE TABLET BY MOUTH EVERY DAY AT 12 PM    Antipsychotic Medications Failed - 1/8/2019  3:02 PM       Failed - Lipid panel on file within the past 12 months    Recent Labs   Lab Test 09/22/17  0821  02/25/15  0855   CHOL 175   < > 187   TRIG 218*   < > 122   HDL 45   < > 47   LDL 86   < > 116   NHDL 130*   < >  --    VLDL  --   --  24   CHOLHDLRATIO  --   --  4.0    < > = values in this interval not displayed.              Passed - Blood pressure under 140/90 in past 12 months    BP Readings from Last 3 Encounters:   11/15/18 114/84   07/02/18 135/75   05/25/18 128/76                Passed - Patient is 12 years of age or older       Passed - CBC on file in past 12 months    Recent Labs   Lab Test 11/15/18  1045   WBC 6.1   RBC 4.57   HGB 13.4   HCT 41.6                   Passed - Heart Rate on file within past 12 months    Pulse Readings from Last 3 Encounters:   11/15/18 58   07/02/18 58   05/25/18 101              Passed - A1c or Glucose on file in past 12 months    Recent Labs   Lab Test 11/15/18  1045 08/23/17  1433   GLC 85 89   A1C  --  4.7       Please review patients last 3 weights. If a weight gain of >10 lbs exists, you may refill the prescription once after instructing the patient to schedule an appointment within the next 30 days.    Wt " "Readings from Last 3 Encounters:   11/15/18 119.2 kg (262 lb 12.8 oz)   07/02/18 123.8 kg (273 lb)   05/25/18 126.2 kg (278 lb 4 oz)            Passed - Medication is active on med list       Passed - Recent (6 mo) or future (30 days) visit within the authorizing provider's specialty    Patient had office visit in the last 6 months or has a visit in the next 30 days with authorizing provider or within the authorizing provider's specialty.  See \"Patient Info\" tab in inbasket, or \"Choose Columns\" in Meds & Orders section of the refill encounter.            citalopram (CELEXA) 40 MG tablet [Pharmacy Med Name: CITALOPRAM HBR 40 MG TABLET] 30 tablet 0     Sig: TAKE ONE TABLET BY MOUTH EVERY DAY    SSRIs Protocol Passed - 1/8/2019  3:02 PM       Passed - Recent (12 mo) or future (30 days) visit within the authorizing provider's specialty    Patient had office visit in the last 12 months or has a visit in the next 30 days with authorizing provider or within the authorizing provider's specialty.  See \"Patient Info\" tab in inbasket, or \"Choose Columns\" in Meds & Orders section of the refill encounter.             Passed - Medication is active on med list       Passed - Patient is age 18 or older        divalproex sodium extended-release (DEPAKOTE ER) 500 MG 24 hr tablet [Pharmacy Med Name: DIVALPROEX SODIUM  MG TAB ER 24H] 90 tablet 0     Sig: TAKE THREE TABLETS BY MOUTH EVERY DAY    Anti-Seizure Meds Protocol  Failed - 1/8/2019  3:02 PM       Failed - Review Authorizing provider's last note.     Refer to last progress notes: confirm request is for original authorizing provider (cannot be through other providers).         Passed - Recent (12 mo) or future (30 days) visit within the authorizing provider's specialty    Patient had office visit in the last 12 months or has a visit in the next 30 days with authorizing provider or within the authorizing provider's specialty.  See \"Patient Info\" tab in inbasket, or \"Choose " "Columns\" in Meds & Orders section of the refill encounter.             Passed - Normal CBC on file in past 26 months    Recent Labs   Lab Test 11/15/18  1045   WBC 6.1   RBC 4.57   HGB 13.4   HCT 41.6                   Passed - Normal ALT or AST on file in past 26 months    Recent Labs   Lab Test 11/15/18  1045   ALT 22     Recent Labs   Lab Test 11/15/18  1045   AST 12            Passed - Normal platelet count on file in past 26 months    Recent Labs   Lab Test 11/15/18  1045                 Passed - Depakote level within therapeutic range in past 26 months    Lab Results   Component Value Date    ANGELLA 99 09/22/2017     Depakote level must be checked 2-4 weeks after dosage change.         Passed - Medication is active on med list        trihexyphenidyl (ARTANE) 5 MG tablet [Pharmacy Med Name: TRIHEXYPHENIDYL HCL 5 MG TABLET] 60 tablet 3     Sig: TAKE ONE TABLET BY MOUTH TWICE DAILY    There is no refill protocol information for this order        Last Written Prescription Date:  Seroquel, celexa, haldol, depakote--12/12/18  Last Fill Quantity: 1mo,  # refills: 0   Last office visit: 11/15/2018 with prescribing provider:      Future Office Visit:    Last Written Prescription Date:  Artane-9/12/18  Last Fill Quantity: 60,  # refills: 3   Last office visit: 11/15/2018 with prescribing provider:      Future Office Visit:    Last Written Prescription Date:  Klonopin.5mg  Last Fill Quantity: 60,  # refills: 11/7/18 (FYI: 1mg was filled 12/17/18 for 20)   Last office visit: 11/15/2018 with prescribing provider:    Future Office Visit:      "

## 2019-01-08 NOTE — TELEPHONE ENCOUNTER
"Requested Prescriptions   Pending Prescriptions Disp Refills                                           QUEtiapine (SEROQUEL) 400 MG tablet [Pharmacy Med Name: QUETIAPINE FUMARATE 400 MG TABLET] 60 tablet 0     Sig: TAKE ONE TABLET BY MOUTH TWICE DAILY    Antipsychotic Medications Failed - 1/8/2019  2:25 PM       Failed - Lipid panel on file within the past 12 months    Recent Labs   Lab Test 09/22/17  0821  02/25/15  0855   CHOL 175   < > 187   TRIG 218*   < > 122   HDL 45   < > 47   LDL 86   < > 116   NHDL 130*   < >  --    VLDL  --   --  24   CHOLHDLRATIO  --   --  4.0    < > = values in this interval not displayed.              Passed - Blood pressure under 140/90 in past 12 months    BP Readings from Last 3 Encounters:   11/15/18 114/84   07/02/18 135/75   05/25/18 128/76                Passed - Patient is 12 years of age or older       Passed - CBC on file in past 12 months    Recent Labs   Lab Test 11/15/18  1045   WBC 6.1   RBC 4.57   HGB 13.4   HCT 41.6                   Passed - Heart Rate on file within past 12 months    Pulse Readings from Last 3 Encounters:   11/15/18 58   07/02/18 58   05/25/18 101              Passed - A1c or Glucose on file in past 12 months    Recent Labs   Lab Test 11/15/18  1045 08/23/17  1433   GLC 85 89   A1C  --  4.7       Please review patients last 3 weights. If a weight gain of >10 lbs exists, you may refill the prescription once after instructing the patient to schedule an appointment within the next 30 days.    Wt Readings from Last 3 Encounters:   11/15/18 119.2 kg (262 lb 12.8 oz)   07/02/18 123.8 kg (273 lb)   05/25/18 126.2 kg (278 lb 4 oz)            Passed - Medication is active on med list       Passed - Recent (6 mo) or future (30 days) visit within the authorizing provider's specialty    Patient had office visit in the last 6 months or has a visit in the next 30 days with authorizing provider or within the authorizing provider's specialty.  See \"Patient " "Info\" tab in inbasket, or \"Choose Columns\" in Meds & Orders section of the refill encounter.      Last Written Prescription Date:  1212/18  Last Fill Quantity: 60,  # refills: 0   Last office visit: 11/15/2018 with prescribing provider:     Future Office Visit:            QUEtiapine (SEROQUEL) 300 MG tablet [Pharmacy Med Name: QUETIAPINE FUMARATE 300 MG TABLET] 30 tablet 0     Sig: TAKE ONE TABLET BY MOUTH EVERY DAY AT 12 PM    Antipsychotic Medications Failed - 1/8/2019  2:25 PM       Failed - Lipid panel on file within the past 12 months    Recent Labs   Lab Test 09/22/17  0821  02/25/15  0855   CHOL 175   < > 187   TRIG 218*   < > 122   HDL 45   < > 47   LDL 86   < > 116   NHDL 130*   < >  --    VLDL  --   --  24   CHOLHDLRATIO  --   --  4.0    < > = values in this interval not displayed.              Passed - Blood pressure under 140/90 in past 12 months    BP Readings from Last 3 Encounters:   11/15/18 114/84   07/02/18 135/75   05/25/18 128/76                Passed - Patient is 12 years of age or older       Passed - CBC on file in past 12 months    Recent Labs   Lab Test 11/15/18  1045   WBC 6.1   RBC 4.57   HGB 13.4   HCT 41.6                   Passed - Heart Rate on file within past 12 months    Pulse Readings from Last 3 Encounters:   11/15/18 58   07/02/18 58   05/25/18 101              Passed - A1c or Glucose on file in past 12 months    Recent Labs   Lab Test 11/15/18  1045 08/23/17  1433   GLC 85 89   A1C  --  4.7       Please review patients last 3 weights. If a weight gain of >10 lbs exists, you may refill the prescription once after instructing the patient to schedule an appointment within the next 30 days.    Wt Readings from Last 3 Encounters:   11/15/18 119.2 kg (262 lb 12.8 oz)   07/02/18 123.8 kg (273 lb)   05/25/18 126.2 kg (278 lb 4 oz)            Passed - Medication is active on med list       Passed - Recent (6 mo) or future (30 days) visit within the authorizing provider's specialty " "   Patient had office visit in the last 6 months or has a visit in the next 30 days with authorizing provider or within the authorizing provider's specialty.  See \"Patient Info\" tab in inbasket, or \"Choose Columns\" in Meds & Orders section of the refill encounter.      Last Written Prescription Date:  12/12/18  Last Fill Quantity: 30,  # refills: 0   Last office visit: 11/15/2018 with prescribing provider:     Future Office Visit:              citalopram (CELEXA) 40 MG tablet [Pharmacy Med Name: CITALOPRAM HBR 40 MG TABLET] 30 tablet 0     Sig: TAKE ONE TABLET BY MOUTH EVERY DAY    SSRIs Protocol Passed - 1/8/2019  2:25 PM       Passed - Recent (12 mo) or future (30 days) visit within the authorizing provider's specialty    Patient had office visit in the last 12 months or has a visit in the next 30 days with authorizing provider or within the authorizing provider's specialty.  See \"Patient Info\" tab in inbasket, or \"Choose Columns\" in Meds & Orders section of the refill encounter.      Last Written Prescription Date:  12/12/18  Last Fill Quantity: 30,  # refills: 0   Last office visit: 11/15/2018 with prescribing provider:     Future Office Visit:               Passed - Medication is active on med list       Passed - Patient is age 18 or older        divalproex sodium extended-release (DEPAKOTE ER) 500 MG 24 hr tablet [Pharmacy Med Name: DIVALPROEX SODIUM  MG TAB ER 24H] 90 tablet 0     Sig: TAKE THREE TABLETS BY MOUTH EVERY DAY    Anti-Seizure Meds Protocol  Failed - 1/8/2019  2:25 PM       Failed - Review Authorizing provider's last note.     Refer to last progress notes: confirm request is for original authorizing provider (cannot be through other providers).         Passed - Recent (12 mo) or future (30 days) visit within the authorizing provider's specialty    Patient had office visit in the last 12 months or has a visit in the next 30 days with authorizing provider or within the authorizing provider's " "specialty.  See \"Patient Info\" tab in inbasket, or \"Choose Columns\" in Meds & Orders section of the refill encounter.      Last Written Prescription Date:  12/12/18  Last Fill Quantity: 90,  # refills: 0   Last office visit: 11/15/2018 with prescribing provider:     Future Office Visit:               Passed - Normal CBC on file in past 26 months    Recent Labs   Lab Test 11/15/18  1045   WBC 6.1   RBC 4.57   HGB 13.4   HCT 41.6                   Passed - Normal ALT or AST on file in past 26 months    Recent Labs   Lab Test 11/15/18  1045   ALT 22     Recent Labs   Lab Test 11/15/18  1045   AST 12            Passed - Normal platelet count on file in past 26 months    Recent Labs   Lab Test 11/15/18  1045                 Passed - Depakote level within therapeutic range in past 26 months    Lab Results   Component Value Date    ANGELLA 99 09/22/2017     Depakote level must be checked 2-4 weeks after dosage change.         Passed - Medication is active on med list        trihexyphenidyl (ARTANE) 5 MG tablet [Pharmacy Med Name: TRIHEXYPHENIDYL HCL 5 MG TABLET] 60 tablet 3     Sig: TAKE ONE TABLET BY MOUTH TWICE DAILY    There is no refill protocol information for this order          Last Written Prescription Date:  9/12/18  Last Fill Quantity: 60,   # refills: 3  Last Office Visit: 11/15/18  Future Office visit:       Routing refill request to provider for review/approval because:  Drug not on the [facility], [facility] or [facility] refill protocol or controlled substance          Controlled Substance Refill Request for clonazePAM (KLONOPIN) 0.5 MG tablet  Problem List Complete:  Yes   checked in past 3 months?  No, route to RN      Last Written Prescription Date:  11/7/18  Last Fill Quantity: 60,  # refills: 0   Last office visit: 11/15/2018 with prescribing provider:     Future Office Visit:      Controlled Substance Refill Request for haloperidol (HALDOL) 10 MG tablet  Problem List Complete:  Yes   checked in past 3 " months?  No, route to RN            Last Written Prescription Date:  12/12/18  Last Fill Quantity: 105,  # refills: 0   Last office visit: 11/15/2018 with prescribing provider:     Future Office Visit:

## 2019-01-08 NOTE — TELEPHONE ENCOUNTER
"Requested Prescriptions   Pending Prescriptions Disp Refills     atenolol (TENORMIN) 25 MG tablet [Pharmacy Med Name: ATENOLOL 25 MG TABLET] 30 tablet      Sig: TAKE ONE TABLET BY MOUTH EVERY DAY    Beta-Blockers Protocol Failed - 1/8/2019  2:18 PM       Failed - Medication is active on med list       Passed - Blood pressure under 140/90 in past 12 months    BP Readings from Last 3 Encounters:   11/15/18 114/84   07/02/18 135/75   05/25/18 128/76                Passed - Patient is age 6 or older       Passed - Recent (12 mo) or future (30 days) visit within the authorizing provider's specialty    Patient had office visit in the last 12 months or has a visit in the next 30 days with authorizing provider or within the authorizing provider's specialty.  See \"Patient Info\" tab in inbasket, or \"Choose Columns\" in Meds & Orders section of the refill encounter.              atenolol (TENORMIN) 25 MG tablet (Discontinued) 90 tablet 1 7/16/2018 11/15/2018 No   Sig: Take 1 tablet (25 mg) by mouth daily   Sent to pharmacy as: atenolol (TENORMIN) 25 MG tablet   Class: E-Prescribe   Notes to Pharmacy: This prescription was filled on 7/16/2018. Any refills authorized will be placed on file.   Order: [id number]   E-Prescribing Status: Receipt confirmed by pharmacy (7/16/2018  4:14 PM CDT)   Printout Tracking       "

## 2019-01-09 RX ORDER — HALOPERIDOL 10 MG/1
TABLET ORAL
Qty: 105 TABLET | Refills: 0 | Status: SHIPPED | OUTPATIENT
Start: 2019-01-09 | End: 2019-02-04

## 2019-01-09 RX ORDER — TRIHEXYPHENIDYL HYDROCHLORIDE 5 MG/1
TABLET ORAL
Qty: 60 TABLET | Refills: 3 | Status: SHIPPED | OUTPATIENT
Start: 2019-01-09

## 2019-01-09 RX ORDER — CITALOPRAM HYDROBROMIDE 40 MG/1
TABLET ORAL
Qty: 30 TABLET | Refills: 0 | Status: SHIPPED | OUTPATIENT
Start: 2019-01-09 | End: 2019-02-04

## 2019-01-09 RX ORDER — QUETIAPINE FUMARATE 300 MG/1
TABLET, FILM COATED ORAL
Qty: 30 TABLET | Refills: 0 | Status: SHIPPED | OUTPATIENT
Start: 2019-01-09 | End: 2019-02-04

## 2019-01-09 RX ORDER — QUETIAPINE FUMARATE 400 MG/1
TABLET, FILM COATED ORAL
Qty: 60 TABLET | Refills: 0 | Status: SHIPPED | OUTPATIENT
Start: 2019-01-09 | End: 2019-02-04

## 2019-01-09 RX ORDER — DIVALPROEX SODIUM 500 MG/1
TABLET, EXTENDED RELEASE ORAL
Qty: 90 TABLET | Refills: 0 | Status: SHIPPED | OUTPATIENT
Start: 2019-01-09 | End: 2019-02-04

## 2019-01-09 RX ORDER — CLONAZEPAM 0.5 MG/1
TABLET ORAL
Qty: 60 TABLET | Refills: 0 | Status: SHIPPED | OUTPATIENT
Start: 2019-01-09 | End: 2019-02-04

## 2019-01-09 NOTE — TELEPHONE ENCOUNTER
Please, please, please call group home and find out when they are going to have a mental health provider for lester to take over his psychiatric medications.

## 2019-01-25 ENCOUNTER — TELEPHONE (OUTPATIENT)
Dept: FAMILY MEDICINE | Facility: CLINIC | Age: 39
End: 2019-01-25

## 2019-01-25 NOTE — TELEPHONE ENCOUNTER
Patient was told to return for a lithium level, reminder letter was sent to patient on 12/20/2018, patient has still not scheduled an appointment.

## 2019-02-02 ENCOUNTER — NURSE TRIAGE (OUTPATIENT)
Dept: NURSING | Facility: CLINIC | Age: 39
End: 2019-02-02

## 2019-02-02 NOTE — TELEPHONE ENCOUNTER
"The group home where the patient lives calling about the missing Atenolol refill from 1/9/19. I looked and the office visit from 11/15/19 says:    \"Stop atenolol.  See how BP does\" per Sebastien Faria MD    I let shawn from the group home know this. They did receive a refill of it last time so today with be him first day without atenolol. Advised to watch BP and call back if any concerns. Shawn voices understanding and is in agreement with this plan.     Kaley Oliver RN, [facility]          Reason for Disposition    [1] Request for URGENT new prescription or refill of \"essential\" medication (i.e., likelihood of harm to patient if not taken) AND [2] triager unable to fill per unit policy    Additional Information    Negative: Drug overdose and nurse unable to answer question    Negative: Caller requesting information not related to medicine    Negative: Caller requesting a prescription for Strep throat and has a positive culture result    Negative: Rash while taking a medication or within 3 days of stopping it    Negative: Immunization reaction suspected    Negative: [1] Asthma and [2] having symptoms of asthma (cough, wheezing, etc)    Negative: MORE THAN A DOUBLE DOSE of a prescription or over-the-counter (OTC) drug    Negative: [1] DOUBLE DOSE (an extra dose or lesser amount) of over-the-counter (OTC) drug AND [2] any symptoms (e.g., dizziness, nausea, pain, sleepiness)    Negative: [1] DOUBLE DOSE (an extra dose or lesser amount) of prescription drug AND [2] any symptoms (e.g., dizziness, nausea, pain, sleepiness)    Negative: Took another person's prescription drug    Negative: [1] DOUBLE DOSE (an extra dose or lesser amount) of prescription drug AND [2] NO symptoms (Exception: a double dose of antibiotics)    Negative: Diabetes drug error or overdose (e.g., insulin or extra dose)    Protocols used: MEDICATION QUESTION CALL-ADULT-    "

## 2019-02-02 NOTE — TELEPHONE ENCOUNTER
Regarding: Staff calling from nursing home about patients medication   ----- Message from Edgar Okeefe sent at 2/2/2019  8:25 AM CST -----  Reason for call:  Medication   If this is a refill request, has the caller requested the refill from the pharmacy already? Yes  Will the patient be using a [facility]? No  Name of the pharmacy and phone number for the current request: [facility] [phone]    Name of the medication requested: Atenolol    Other request: N/A    Phone number to reach patient:  Home number on file [phone] (home)    Best Time:  Anytime    Can we leave a detailed message on this number?  YES

## 2019-03-04 DIAGNOSIS — F20.9 SCHIZOPHRENIA, UNSPECIFIED TYPE (H): ICD-10-CM

## 2019-03-04 DIAGNOSIS — F79 MENTAL RETARDATION: ICD-10-CM

## 2019-03-04 NOTE — TELEPHONE ENCOUNTER
Requested Prescriptions   Pending Prescriptions Disp Refills     clonazePAM (KLONOPIN) 0.5 MG tablet [Pharmacy Med Name: CLONAZEPAM 0.5 MG TABLET] 60 tablet      Sig: TAKE ONE TABLET BY MOUTH TWICE DAILY AT 8 AM AND 8 PM    There is no refill protocol information for this order        haloperidol (HALDOL) 10 MG tablet [Pharmacy Med Name: HALOPERIDOL 10 MG TABLET] 105 tablet 0     Sig: TAKE ONE TABLET BY MOUTH TWICE DAILY AT 8 AM AND 12 PM AND TAKE 1 AND 1/2 TABLETS BY MOUTH AT BEDTIME    There is no refill protocol information for this order        QUEtiapine (SEROQUEL) 300 MG tablet [Pharmacy Med Name: QUETIAPINE FUMARATE 300 MG TABLET] 30 tablet 0     Sig: TAKE ONE TABLET BY MOUTH EVERY DAY AT 12 PM    Antipsychotic Medications Failed - 3/4/2019  4:18 PM       Failed - Lipid panel on file within the past 12 months    Recent Labs   Lab Test 09/22/17  0821  02/25/15  0855   CHOL 175   < > 187   TRIG 218*   < > 122   HDL 45   < > 47   LDL 86   < > 116   NHDL 130*   < >  --    VLDL  --   --  24   CHOLHDLRATIO  --   --  4.0    < > = values in this interval not displayed.              Passed - Blood pressure under 140/90 in past 12 months    BP Readings from Last 3 Encounters:   11/15/18 114/84   07/02/18 135/75   05/25/18 128/76                Passed - Patient is 12 years of age or older       Passed - CBC on file in past 12 months    Recent Labs   Lab Test 11/15/18  1045   WBC 6.1   RBC 4.57   HGB 13.4   HCT 41.6                   Passed - Heart Rate on file within past 12 months    Pulse Readings from Last 3 Encounters:   11/15/18 58   07/02/18 58   05/25/18 101              Passed - A1c or Glucose on file in past 12 months    Recent Labs   Lab Test 11/15/18  1045 08/23/17  1433   GLC 85 89   A1C  --  4.7       Please review patients last 3 weights. If a weight gain of >10 lbs exists, you may refill the prescription once after instructing the patient to schedule an appointment within the next 30 days.    Wt  "Readings from Last 3 Encounters:   11/15/18 119.2 kg (262 lb 12.8 oz)   07/02/18 123.8 kg (273 lb)   05/25/18 126.2 kg (278 lb 4 oz)            Passed - Medication is active on med list       Passed - Recent (6 mo) or future (30 days) visit within the authorizing provider's specialty    Patient had office visit in the last 6 months or has a visit in the next 30 days with authorizing provider or within the authorizing provider's specialty.  See \"Patient Info\" tab in inbasket, or \"Choose Columns\" in Meds & Orders section of the refill encounter.            QUEtiapine (SEROQUEL) 400 MG tablet [Pharmacy Med Name: QUETIAPINE FUMARATE 400 MG TABLET] 60 tablet 0     Sig: TAKE ONE TABLET BY MOUTH TWICE DAILY    Antipsychotic Medications Failed - 3/4/2019  4:18 PM       Failed - Lipid panel on file within the past 12 months    Recent Labs   Lab Test 09/22/17  0821  02/25/15  0855   CHOL 175   < > 187   TRIG 218*   < > 122   HDL 45   < > 47   LDL 86   < > 116   NHDL 130*   < >  --    VLDL  --   --  24   CHOLHDLRATIO  --   --  4.0    < > = values in this interval not displayed.              Passed - Blood pressure under 140/90 in past 12 months    BP Readings from Last 3 Encounters:   11/15/18 114/84   07/02/18 135/75   05/25/18 128/76                Passed - Patient is 12 years of age or older       Passed - CBC on file in past 12 months    Recent Labs   Lab Test 11/15/18  1045   WBC 6.1   RBC 4.57   HGB 13.4   HCT 41.6                   Passed - Heart Rate on file within past 12 months    Pulse Readings from Last 3 Encounters:   11/15/18 58   07/02/18 58   05/25/18 101              Passed - A1c or Glucose on file in past 12 months    Recent Labs   Lab Test 11/15/18  1045 08/23/17  1433   GLC 85 89   A1C  --  4.7       Please review patients last 3 weights. If a weight gain of >10 lbs exists, you may refill the prescription once after instructing the patient to schedule an appointment within the next 30 days.    Wt " "Readings from Last 3 Encounters:   11/15/18 119.2 kg (262 lb 12.8 oz)   07/02/18 123.8 kg (273 lb)   05/25/18 126.2 kg (278 lb 4 oz)            Passed - Medication is active on med list       Passed - Recent (6 mo) or future (30 days) visit within the authorizing provider's specialty    Patient had office visit in the last 6 months or has a visit in the next 30 days with authorizing provider or within the authorizing provider's specialty.  See \"Patient Info\" tab in inbasket, or \"Choose Columns\" in Meds & Orders section of the refill encounter.            citalopram (CELEXA) 40 MG tablet [Pharmacy Med Name: CITALOPRAM HBR 40 MG TABLET] 30 tablet 0     Sig: TAKE ONE TABLET BY MOUTH EVERY DAY    SSRIs Protocol Passed - 3/4/2019  4:18 PM       Passed - Recent (12 mo) or future (30 days) visit within the authorizing provider's specialty    Patient had office visit in the last 12 months or has a visit in the next 30 days with authorizing provider or within the authorizing provider's specialty.  See \"Patient Info\" tab in inbasket, or \"Choose Columns\" in Meds & Orders section of the refill encounter.             Passed - Medication is active on med list       Passed - Patient is age 18 or older        divalproex sodium extended-release (DEPAKOTE ER) 500 MG 24 hr tablet [Pharmacy Med Name: DIVALPROEX SODIUM  MG TAB ER 24H] 90 tablet 0     Sig: TAKE THREE TABLETS BY MOUTH EVERY DAY    Anti-Seizure Meds Protocol  Failed - 3/4/2019  4:18 PM       Failed - Review Authorizing provider's last note.     Refer to last progress notes: confirm request is for original authorizing provider (cannot be through other providers).         Passed - Recent (12 mo) or future (30 days) visit within the authorizing provider's specialty    Patient had office visit in the last 12 months or has a visit in the next 30 days with authorizing provider or within the authorizing provider's specialty.  See \"Patient Info\" tab in inbasket, or \"Choose " "Columns\" in Meds & Orders section of the refill encounter.             Passed - Normal CBC on file in past 26 months    Recent Labs   Lab Test 11/15/18  1045   WBC 6.1   RBC 4.57   HGB 13.4   HCT 41.6                   Passed - Normal ALT or AST on file in past 26 months    Recent Labs   Lab Test 11/15/18  1045   ALT 22     Recent Labs   Lab Test 11/15/18  1045   AST 12            Passed - Normal platelet count on file in past 26 months    Recent Labs   Lab Test 11/15/18  1045                 Passed - Depakote level within therapeutic range in past 26 months    Lab Results   Component Value Date    ANGELLA 99 09/22/2017     Depakote level must be checked 2-4 weeks after dosage change.         Passed - Medication is active on med list      clonazePAM (KLONOPIN) 0.5 MG tablet  Last Written Prescription Date:  02/07/2019  Last Fill Quantity: 60 tablet,  # refills: 0   Last office visit: 11/15/2018 with prescribing provider:  SHOBHA Faria   Future Office Visit:      haloperidol (HALDOL) 10 MG tablet  Last Written Prescription Date:  02/07/2019  Last Fill Quantity: 105 tablet,  # refills: 0   Last office visit: 11/15/2018 with prescribing provider:  SHOBHA Faria   Future Office Visit:      QUEtiapine (SEROQUEL) 300 MG tablet  Last Written Prescription Date:  02/07/2019  Last Fill Quantity: 30 tablet,  # refills: 0   Last office visit: 11/15/2018 with prescribing provider:  SHOBHA Faria   Future Office Visit:      QUEtiapine (SEROQUEL) 400 MG tablet  Last Written Prescription Date:  02/07/2019  Last Fill Quantity: 60 tablet,  # refills: 0   Last office visit: 11/15/2018 with prescribing provider:  SHOBHA Faria   Future Office Visit:      citalopram (CELEXA) 40 MG tablet  Last Written Prescription Date:  02/05/2019  Last Fill Quantity: 30 tablet,  # refills: 0   Last office visit: 11/15/2018 with prescribing provider:  SHOBHA Faria   Future Office Visit:      divalproex sodium extended-release (DEPAKOTE ER) 500 MG 24 hr tablet  Last " Written Prescription Date:  02/05/2019  Last Fill Quantity: 90 tablet,  # refills: 0   Last office visit: 11/15/2018 with prescribing provider:  SHOBHA Faria   Future Office Visit:      Petty BARAJAS (R) (M)

## 2019-03-06 RX ORDER — HALOPERIDOL 10 MG/1
TABLET ORAL
Qty: 105 TABLET | Refills: 0 | Status: SHIPPED | OUTPATIENT
Start: 2019-03-06

## 2019-03-06 RX ORDER — DIVALPROEX SODIUM 500 MG/1
TABLET, EXTENDED RELEASE ORAL
Qty: 90 TABLET | Refills: 0 | Status: SHIPPED | OUTPATIENT
Start: 2019-03-06

## 2019-03-06 RX ORDER — CLONAZEPAM 0.5 MG/1
TABLET ORAL
Qty: 60 TABLET | Refills: 0 | Status: SHIPPED | OUTPATIENT
Start: 2019-03-06

## 2019-03-06 RX ORDER — CITALOPRAM HYDROBROMIDE 40 MG/1
TABLET ORAL
Qty: 30 TABLET | Refills: 0 | Status: SHIPPED | OUTPATIENT
Start: 2019-03-06

## 2019-03-06 RX ORDER — QUETIAPINE FUMARATE 400 MG/1
TABLET, FILM COATED ORAL
Qty: 60 TABLET | Refills: 0 | Status: SHIPPED | OUTPATIENT
Start: 2019-03-06

## 2019-03-06 RX ORDER — QUETIAPINE FUMARATE 300 MG/1
TABLET, FILM COATED ORAL
Qty: 30 TABLET | Refills: 0 | Status: SHIPPED | OUTPATIENT
Start: 2019-03-06

## 2019-04-08 ENCOUNTER — OFFICE VISIT (OUTPATIENT)
Dept: FAMILY MEDICINE | Facility: CLINIC | Age: 39
End: 2019-04-08
Payer: MEDICAID

## 2019-04-08 VITALS
HEART RATE: 84 BPM | TEMPERATURE: 98 F | HEIGHT: 71 IN | WEIGHT: 262 LBS | DIASTOLIC BLOOD PRESSURE: 88 MMHG | SYSTOLIC BLOOD PRESSURE: 118 MMHG | BODY MASS INDEX: 36.68 KG/M2 | RESPIRATION RATE: 18 BRPM

## 2019-04-08 DIAGNOSIS — F79 MENTAL RETARDATION: ICD-10-CM

## 2019-04-08 DIAGNOSIS — F20.9 SCHIZOPHRENIA, UNSPECIFIED TYPE (H): ICD-10-CM

## 2019-04-08 DIAGNOSIS — R03.0 ELEVATED BP WITHOUT DIAGNOSIS OF HYPERTENSION: Primary | ICD-10-CM

## 2019-04-08 PROCEDURE — 99213 OFFICE O/P EST LOW 20 MIN: CPT | Performed by: FAMILY MEDICINE

## 2019-04-08 RX ORDER — QUETIAPINE FUMARATE 400 MG/1
400 TABLET, FILM COATED ORAL 2 TIMES DAILY
Qty: 60 TABLET | Refills: 0 | Status: CANCELLED | OUTPATIENT
Start: 2019-04-08

## 2019-04-08 RX ORDER — CITALOPRAM HYDROBROMIDE 40 MG/1
40 TABLET ORAL DAILY
Qty: 30 TABLET | Refills: 0 | Status: CANCELLED | OUTPATIENT
Start: 2019-04-08

## 2019-04-08 RX ORDER — CLONAZEPAM 0.5 MG/1
TABLET ORAL
Qty: 60 TABLET | Refills: 0 | Status: CANCELLED | OUTPATIENT
Start: 2019-04-08

## 2019-04-08 RX ORDER — DIVALPROEX SODIUM 500 MG/1
TABLET, EXTENDED RELEASE ORAL
Qty: 90 TABLET | Refills: 0 | Status: CANCELLED | OUTPATIENT
Start: 2019-04-08

## 2019-04-08 RX ORDER — QUETIAPINE FUMARATE 300 MG/1
TABLET, FILM COATED ORAL
Qty: 30 TABLET | Refills: 0 | Status: CANCELLED | OUTPATIENT
Start: 2019-04-08

## 2019-04-08 ASSESSMENT — MIFFLIN-ST. JEOR: SCORE: 2134.51

## 2019-04-08 NOTE — PROGRESS NOTES
"  SUBJECTIVE:                                                    Brandt Wiley is a 39 year old male who presents to clinic today for the following health issues:      Hypertension Follow-up      Outpatient blood pressures are being checked at home.  Results are fluctuating since discontinuing the Atenolol. Some days in the 120's/130's some days having been higher readings up to 190/41.     Low Salt Diet: not monitoring salt          S: Brandt Wiley is a 39 year old male with elevated BP in past.  Group home.  Staff shows home numbers, checking daily, usually at goal.     Occasional elevation.     No cp or sob    Problem list, med list, additional histories reviewed and updated, as indicated.      O:/88 (BP Location: Right arm, Patient Position: Chair, Cuff Size: Adult Large)   Pulse 84   Temp 98  F (36.7  C) (Tympanic)   Resp 18   Ht 1.81 m (5' 11.25\")   Wt 118.8 kg (262 lb)   BMI 36.29 kg/m    GEN: Alert and oriented, in no acute distress  CV: RRR, no murmur  RESP: lungs clear bilaterally, good effort  EXT: no edema or lesions noted in lower extremities    A: htn, currently controlled with no meds    P: continue with weekly checks.  F/u if elevated.  No BP meds currently.         "

## 2019-04-08 NOTE — NURSING NOTE
"Chief Complaint   Patient presents with     Hypertension       Initial /88 (BP Location: Right arm, Patient Position: Chair, Cuff Size: Adult Large)   Pulse 84   Temp 98  F (36.7  C) (Tympanic)   Resp 18   Ht 1.81 m (5' 11.25\")   Wt 118.8 kg (262 lb)   BMI 36.29 kg/m   Estimated body mass index is 36.29 kg/m  as calculated from the following:    Height as of this encounter: 1.81 m (5' 11.25\").    Weight as of this encounter: 118.8 kg (262 lb).    Patient presents to the clinic using No DME    Health Maintenance that is potentially due pending provider review:  NONE    Nena Sinha MA  2:47 PM 4/8/2019  .      "

## 2019-04-16 ENCOUNTER — OFFICE VISIT (OUTPATIENT)
Dept: FAMILY MEDICINE | Facility: CLINIC | Age: 39
End: 2019-04-16
Payer: MEDICAID

## 2019-04-16 ENCOUNTER — ANCILLARY PROCEDURE (OUTPATIENT)
Dept: GENERAL RADIOLOGY | Facility: CLINIC | Age: 39
End: 2019-04-16
Attending: FAMILY MEDICINE
Payer: MEDICAID

## 2019-04-16 VITALS
TEMPERATURE: 97.8 F | SYSTOLIC BLOOD PRESSURE: 130 MMHG | BODY MASS INDEX: 36.54 KG/M2 | HEIGHT: 71 IN | WEIGHT: 261 LBS | DIASTOLIC BLOOD PRESSURE: 86 MMHG | RESPIRATION RATE: 18 BRPM | HEART RATE: 68 BPM

## 2019-04-16 DIAGNOSIS — M21.612 BUNION, LEFT: ICD-10-CM

## 2019-04-16 DIAGNOSIS — M21.612 BUNION, LEFT: Primary | ICD-10-CM

## 2019-04-16 PROCEDURE — 73630 X-RAY EXAM OF FOOT: CPT | Mod: LT

## 2019-04-16 PROCEDURE — 99213 OFFICE O/P EST LOW 20 MIN: CPT | Performed by: FAMILY MEDICINE

## 2019-04-16 ASSESSMENT — MIFFLIN-ST. JEOR: SCORE: 2118.08

## 2019-04-16 NOTE — PATIENT INSTRUCTIONS
Patient Education     Bunion    You have a bunion. This is a bony bump at the base of your big toe, along the inside edge of your foot. As the bump gets bigger, it can become red, swollen, and painful with shoe wear.  Bunions may occur if you wear shoes that are too tight and pinch your toes together. High heels may make this worse. In some cases a bunion is due to poor alignment of the foot and ankle. This puts extra weight on the instep of each foot.  Once a bunion forms, it changes the way weight is spread all across your foot. This causes the bunion to get worse over time. The big toe will bend more and more toward the other toes.  A minor bunion can be treated by:    Wearing properly fitting shoes    Using bunion pads    Wearing shoe inserts, called orthotics, to better align the foot and ankle  Physical therapy with ultrasound or whirlpool baths can ease pain, redness, and swelling. Severe cases may require surgery. If you don t treat what is causing the bunion, it may get larger and more painful.  Home care    Limit high heels. These shoes force your foot forward, crowding the toes together.    Wear comfortable shoes with a wide toe area. Or have your existing shoes stretched by a shoe repair shop.    Don't wear shoes that are tight, narrow, or pointed.    If you are flat-footed, using arch supports may help prevent further deformity. The best shoe inserts are the ones custom made by an orthotic profession, an orthotist, or other healthcare provider.    Put a bunion pad over the bunion to ease pressure of your shoe against the bunion. You can buy these pads at most pharmacies without a prescription    To reduce pain and swelling, apply an ice pack over the injured area for 15 to 20 minutes. Do this every 1 to 2 hours the first day. Keep using ice 3 to 4 times a day until the pain and swelling goes away.    To make an ice pack, put ice cubes in a sealed zip-lock plastic bag. Wrap the bag in a clean, thin  "towel or cloth. Never put ice or an ice pack directly on the skin.    You may use over-the-counter pain medicine to control pain, unless another medicine was prescribed. Talk with your provider before using these medicines if you have chronic liver or kidney disease, or ever had a stomach ulcer or gastrointestinal bleeding.  Follow-up care  Follow up with a healthcare provider, or as advised.  If X-rays were taken, you will be notified of any new findings that may affect your care.  When to seek medical care  Contact your healthcare provider if any of the following occur:    Increasing pain or redness around the base of the big toe    Painful ingrown toenail, with redness and swelling or pus around the nail  Date Last Reviewed: 5/1/2018 2000-2018 [employer]. [address] [place] [address]. All rights reserved. This information is not intended as a substitute for professional medical care. Always follow your healthcare professional's instructions.           Patient Education     Treating Bunions  Although a bunion won t go away, wearing shoes that fit properly will often relieve the pain. Padding and icing the bunion may also help. Bunions that remain painful may need surgery.     Heels: Heel height should be low. The back of the shoe should  your heel firmly so the shoe doesn't flop when you walk.       Toes: There should be 1/2\" between your longest toe and the tip of the shoe. The shoe should be wide enough for you to wiggle your toes.    Shoes  To relieve a bunion, you don t have to buy shoes that are ugly or out of fashion. But follow these tips:    Shop for shoes late in the day. This is when your feet are the largest.    Have both feet measured. Fit shoes to your larger foot.    Look for shoes that have the same shape as your foot but are slightly wider across the toes.    Choose low-heeled shoes.    Always try shoes on. Stand up and walk around. If the shoes aren t " comfortable, don t buy them.  Ice massage  To help relieve a painful bunion, put an ice cube in a plastic bag. Rub the ice on the bunion for 5 minutes. Repeat 2 to 3 times a day.  Pads  You may want to put a pad over the bunion to cushion it. You can [facility] bunion pads at [facility].  Surgery  Wearing wider shoes and padding the bunion may not relieve the pain. Your healthcare provider may then suggest surgery. During surgery, the bunion is shaved away and the bones are put back in a straight line.   Date Last Reviewed: 1/1/2018 2000-2018 [employer]. [address] [place] [address], [place] [address]. All rights reserved. This information is not intended as a substitute for professional medical care. Always follow your healthcare professional's instructions.

## 2019-04-16 NOTE — NURSING NOTE
"Chief Complaint   Patient presents with     Musculoskeletal Problem       Initial /86 (Cuff Size: Adult Regular)   Pulse 68   Temp 97.8  F (36.6  C) (Tympanic)   Resp 18   Ht 1.791 m (5' 10.5\")   Wt 118.4 kg (261 lb)   BMI 36.92 kg/m   Estimated body mass index is 36.92 kg/m  as calculated from the following:    Height as of this encounter: 1.791 m (5' 10.5\").    Weight as of this encounter: 118.4 kg (261 lb).    Patient presents to the clinic using No DME    Health Maintenance that is potentially due pending provider review:  NONE    n/a    Is there anyone who you would like to be able to receive your results? Not Applicable  If yes have patient fill out CASIMIRO    "

## 2019-04-16 NOTE — PROGRESS NOTES
SUBJECTIVE:   Brandt Wiley is a 39 year old male who presents to clinic today for the following   health issues:      Musculoskeletal problem/pain      Duration: 1 year    Description  Location: left foot    Intensity:  moderate    Accompanying signs and symptoms: Big toe joint is big. No pain.     History  Previous similar problem: no   Previous evaluation:  none    Precipitating or alleviating factors:  Trauma or overuse: YES- thinks he had some shoes that were too tight.   Aggravating factors include: none    Therapies tried and outcome: Has different shoes now           Additional history: as documented    Reviewed  and updated as needed this visit by clinical staff         Reviewed and updated as needed this visit by Provider         Patient Active Problem List   Diagnosis     Schizophrenia (H)     HTN (hypertension)     Mental retardation     Hypothyroidism     Hyperlipidemia LDL goal <160     Abnormal stress test     Exotropia of right eye     Morbid obesity due to excess calories (H)     Lithium use     History reviewed. No pertinent surgical history.    Social History     Tobacco Use     Smoking status: Never Smoker     Smokeless tobacco: Never Used   Substance Use Topics     Alcohol use: No     Family History   Problem Relation Age of Onset     Diabetes No family hx of          Current Outpatient Medications   Medication Sig Dispense Refill     citalopram (CELEXA) 40 MG tablet TAKE ONE TABLET BY MOUTH EVERY DAY 30 tablet 0     clonazePAM (KLONOPIN) 0.5 MG tablet TAKE ONE TABLET BY MOUTH TWICE DAILY AT 8 AM AND 8 PM 60 tablet 0     clonazePAM (KLONOPIN) 1 MG tablet TAKE ONE TABLET BY MOUTH EVERY DAY AS NEEDED AGITATION 20 tablet 0     DENTA 5000 PLUS 1.1 % CREA BRUSH TEETH WITH A PEA SIZED AMOUNT FOR 60 SECONDS TWICE DAILY. NOTHING BY MOUTH FOR 30 MINUTES FOLLOWING  3     divalproex sodium extended-release (DEPAKOTE ER) 500 MG 24 hr tablet TAKE THREE TABLETS BY MOUTH EVERY DAY 90 tablet 0      "haloperidol (HALDOL) 10 MG tablet TAKE ONE TABLET BY MOUTH TWICE DAILY AT 8 AM AND 12 PM AND TAKE 1 AND 1/2 TABLETS BY MOUTH AT BEDTIME 105 tablet 0     levothyroxine (SYNTHROID/LEVOTHROID) 50 MCG tablet Take 1 tablet (50 mcg) by mouth daily 90 tablet 3     lithium 300 MG capsule Take 2 capsules (600 mg) by mouth 2 times daily (with meals) 120 capsule 0     lithium 300 MG tablet 2 in AM, one in  tablet 3     NEW MED X-vitate 40%, apply thinly, severe dry feet       QUEtiapine (SEROQUEL) 300 MG tablet TAKE ONE TABLET BY MOUTH EVERY DAY AT 12 PM 30 tablet 0     QUEtiapine (SEROQUEL) 400 MG tablet TAKE ONE TABLET BY MOUTH TWICE DAILY 60 tablet 0     SM VITAMIN B-6 100 MG TABS TAKE ONE TABLET BY MOUTH EVERY DAY 30 tablet 6     trihexyphenidyl (ARTANE) 5 MG tablet TAKE ONE TABLET BY MOUTH TWICE DAILY 60 tablet 3     Allergies   Allergen Reactions     Caffeine      Chocolate      Recent Labs   Lab Test 11/15/18  1045 09/22/17  0821 08/23/17  1433 01/18/16  0840 02/25/15  0855   A1C  --   --  4.7 4.9 4.9   LDL  --  86 109* 98 116   HDL  --  45 44 45 47   TRIG  --  218* 227* 114 122   ALT 22 27 26 36 22   CR 1.00  --  0.79 0.81 0.76   GFRESTIMATED 84  --  >90 >90  Non  GFR Calc   >90  Non  GFR Calc     GFRESTBLACK >90  --  >90 >90   GFR Calc   >90   GFR Calc     POTASSIUM 4.7  --  4.1 4.2 4.2   TSH 1.76  --  1.39 1.84 1.06      BP Readings from Last 3 Encounters:   04/16/19 130/86   04/08/19 118/88   11/15/18 114/84    Wt Readings from Last 3 Encounters:   04/16/19 118.4 kg (261 lb)   04/08/19 118.8 kg (262 lb)   11/15/18 119.2 kg (262 lb 12.8 oz)                  Labs reviewed in EPIC    ROS:  Constitutional, HEENT, cardiovascular, pulmonary, gi and gu systems are negative, except as otherwise noted.    OBJECTIVE:     /86 (Cuff Size: Adult Regular)   Pulse 68   Temp 97.8  F (36.6  C) (Tympanic)   Resp 18   Ht 1.791 m (5' 10.5\")   Wt 118.4 kg " (261 lb)   BMI 36.92 kg/m    Body mass index is 36.92 kg/m .  GENERAL: alert and no distress  MS: Left great toe bunion, significant callus involving feet, pedal pulses 3+ bilaterally  NEURO: Grossly intact    XR FOOT LT G/E 3 VW   4/16/2019 9:55 AM      HISTORY: left great toe bunion; Bunion, left     COMPARISON: None.                                                                      IMPRESSION: Hallux valgus deformity. No definite acute fracture or  Dislocation.    ASSESSMENT/PLAN:       (M21.612) Bunion, left  (primary encounter diagnosis)  Comment: Left great toe swelling secondary to bunion.  Home care explained and suggested to wear shoes with good space.  Instructed to use regular moisturizer for callus.  Podiatry referral placed for further review and recommendations  Plan: XR Foot Left G/E 3 Views, PODIATRY/FOOT & ANKLE        SURGERY REFERRAL              Patient Instructions       Patient Education     Bunion    You have a bunion. This is a bony bump at the base of your big toe, along the inside edge of your foot. As the bump gets bigger, it can become red, swollen, and painful with shoe wear.  Bunions may occur if you wear shoes that are too tight and pinch your toes together. High heels may make this worse. In some cases a bunion is due to poor alignment of the foot and ankle. This puts extra weight on the instep of each foot.  Once a bunion forms, it changes the way weight is spread all across your foot. This causes the bunion to get worse over time. The big toe will bend more and more toward the other toes.  A minor bunion can be treated by:    Wearing properly fitting shoes    Using bunion pads    Wearing shoe inserts, called orthotics, to better align the foot and ankle  Physical therapy with ultrasound or whirlpool baths can ease pain, redness, and swelling. Severe cases may require surgery. If you don t treat what is causing the bunion, it may get larger and more painful.  Home care    Limit  high heels. These shoes force your foot forward, crowding the toes together.    Wear comfortable shoes with a wide toe area. Or have your existing shoes stretched by a shoe repair shop.    Don't wear shoes that are tight, narrow, or pointed.    If you are flat-footed, using arch supports may help prevent further deformity. The best shoe inserts are the ones custom made by an orthotic profession, an orthotist, or other healthcare provider.    Put a bunion pad over the bunion to ease pressure of your shoe against the bunion. You can buy these pads at most pharmacies without a prescription    To reduce pain and swelling, apply an ice pack over the injured area for 15 to 20 minutes. Do this every 1 to 2 hours the first day. Keep using ice 3 to 4 times a day until the pain and swelling goes away.    To make an ice pack, put ice cubes in a sealed zip-lock plastic bag. Wrap the bag in a clean, thin towel or cloth. Never put ice or an ice pack directly on the skin.    You may use over-the-counter pain medicine to control pain, unless another medicine was prescribed. Talk with your provider before using these medicines if you have chronic liver or kidney disease, or ever had a stomach ulcer or gastrointestinal bleeding.  Follow-up care  Follow up with a healthcare provider, or as advised.  If X-rays were taken, you will be notified of any new findings that may affect your care.  When to seek medical care  Contact your healthcare provider if any of the following occur:    Increasing pain or redness around the base of the big toe    Painful ingrown toenail, with redness and swelling or pus around the nail  Date Last Reviewed: 5/1/2018 2000-2018 [employer]. [address] [place] [id number]. All rights reserved. This information is not intended as a substitute for professional medical care. Always follow your healthcare professional's instructions.           Patient Education     Treating  "Bunions  Although a bunion won t go away, wearing shoes that fit properly will often relieve the pain. Padding and icing the bunion may also help. Bunions that remain painful may need surgery.     Heels: Heel height should be low. The back of the shoe should  your heel firmly so the shoe doesn't flop when you walk.       Toes: There should be 1/2\" between your longest toe and the tip of the shoe. The shoe should be wide enough for you to wiggle your toes.    Shoes  To relieve a bunion, you don t have to buy shoes that are ugly or out of fashion. But follow these tips:    Shop for shoes late in the day. This is when your feet are the largest.    Have both feet measured. Fit shoes to your larger foot.    Look for shoes that have the same shape as your foot but are slightly wider across the toes.    Choose low-heeled shoes.    Always try shoes on. Stand up and walk around. If the shoes aren t comfortable, don t buy them.  Ice massage  To help relieve a painful bunion, put an ice cube in a plastic bag. Rub the ice on the bunion for 5 minutes. Repeat 2 to 3 times a day.  Pads  You may want to put a pad over the bunion to cushion it. You can buy bunion pads at most drugstores.  Surgery  Wearing wider shoes and padding the bunion may not relieve the pain. Your healthcare provider may then suggest surgery. During surgery, the bunion is shaved away and the bones are put back in a straight line.   Date Last Reviewed: 1/1/2018 2000-2018 [employer] [address] [place] [address], [place] [address]. All rights reserved. This information is not intended as a substitute for professional medical care. Always follow your healthcare professional's instructions.               Ap Andres MD  [facility]      "

## 2019-04-24 ENCOUNTER — OFFICE VISIT (OUTPATIENT)
Dept: PODIATRY | Facility: CLINIC | Age: 39
End: 2019-04-24
Payer: MEDICAID

## 2019-04-24 ENCOUNTER — ANCILLARY PROCEDURE (OUTPATIENT)
Dept: GENERAL RADIOLOGY | Facility: CLINIC | Age: 39
End: 2019-04-24
Attending: PODIATRIST
Payer: MEDICAID

## 2019-04-24 VITALS
WEIGHT: 261 LBS | BODY MASS INDEX: 36.54 KG/M2 | HEIGHT: 71 IN | HEART RATE: 67 BPM | DIASTOLIC BLOOD PRESSURE: 94 MMHG | SYSTOLIC BLOOD PRESSURE: 127 MMHG

## 2019-04-24 DIAGNOSIS — M20.12 HALLUX VALGUS, LEFT: Primary | ICD-10-CM

## 2019-04-24 PROCEDURE — 99203 OFFICE O/P NEW LOW 30 MIN: CPT | Performed by: PODIATRIST

## 2019-04-24 PROCEDURE — 73630 X-RAY EXAM OF FOOT: CPT | Mod: LT

## 2019-04-24 ASSESSMENT — MIFFLIN-ST. JEOR: SCORE: 2118.08

## 2019-04-24 NOTE — PROGRESS NOTES
PATIENT HISTORY:  Brandt Wiley is a 39 year old male who presents to clinic with his caregiver for a bunion on the left foot.  The patient relates no pain around the big toe joint on the left foot.  The caregiver is concerned about bunion deformity.    REVIEW OF SYSTEMS:  Constitutional, HEENT, cardiovascular, pulmonary, GI, , musculoskeletal, neuro, skin, endocrine and psych systems are negative, except as otherwise noted.     PAST MEDICAL HISTORY:   Past Medical History:   Diagnosis Date     HTN (hypertension)      Hyperlipidemia      Hypothyroidism      Schizophrenia (H)         PAST SURGICAL HISTORY: No past surgical history on file.     MEDICATIONS:   Current Outpatient Medications:      citalopram (CELEXA) 40 MG tablet, TAKE ONE TABLET BY MOUTH EVERY DAY, Disp: 30 tablet, Rfl: 0     clonazePAM (KLONOPIN) 0.5 MG tablet, TAKE ONE TABLET BY MOUTH TWICE DAILY AT 8 AM AND 8 PM, Disp: 60 tablet, Rfl: 0     clonazePAM (KLONOPIN) 1 MG tablet, TAKE ONE TABLET BY MOUTH EVERY DAY AS NEEDED AGITATION, Disp: 20 tablet, Rfl: 0     DENTA 5000 PLUS 1.1 % CREA, BRUSH TEETH WITH A PEA SIZED AMOUNT FOR 60 SECONDS TWICE DAILY. NOTHING BY MOUTH FOR 30 MINUTES FOLLOWING, Disp: , Rfl: 3     divalproex sodium extended-release (DEPAKOTE ER) 500 MG 24 hr tablet, TAKE THREE TABLETS BY MOUTH EVERY DAY, Disp: 90 tablet, Rfl: 0     haloperidol (HALDOL) 10 MG tablet, TAKE ONE TABLET BY MOUTH TWICE DAILY AT 8 AM AND 12 PM AND TAKE 1 AND 1/2 TABLETS BY MOUTH AT BEDTIME, Disp: 105 tablet, Rfl: 0     levothyroxine (SYNTHROID/LEVOTHROID) 50 MCG tablet, Take 1 tablet (50 mcg) by mouth daily, Disp: 90 tablet, Rfl: 3     lithium 300 MG capsule, Take 2 capsules (600 mg) by mouth 2 times daily (with meals), Disp: 120 capsule, Rfl: 0     lithium 300 MG tablet, 2 in AM, one in PM, Disp: 270 tablet, Rfl: 3     NEW MED, X-vitate 40%, apply thinly, severe dry feet, Disp: , Rfl:      QUEtiapine (SEROQUEL) 300 MG tablet, TAKE ONE TABLET BY MOUTH EVERY  DAY AT 12 PM, Disp: 30 tablet, Rfl: 0     QUEtiapine (SEROQUEL) 400 MG tablet, TAKE ONE TABLET BY MOUTH TWICE DAILY, Disp: 60 tablet, Rfl: 0     SM VITAMIN B-6 100 MG TABS, TAKE ONE TABLET BY MOUTH EVERY DAY, Disp: 30 tablet, Rfl: 6     trihexyphenidyl (ARTANE) 5 MG tablet, TAKE ONE TABLET BY MOUTH TWICE DAILY, Disp: 60 tablet, Rfl: 3     ALLERGIES:    Allergies   Allergen Reactions     Caffeine      Chocolate      Chocolate Flavor Diarrhea        SOCIAL HISTORY:   Social History     Socioeconomic History     Marital status: Single     Spouse name: Not on file     Number of children: Not on file     Years of education: Not on file     Highest education level: Not on file   Occupational History     Not on file   Social Needs     Financial resource strain: Not on file     Food insecurity:     Worry: Not on file     Inability: Not on file     Transportation needs:     Medical: Not on file     Non-medical: Not on file   Tobacco Use     Smoking status: Never Smoker     Smokeless tobacco: Never Used   Substance and Sexual Activity     Alcohol use: No     Drug use: No     Sexual activity: Never   Lifestyle     Physical activity:     Days per week: Not on file     Minutes per session: Not on file     Stress: Not on file   Relationships     Social connections:     Talks on phone: Not on file     Gets together: Not on file     Attends [employer] service: Not on file     Active member of club or organization: Not on file     Attends meetings of clubs or organizations: Not on file     Relationship status: Not on file     Intimate partner violence:     Fear of current or ex partner: Not on file     Emotionally abused: Not on file     Physically abused: Not on file     Forced sexual activity: Not on file   Other Topics Concern     Parent/sibling w/ CABG, MI or angioplasty before 65F 55M? Not Asked   Social History Narrative     Not on file        FAMILY HISTORY:   Family History   Problem Relation Age of Onset     Diabetes No  "family hx of         EXAM:Vitals: BP (!) 127/94   Pulse 67   Ht 1.791 m (5' 10.5\")   Wt 118.4 kg (261 lb)   BMI 36.92 kg/m    BMI= Body mass index is 36.92 kg/m .    Weight management plan: Patient was referred to their PCP to discuss a diet and exercise plan.    General appearance: Patient is alert and fully cooperative with history & exam.  No sign of distress is noted during the visit.     Psychiatric: Affect is pleasant & appropriate.  Patient appears motivated to improve health.     Respiratory: Breathing is regular & unlabored while sitting.     HEENT: Hearing is intact to spoken word.  Speech is clear.  No gross evidence of visual impairment that would impact ambulation.     Dermatologic: Skin is intact to both lower extremities without significant lesions, rash or abrasion.  No paronychia or evidence of soft tissue infection is noted.     Vascular: DP & PT pulses are intact & regular bilaterally.  No significant edema or varicosities noted.  CFT and skin temperature is normal to both lower extremities.     Neurologic: Lower extremity sensation is intact to light touch.  No evidence of weakness or contracture in the lower extremities.  No evidence of neuropathy.     Musculoskeletal: Patient is ambulatory without assistive device or brace.  No gross ankle deformity noted.  No foot or ankle joint effusion is noted.   No pain on palpation of the first metatarsophalangeal joint of the left foot noted.  No surrounding erythema noted.  One notes a moderate bunion deformity on the left foot.    Radiographs from April 16 were evaluated including AP, lateral and medial oblique views of the left foot reveals a moderate bunion deformity with an elevated first intermetatarsal angle and hallux abductus angle.  No cortical erosions or periosteal elevation.  All joint margins appear stable.  There is no apparent fracture or tumor formation noted.  There is no evidence of foreign body.    ASSESSMENT / PLAN:     ICD-10-CM "    1. Hallux valgus, left M20.12 XR Foot Left G/E 3 Views       I have explained to Brandt  about the conditions.  We discussed the nature of the condition as well as the treatment plan and expected length of recovery.  At this time, I do not recommend surgery in absence of pain.  Patient was recommended to wear a roomier shoe to better offload any pressure is caused by the bunion deformity.      Disclaimer: This note consists of symbols derived from keyboarding, dictation and/or voice recognition software. As a result, there may be errors in the script that have gone undetected. Please consider this when interpreting information found in this chart.       MICHAEL Lindo D.P.M., F.CHAVO.C.F.A.S.

## 2019-04-24 NOTE — NURSING NOTE
"Chief Complaint   Patient presents with     Consult     XR today, bunion left foot        Initial BP (!) 127/94   Pulse 67   Ht 1.791 m (5' 10.5\")   Wt 118.4 kg (261 lb)   BMI 36.92 kg/m   Estimated body mass index is 36.92 kg/m  as calculated from the following:    Height as of this encounter: 1.791 m (5' 10.5\").    Weight as of this encounter: 118.4 kg (261 lb).  Medications and allergies reviewed.      Jolie CARROLL MA    "

## 2019-04-24 NOTE — LETTER
4/24/2019         RE: Brandt Wiley  [address] [facility] [address]        Dear Colleague,    Thank you for referring your patient, Brandt Wiley, to the [facility]. Please see a copy of my visit note below.    PATIENT HISTORY:  Brandt Wiley is a 39 year old male who presents to clinic with his caregiver for a bunion on the left foot.  The patient relates no pain around the big toe joint on the left foot.  The caregiver is concerned about bunion deformity.    REVIEW OF SYSTEMS:  Constitutional, HEENT, cardiovascular, pulmonary, GI, , musculoskeletal, neuro, skin, endocrine and psych systems are negative, except as otherwise noted.     PAST MEDICAL HISTORY:   Past Medical History:   Diagnosis Date     HTN (hypertension)      Hyperlipidemia      Hypothyroidism      Schizophrenia (H)         PAST SURGICAL HISTORY: No past surgical history on file.     MEDICATIONS:   Current Outpatient Medications:      citalopram (CELEXA) 40 MG tablet, TAKE ONE TABLET BY MOUTH EVERY DAY, Disp: 30 tablet, Rfl: 0     clonazePAM (KLONOPIN) 0.5 MG tablet, TAKE ONE TABLET BY MOUTH TWICE DAILY AT 8 AM AND 8 PM, Disp: 60 tablet, Rfl: 0     clonazePAM (KLONOPIN) 1 MG tablet, TAKE ONE TABLET BY MOUTH EVERY DAY AS NEEDED AGITATION, Disp: 20 tablet, Rfl: 0     DENTA 5000 PLUS 1.1 % CREA, BRUSH TEETH WITH A PEA SIZED AMOUNT FOR 60 SECONDS TWICE DAILY. NOTHING BY MOUTH FOR 30 MINUTES FOLLOWING, Disp: , Rfl: 3     divalproex sodium extended-release (DEPAKOTE ER) 500 MG 24 hr tablet, TAKE THREE TABLETS BY MOUTH EVERY DAY, Disp: 90 tablet, Rfl: 0     haloperidol (HALDOL) 10 MG tablet, TAKE ONE TABLET BY MOUTH TWICE DAILY AT 8 AM AND 12 PM AND TAKE 1 AND 1/2 TABLETS BY MOUTH AT BEDTIME, Disp: 105 tablet, Rfl: 0     levothyroxine (SYNTHROID/LEVOTHROID) 50 MCG tablet, Take 1 tablet (50 mcg) by mouth daily, Disp: 90 tablet, Rfl: 3     lithium 300 MG capsule, Take 2 capsules (600 mg) by mouth 2 times daily (with  meals), Disp: 120 capsule, Rfl: 0     lithium 300 MG tablet, 2 in AM, one in PM, Disp: 270 tablet, Rfl: 3     NEW MED, X-vitate 40%, apply thinly, severe dry feet, Disp: , Rfl:      QUEtiapine (SEROQUEL) 300 MG tablet, TAKE ONE TABLET BY MOUTH EVERY DAY AT 12 PM, Disp: 30 tablet, Rfl: 0     QUEtiapine (SEROQUEL) 400 MG tablet, TAKE ONE TABLET BY MOUTH TWICE DAILY, Disp: 60 tablet, Rfl: 0     SM VITAMIN B-6 100 MG TABS, TAKE ONE TABLET BY MOUTH EVERY DAY, Disp: 30 tablet, Rfl: 6     trihexyphenidyl (ARTANE) 5 MG tablet, TAKE ONE TABLET BY MOUTH TWICE DAILY, Disp: 60 tablet, Rfl: 3     ALLERGIES:    Allergies   Allergen Reactions     Caffeine      Chocolate      Chocolate Flavor Diarrhea        SOCIAL HISTORY:   Social History     Socioeconomic History     Marital status: Single     Spouse name: Not on file     Number of children: Not on file     Years of education: Not on file     Highest education level: Not on file   Occupational History     Not on file   Social Needs     Financial resource strain: Not on file     Food insecurity:     Worry: Not on file     Inability: Not on file     Transportation needs:     Medical: Not on file     Non-medical: Not on file   Tobacco Use     Smoking status: Never Smoker     Smokeless tobacco: Never Used   Substance and Sexual Activity     Alcohol use: No     Drug use: No     Sexual activity: Never   Lifestyle     Physical activity:     Days per week: Not on file     Minutes per session: Not on file     Stress: Not on file   Relationships     Social connections:     Talks on phone: Not on file     Gets together: Not on file     Attends Oriental orthodox service: Not on file     Active member of club or organization: Not on file     Attends meetings of clubs or organizations: Not on file     Relationship status: Not on file     Intimate partner violence:     Fear of current or ex partner: Not on file     Emotionally abused: Not on file     Physically abused: Not on file     Forced sexual  "activity: Not on file   Other Topics Concern     Parent/sibling w/ CABG, MI or angioplasty before 65F 55M? Not Asked   Social History Narrative     Not on file        FAMILY HISTORY:   Family History   Problem Relation Age of Onset     Diabetes No family hx of         EXAM:Vitals: BP (!) 127/94   Pulse 67   Ht 1.791 m (5' 10.5\")   Wt 118.4 kg (261 lb)   BMI 36.92 kg/m     BMI= Body mass index is 36.92 kg/m .    Weight management plan: Patient was referred to their PCP to discuss a diet and exercise plan.    General appearance: Patient is alert and fully cooperative with history & exam.  No sign of distress is noted during the visit.     Psychiatric: Affect is pleasant & appropriate.  Patient appears motivated to improve health.     Respiratory: Breathing is regular & unlabored while sitting.     HEENT: Hearing is intact to spoken word.  Speech is clear.  No gross evidence of visual impairment that would impact ambulation.     Dermatologic: Skin is intact to both lower extremities without significant lesions, rash or abrasion.  No paronychia or evidence of soft tissue infection is noted.     Vascular: DP & PT pulses are intact & regular bilaterally.  No significant edema or varicosities noted.  CFT and skin temperature is normal to both lower extremities.     Neurologic: Lower extremity sensation is intact to light touch.  No evidence of weakness or contracture in the lower extremities.  No evidence of neuropathy.     Musculoskeletal: Patient is ambulatory without assistive device or brace.  No gross ankle deformity noted.  No foot or ankle joint effusion is noted.   No pain on palpation of the first metatarsophalangeal joint of the left foot noted.  No surrounding erythema noted.  One notes a moderate bunion deformity on the left foot.    Radiographs from April 16 were evaluated including AP, lateral and medial oblique views of the left foot reveals a moderate bunion deformity with an elevated first " intermetatarsal angle and hallux abductus angle.  No cortical erosions or periosteal elevation.  All joint margins appear stable.  There is no apparent fracture or tumor formation noted.  There is no evidence of foreign body.    ASSESSMENT / PLAN:     ICD-10-CM    1. Hallux valgus, left M20.12 XR Foot Left G/E 3 Views       I have explained to Brandt  about the conditions.  We discussed the nature of the condition as well as the treatment plan and expected length of recovery.  At this time, I do not recommend surgery in absence of pain.  Patient was recommended to wear a roomier shoe to better offload any pressure is caused by the bunion deformity.      Disclaimer: This note consists of symbols derived from keyboarding, dictation and/or voice recognition software. As a result, there may be errors in the script that have gone undetected. Please consider this when interpreting information found in this chart.       MICHAEL Lindo D.P.M., F.A.C.F.A.S.        Again, thank you for allowing me to participate in the care of your patient.        Sincerely,        Abdulaziz Lindo DPM

## 2019-06-07 ENCOUNTER — ALLIED HEALTH/NURSE VISIT (OUTPATIENT)
Dept: FAMILY MEDICINE | Facility: CLINIC | Age: 39
End: 2019-06-07
Payer: MEDICAID

## 2019-06-07 VITALS — DIASTOLIC BLOOD PRESSURE: 70 MMHG | SYSTOLIC BLOOD PRESSURE: 113 MMHG | HEART RATE: 65 BPM

## 2019-06-07 DIAGNOSIS — I10 ESSENTIAL HYPERTENSION: Primary | ICD-10-CM

## 2019-06-07 PROCEDURE — 99207 ZZC NO CHARGE NURSE ONLY: CPT | Performed by: FAMILY MEDICINE

## 2019-10-22 ENCOUNTER — TELEPHONE (OUTPATIENT)
Dept: FAMILY MEDICINE | Facility: CLINIC | Age: 39
End: 2019-10-22

## 2019-10-22 DIAGNOSIS — E03.2 HYPOTHYROIDISM DUE TO MEDICATION: ICD-10-CM

## 2019-10-22 RX ORDER — LEVOTHYROXINE SODIUM 50 UG/1
TABLET ORAL
Qty: 30 TABLET | Refills: 3 | Status: SHIPPED | OUTPATIENT
Start: 2019-10-22 | End: 2020-02-04

## 2019-10-22 NOTE — TELEPHONE ENCOUNTER
Reason for Call:  Form, our goal is to have forms completed with 72 hours, however, some forms may require a visit or additional information.    Type of letter, form or note:  [employer]    Who is the form from?: Patient    Where did the form come from: form was faxed in    What clinic location was the form placed at?: [place]    Where the form was placed: Given to physician    What number is listed as a contact on the form?: [place] [facility] [place] [phone]       Additional comments: Received fax with physical examination form from [facility] [place]. Fax back as soon as complete.    Call taken on 10/22/2019 at 2:42 PM by Luzmaria Gomes

## 2019-10-24 NOTE — TELEPHONE ENCOUNTER
Dr. Andres has not seen this patient except for a bunion so he asks that patient either make an appointment or he have the provider from [facility] that saw him in February fill it out. Group home notified.    Luzmaria Gomes-Station

## 2019-10-28 ENCOUNTER — TELEPHONE (OUTPATIENT)
Dept: FAMILY MEDICINE | Facility: CLINIC | Age: 39
End: 2019-10-28

## 2019-10-28 NOTE — TELEPHONE ENCOUNTER
Form received back signed  10/28/19  Faxed Back & Sent to be scanned to this encounter  Sarah Orn Station Sec

## 2019-10-28 NOTE — TELEPHONE ENCOUNTER
[facility]- Physical Examination - Atheletes with Down Syndrome  Form placed on Provider Dr. Bienvenido santiago for Signature.

## 2020-01-09 ENCOUNTER — MEDICAL CORRESPONDENCE (OUTPATIENT)
Dept: HEALTH INFORMATION MANAGEMENT | Facility: CLINIC | Age: 40
End: 2020-01-09

## 2020-01-09 ENCOUNTER — OFFICE VISIT (OUTPATIENT)
Dept: FAMILY MEDICINE | Facility: CLINIC | Age: 40
End: 2020-01-09
Payer: MEDICAID

## 2020-01-09 VITALS
HEIGHT: 71 IN | SYSTOLIC BLOOD PRESSURE: 130 MMHG | WEIGHT: 270 LBS | DIASTOLIC BLOOD PRESSURE: 84 MMHG | HEART RATE: 64 BPM | BODY MASS INDEX: 37.8 KG/M2 | RESPIRATION RATE: 18 BRPM | TEMPERATURE: 98.2 F

## 2020-01-09 DIAGNOSIS — E66.01 MORBID OBESITY DUE TO EXCESS CALORIES (H): ICD-10-CM

## 2020-01-09 DIAGNOSIS — Z23 NEED FOR PROPHYLACTIC VACCINATION AND INOCULATION AGAINST INFLUENZA: ICD-10-CM

## 2020-01-09 DIAGNOSIS — F20.9 SCHIZOPHRENIA, UNSPECIFIED TYPE (H): ICD-10-CM

## 2020-01-09 DIAGNOSIS — E03.9 HYPOTHYROIDISM, UNSPECIFIED TYPE: ICD-10-CM

## 2020-01-09 DIAGNOSIS — Z00.00 ROUTINE GENERAL MEDICAL EXAMINATION AT A HEALTH CARE FACILITY: Primary | ICD-10-CM

## 2020-01-09 LAB
ALBUMIN SERPL-MCNC: 4.1 G/DL (ref 3.4–5)
ALP SERPL-CCNC: 65 U/L (ref 40–150)
ALT SERPL W P-5'-P-CCNC: 25 U/L (ref 0–70)
ANION GAP SERPL CALCULATED.3IONS-SCNC: 7 MMOL/L (ref 3–14)
AST SERPL W P-5'-P-CCNC: 13 U/L (ref 0–45)
BILIRUB SERPL-MCNC: 0.3 MG/DL (ref 0.2–1.3)
BUN SERPL-MCNC: 11 MG/DL (ref 7–30)
CALCIUM SERPL-MCNC: 9.1 MG/DL (ref 8.5–10.1)
CHLORIDE SERPL-SCNC: 103 MMOL/L (ref 94–109)
CHOLEST SERPL-MCNC: 184 MG/DL
CO2 SERPL-SCNC: 26 MMOL/L (ref 20–32)
CREAT SERPL-MCNC: 0.78 MG/DL (ref 0.66–1.25)
ERYTHROCYTE [DISTWIDTH] IN BLOOD BY AUTOMATED COUNT: 13.1 % (ref 10–15)
GFR SERPL CREATININE-BSD FRML MDRD: >90 ML/MIN/{1.73_M2}
GLUCOSE SERPL-MCNC: 80 MG/DL (ref 70–99)
HCT VFR BLD AUTO: 41.1 % (ref 40–53)
HDLC SERPL-MCNC: 54 MG/DL
HGB BLD-MCNC: 13.6 G/DL (ref 13.3–17.7)
LDLC SERPL CALC-MCNC: 107 MG/DL
MCH RBC QN AUTO: 29.4 PG (ref 26.5–33)
MCHC RBC AUTO-ENTMCNC: 33.1 G/DL (ref 31.5–36.5)
MCV RBC AUTO: 89 FL (ref 78–100)
NONHDLC SERPL-MCNC: 130 MG/DL
PLATELET # BLD AUTO: 195 10E9/L (ref 150–450)
POTASSIUM SERPL-SCNC: 3.9 MMOL/L (ref 3.4–5.3)
PROT SERPL-MCNC: 7.8 G/DL (ref 6.8–8.8)
RBC # BLD AUTO: 4.62 10E12/L (ref 4.4–5.9)
SODIUM SERPL-SCNC: 136 MMOL/L (ref 133–144)
TRIGL SERPL-MCNC: 113 MG/DL
TSH SERPL DL<=0.005 MIU/L-ACNC: 1.78 MU/L (ref 0.4–4)
WBC # BLD AUTO: 7.4 10E9/L (ref 4–11)

## 2020-01-09 PROCEDURE — 99213 OFFICE O/P EST LOW 20 MIN: CPT | Mod: 25 | Performed by: FAMILY MEDICINE

## 2020-01-09 PROCEDURE — 84443 ASSAY THYROID STIM HORMONE: CPT | Performed by: FAMILY MEDICINE

## 2020-01-09 PROCEDURE — 90686 IIV4 VACC NO PRSV 0.5 ML IM: CPT | Performed by: FAMILY MEDICINE

## 2020-01-09 PROCEDURE — 80053 COMPREHEN METABOLIC PANEL: CPT | Performed by: FAMILY MEDICINE

## 2020-01-09 PROCEDURE — 85027 COMPLETE CBC AUTOMATED: CPT | Performed by: FAMILY MEDICINE

## 2020-01-09 PROCEDURE — 90471 IMMUNIZATION ADMIN: CPT | Performed by: FAMILY MEDICINE

## 2020-01-09 PROCEDURE — 99395 PREV VISIT EST AGE 18-39: CPT | Mod: 25 | Performed by: FAMILY MEDICINE

## 2020-01-09 PROCEDURE — 36415 COLL VENOUS BLD VENIPUNCTURE: CPT | Performed by: FAMILY MEDICINE

## 2020-01-09 PROCEDURE — 80061 LIPID PANEL: CPT | Performed by: FAMILY MEDICINE

## 2020-01-09 ASSESSMENT — MIFFLIN-ST. JEOR: SCORE: 2158.9

## 2020-01-09 NOTE — LETTER
January 10, 2020      Brandt Wiley  [address] [facility] [id number]        Dear ,    We are writing to inform you of your test results.    Cholesterol level slightly elevated, continue healthy lifestyle modifications as discussed earlier.  Other lab results came back unremarkable.  Let us know if there are any questions.     Resulted Orders   Comprehensive metabolic panel (BMP + Alb, Alk Phos, ALT, AST, Total. Bili, TP)   Result Value Ref Range    Sodium 136 133 - 144 mmol/L    Potassium 3.9 3.4 - 5.3 mmol/L    Chloride 103 94 - 109 mmol/L    Carbon Dioxide 26 20 - 32 mmol/L    Anion Gap 7 3 - 14 mmol/L    Glucose 80 70 - 99 mg/dL      Comment:      Non Fasting    Urea Nitrogen 11 7 - 30 mg/dL    Creatinine 0.78 0.66 - 1.25 mg/dL    GFR Estimate >90 >60 mL/min/[1.73_m2]      Comment:      Non  GFR Calc  Starting 12/18/2018, serum creatinine based estimated GFR (eGFR) will be   calculated using the Chronic Kidney Disease Epidemiology Collaboration   (CKD-EPI) equation.      GFR Estimate If Black >90 >60 mL/min/[1.73_m2]      Comment:       GFR Calc  Starting 12/18/2018, serum creatinine based estimated GFR (eGFR) will be   calculated using the Chronic Kidney Disease Epidemiology Collaboration   (CKD-EPI) equation.      Calcium 9.1 8.5 - 10.1 mg/dL    Bilirubin Total 0.3 0.2 - 1.3 mg/dL    Albumin 4.1 3.4 - 5.0 g/dL    Protein Total 7.8 6.8 - 8.8 g/dL    Alkaline Phosphatase 65 40 - 150 U/L    ALT 25 0 - 70 U/L    AST 13 0 - 45 U/L   CBC with platelets   Result Value Ref Range    WBC 7.4 4.0 - 11.0 10e9/L    RBC Count 4.62 4.4 - 5.9 10e12/L    Hemoglobin 13.6 13.3 - 17.7 g/dL    Hematocrit 41.1 40.0 - 53.0 %    MCV 89 78 - 100 fl    MCH 29.4 26.5 - 33.0 pg    MCHC 33.1 31.5 - 36.5 g/dL    RDW 13.1 10.0 - 15.0 %    Platelet Count 195 150 - 450 10e9/L   Lipid panel   Result Value Ref Range    Cholesterol 184 <200 mg/dL    Triglycerides 113 <150 mg/dL      Comment:       Non Fasting    HDL Cholesterol 54 >39 mg/dL    LDL Cholesterol Calculated 107 (H) <100 mg/dL      Comment:      Above desirable:  100-129 mg/dl  Borderline High:  130-159 mg/dL  High:             160-189 mg/dL  Very high:       >189 mg/dl      Non HDL Cholesterol 130 (H) <130 mg/dL      Comment:      Above Desirable:  130-159 mg/dl  Borderline high:  160-189 mg/dl  High:             190-219 mg/dl  Very high:       >219 mg/dl     TSH   Result Value Ref Range    TSH 1.78 0.40 - 4.00 mU/L       If you have any questions or concerns, please call the clinic at the number listed above.       Sincerely,        Ap Andres MD/cb

## 2020-01-09 NOTE — PATIENT INSTRUCTIONS
Preventive Health Recommendations  Male Ages 26 - 39    Yearly exam:             See your health care provider every year in order to  o   Review health changes.   o   Discuss preventive care.    o   Review your medicines if your doctor has prescribed any.    You should be tested each year for STDs (sexually transmitted diseases), if you re at risk.     After age 35, talk to your provider about cholesterol testing. If you are at risk for heart disease, have your cholesterol tested at least every 5 years.     If you are at risk for diabetes, you should have a diabetes test (fasting glucose).  Shots: Get a flu shot each year. Get a tetanus shot every 10 years.     Nutrition:    Eat at least 5 servings of fruits and vegetables daily.     Eat whole-grain bread, whole-wheat pasta and brown rice instead of white grains and rice.     Get adequate Calcium and Vitamin D.     Lifestyle    Exercise for at least 150 minutes a week (30 minutes a day, 5 days a week). This will help you control your weight and prevent disease.     Limit alcohol to one drink per day.     No smoking.     Wear sunscreen to prevent skin cancer.     See your dentist every six months for an exam and cleaning.     Patient Education     Weight Management: Exercise and Activity    Studies show that people who exercise are the most likely to lose weight and keep it off. Exercise burns calories. It helps build muscle to make your body stronger. Make exercise an important part of your weight-management plan.  Make activity part of your day  You may not think you have the time to exercise. But you can work activity into your daily life--you just need to be committed. Take 10 minutes out of your lunch hour to take a walk. Walk to the newsstand to get your paper instead of having it delivered. Make it a habit to take the stairs instead of the elevator. Park in a far away parking spot instead of the closest. You ll be surprised at how fast these little changes  can make a difference.  Some people really cannot walk very far, and tire out quickly with exercise. Instead of becoming discouraged, resolve to do what you can do, and work to make that a regular frequent habit.   The benefits of exercise  Exercise offers many benefits including:     Exercise increases your metabolism (the speed at which your body burns calories).    Regular exercise can increase the amount of muscle in your body. Muscle burns calories faster than fat. The more muscle you have, the more calories you burn.    Exercise gives you energy and curbs your appetite.    Exercise decreases stress and helps you sleep better. Find out for yourself what time of day works best for you.  Make exercise fun  Exercise can be fun. Choose an activity you enjoy. You may even get a friend to do it with you:    Take a resistance-training or aerobics class    Join a team sport    Take a dance class    Walk the dog    Ride a bike  If you have health problems, be sure to ask your healthcare provider before you start an exercise program. Have a  help you develop a plan that s safe for you.   Date Last Reviewed: 4/1/2018 2000-2019 [employer]. [address] [place] [address], [place] [address]. All rights reserved. This information is not intended as a substitute for professional medical care. Always follow your healthcare professional's instructions.

## 2020-01-09 NOTE — PROGRESS NOTES
3  SUBJECTIVE:   CC: Brandt Wiley is an 39 year old male who presents for preventive health visit.     Healthy Habits:    Do you get at least three servings of calcium containing foods daily (dairy, green leafy vegetables, etc.)? yes    Amount of exercise or daily activities, outside of work: likes to walk. Lifts sofa's at work.     Problems taking medications regularly No    Medication side effects: No    Have you had an eye exam in the past two years? no    Do you see a dentist twice per year? yes    Do you have sleep apnea, excessive snoring or daytime drowsiness?no      PROBLEMS TO ADD ON...  None      Today's PHQ-2 Score:   PHQ-2 ( 1999 [employer]) 1/9/2020 11/15/2018   Q1: Little interest or pleasure in doing things 0 0   Q2: Feeling down, depressed or hopeless 0 0   PHQ-2 Score 0 0   Q1: Little interest or pleasure in doing things - Not at all   Q2: Feeling down, depressed or hopeless - Not at all   PHQ-2 Score - 0       Abuse: Current or Past(Physical, Sexual or Emotional)- No  Do you feel safe in your environment? Yes        Social History     Tobacco Use     Smoking status: Never Smoker     Smokeless tobacco: Never Used   Substance Use Topics     Alcohol use: No     If you drink alcohol do you typically have >3 drinks per day or >7 drinks per week? No                      Last PSA: No results found for: PSA    Reviewed orders with patient. Reviewed health maintenance and updated orders accordingly - Yes  Lab work is in process  Labs reviewed in EPIC  BP Readings from Last 3 Encounters:   01/09/20 130/84   06/07/19 113/70   04/24/19 (!) 127/94    Wt Readings from Last 3 Encounters:   01/09/20 122.5 kg (270 lb)   04/24/19 118.4 kg (261 lb)   04/16/19 118.4 kg (261 lb)                  Patient Active Problem List   Diagnosis     Schizophrenia (H)     HTN (hypertension)     Mental retardation     Hypothyroidism     Hyperlipidemia LDL goal <160     Abnormal stress test     Exotropia of right eye     Morbid  obesity due to excess calories (H)     Lithium use     History reviewed. No pertinent surgical history.    Social History     Tobacco Use     Smoking status: Never Smoker     Smokeless tobacco: Never Used   Substance Use Topics     Alcohol use: No     Family History   Problem Relation Age of Onset     Diabetes No family hx of          Current Outpatient Medications   Medication Sig Dispense Refill     citalopram (CELEXA) 40 MG tablet TAKE ONE TABLET BY MOUTH EVERY DAY 30 tablet 0     clonazePAM (KLONOPIN) 0.5 MG tablet TAKE ONE TABLET BY MOUTH TWICE DAILY AT 8 AM AND 8 PM 60 tablet 0     clonazePAM (KLONOPIN) 1 MG tablet TAKE ONE TABLET BY MOUTH EVERY DAY AS NEEDED AGITATION 20 tablet 0     DENTA 5000 PLUS 1.1 % CREA BRUSH TEETH WITH A PEA SIZED AMOUNT FOR 60 SECONDS TWICE DAILY. NOTHING BY MOUTH FOR 30 MINUTES FOLLOWING  3     divalproex sodium extended-release (DEPAKOTE ER) 500 MG 24 hr tablet TAKE THREE TABLETS BY MOUTH EVERY DAY 90 tablet 0     haloperidol (HALDOL) 10 MG tablet TAKE ONE TABLET BY MOUTH TWICE DAILY AT 8 AM AND 12 PM AND TAKE 1 AND 1/2 TABLETS BY MOUTH AT BEDTIME 105 tablet 0     levothyroxine (SYNTHROID/LEVOTHROID) 50 MCG tablet TAKE ONE TABLET BY MOUTH EVERY DAY 30 tablet 3     lithium 300 MG capsule Take 2 capsules (600 mg) by mouth 2 times daily (with meals) 120 capsule 0     lithium 300 MG tablet 2 in AM, one in  tablet 3     NEW MED X-vitate 40%, apply thinly, severe dry feet       QUEtiapine (SEROQUEL) 300 MG tablet TAKE ONE TABLET BY MOUTH EVERY DAY AT 12 PM 30 tablet 0     QUEtiapine (SEROQUEL) 400 MG tablet TAKE ONE TABLET BY MOUTH TWICE DAILY 60 tablet 0     SM VITAMIN B-6 100 MG TABS TAKE ONE TABLET BY MOUTH EVERY  tablet 3     trihexyphenidyl (ARTANE) 5 MG tablet TAKE ONE TABLET BY MOUTH TWICE DAILY 60 tablet 3     Allergies   Allergen Reactions     Caffeine      Chocolate      Chocolate Flavor Diarrhea     Recent Labs   Lab Test 11/15/18  1045 09/22/17  0899  "08/23/17  1433 01/18/16  0840 02/25/15  0855   A1C  --   --  4.7 4.9 4.9   LDL  --  86 109* 98 116   HDL  --  45 44 45 47   TRIG  --  218* 227* 114 122   ALT 22 27 26 36 22   CR 1.00  --  0.79 0.81 0.76   GFRESTIMATED 84  --  >90 >90  Non  GFR Calc   >90  Non  GFR Calc     GFRESTBLACK >90  --  >90 >90   GFR Calc   >90   GFR Calc     POTASSIUM 4.7  --  4.1 4.2 4.2   TSH 1.76  --  1.39 1.84 1.06        Reviewed and updated as needed this visit by clinical staff  Tobacco  Allergies  Meds  Med Hx  Surg Hx  Fam Hx  Soc Hx        Reviewed and updated as needed this visit by Provider            ROS:  CONSTITUTIONAL: NEGATIVE for fever, chills, change in weight  INTEGUMENTARY/SKIN: NEGATIVE for worrisome rashes, moles or lesions  EYES: NEGATIVE for vision changes or irritation  ENT: NEGATIVE for ear, mouth and throat problems  RESP: NEGATIVE for significant cough or SOB  CV: NEGATIVE for chest pain, palpitations or peripheral edema  GI: NEGATIVE for nausea, abdominal pain, heartburn, or change in bowel habits   male: negative for dysuria, hematuria, decreased urinary stream, erectile dysfunction, urethral discharge  MUSCULOSKELETAL: NEGATIVE for significant arthralgias or myalgia  NEURO: NEGATIVE for weakness, dizziness or paresthesias  PSYCHIATRIC: NEGATIVE for changes in mood or affect    OBJECTIVE:   /84 (Cuff Size: Adult Regular)   Pulse 64   Temp 98.2  F (36.8  C) (Tympanic)   Resp 18   Ht 1.791 m (5' 10.5\")   Wt 122.5 kg (270 lb)   BMI 38.19 kg/m    EXAM:  GENERAL: alert, no distress and obese  EYES: PERRL and EOMI  HENT: ear canals and TM's normal, nose and mouth without ulcers or lesions  NECK: no adenopathy, no asymmetry, masses, or scars and thyroid normal to palpation  RESP: lungs clear to auscultation - no rales, rhonchi or wheezes  CV: regular rate and rhythm, normal S1 S2, no S3 or S4, no murmur, click or rub, no peripheral " "edema and peripheral pulses strong  ABDOMEN: soft, nontender, no hepatosplenomegaly, no masses and bowel sounds normal  MS: no gross musculoskeletal defects noted, no edema  SKIN: no suspicious lesions or rashes  NEURO: Normal strength and tone, mentation intact and speech normal  PSYCH: Bright mood, well-groomed, good eye contact      ASSESSMENT/PLAN:     (Z00.00) Routine general medical examination at a health care facility  (primary encounter diagnosis)  Comment: Medically stable.  Influenza vaccine administered in office today.  Medications reviewed and no changes made  Plan: Comprehensive metabolic panel (BMP + Alb, Alk         Phos, ALT, AST, Total. Bili, TP), CBC with         platelets, Lipid panel            (E66.01) Morbid obesity due to excess calories (H)  Comment: Healthy lifestyle modifications stressed including regular exercise, balanced diet, weight loss and limiting salt/caffeine/pop intake      (F20.9) Schizophrenia, unspecified type (H)  Comment: Continue following psychiatry      (E03.9) Hypothyroidism, unspecified type  Comment: TSH ordered, will titrate levothyroxine dose accordingly  Plan: TSH           COUNSELING:  Reviewed preventive health counseling, as reflected in patient instructions    Estimated body mass index is 38.19 kg/m  as calculated from the following:    Height as of this encounter: 1.791 m (5' 10.5\").    Weight as of this encounter: 122.5 kg (270 lb).    Weight management plan: Discussed healthy diet and exercise guidelines     reports that he has never smoked. He has never used smokeless tobacco.      Counseling Resources:  ATP IV Guidelines  Pooled Cohorts Equation Calculator  FRAX Risk Assessment  ICSI Preventive Guidelines  Dietary Guidelines for Americans, 2010  USDA's MyPlate  ASA Prophylaxis  Lung CA Screening      Ap Andres MD  [facility]  "

## 2020-01-09 NOTE — NURSING NOTE
"Chief Complaint   Patient presents with     Physical     /84 (Cuff Size: Adult Regular)   Pulse 64   Temp 98.2  F (36.8  C) (Tympanic)   Resp 18   Ht 1.791 m (5' 10.5\")   Wt 122.5 kg (270 lb)   BMI 38.19 kg/m   Estimated body mass index is 38.19 kg/m  as calculated from the following:    Height as of this encounter: 1.791 m (5' 10.5\").    Weight as of this encounter: 122.5 kg (270 lb).  Patient presents to the clinic using No DME      Health Maintenance that is potentially due pending provider review:    Health Maintenance Due   Topic Date Due     HIV SCREENING  01/18/1996     INFLUENZA VACCINE (1) 09/01/2019                "

## 2020-02-04 DIAGNOSIS — E03.2 HYPOTHYROIDISM DUE TO MEDICATION: ICD-10-CM

## 2020-02-04 RX ORDER — LEVOTHYROXINE SODIUM 50 UG/1
TABLET ORAL
Qty: 90 TABLET | Refills: 3 | Status: SHIPPED | OUTPATIENT
Start: 2020-02-04 | End: 2020-12-30

## 2020-02-10 ENCOUNTER — OFFICE VISIT (OUTPATIENT)
Dept: FAMILY MEDICINE | Facility: CLINIC | Age: 40
End: 2020-02-10
Payer: MEDICAID

## 2020-02-10 VITALS
WEIGHT: 272 LBS | DIASTOLIC BLOOD PRESSURE: 82 MMHG | SYSTOLIC BLOOD PRESSURE: 128 MMHG | TEMPERATURE: 98.2 F | BODY MASS INDEX: 38.08 KG/M2 | HEIGHT: 71 IN | HEART RATE: 68 BPM | RESPIRATION RATE: 18 BRPM

## 2020-02-10 DIAGNOSIS — R22.0 LIP SWELLING: ICD-10-CM

## 2020-02-10 DIAGNOSIS — L01.00 IMPETIGO: Primary | ICD-10-CM

## 2020-02-10 PROCEDURE — 99214 OFFICE O/P EST MOD 30 MIN: CPT | Performed by: FAMILY MEDICINE

## 2020-02-10 RX ORDER — CEPHALEXIN 500 MG/1
500 CAPSULE ORAL 4 TIMES DAILY
Qty: 28 CAPSULE | Refills: 0 | Status: SHIPPED | OUTPATIENT
Start: 2020-02-10 | End: 2020-03-02

## 2020-02-10 RX ORDER — PREDNISONE 20 MG/1
40 TABLET ORAL DAILY
Qty: 14 TABLET | Refills: 0 | Status: SHIPPED | OUTPATIENT
Start: 2020-02-10 | End: 2020-03-02

## 2020-02-10 ASSESSMENT — MIFFLIN-ST. JEOR: SCORE: 2162.97

## 2020-02-10 NOTE — PROGRESS NOTES
SUBJECTIVE   Brandt Wiley is a 40 year old male who presents with     Swollen lip       Duration: Friday - 3 days     Description (location/character/radiation): Lower lip     Intensity:  moderate    Accompanying signs and symptoms: Woke up and it was swollen.     History (similar episodes/previous evaluation): None    Precipitating or alleviating factors: Has not ate or drank anything different.     Therapies tried and outcome: None       PCP   Ap Andres [phone]    Health Maintenance        Health Maintenance Due   Topic Date Due     HIV SCREENING  01/18/1996       HPI        Patient Active Problem List   Diagnosis     Schizophrenia (H)     HTN (hypertension)     Mental retardation     Hypothyroidism     Hyperlipidemia LDL goal <160     Abnormal stress test     Exotropia of right eye     Morbid obesity due to excess calories (H)     Lithium use     Current Outpatient Medications   Medication     citalopram (CELEXA) 40 MG tablet     clonazePAM (KLONOPIN) 0.5 MG tablet     clonazePAM (KLONOPIN) 1 MG tablet     DENTA 5000 PLUS 1.1 % CREA     divalproex sodium extended-release (DEPAKOTE ER) 500 MG 24 hr tablet     haloperidol (HALDOL) 10 MG tablet     levothyroxine (SYNTHROID/LEVOTHROID) 50 MCG tablet     lithium 300 MG capsule     lithium 300 MG tablet     NEW MED     QUEtiapine (SEROQUEL) 300 MG tablet     QUEtiapine (SEROQUEL) 400 MG tablet     SM VITAMIN B-6 100 MG TABS     trihexyphenidyl (ARTANE) 5 MG tablet     No current facility-administered medications for this visit.        Patient Active Problem List   Diagnosis     Schizophrenia (H)     HTN (hypertension)     Mental retardation     Hypothyroidism     Hyperlipidemia LDL goal <160     Abnormal stress test     Exotropia of right eye     Morbid obesity due to excess calories (H)     Lithium use     History reviewed. No pertinent surgical history.    Social History     Tobacco Use     Smoking status: Never Smoker     Smokeless tobacco: Never  Used   Substance Use Topics     Alcohol use: No     Family History   Problem Relation Age of Onset     Diabetes No family hx of          Current Outpatient Medications   Medication Sig Dispense Refill     citalopram (CELEXA) 40 MG tablet TAKE ONE TABLET BY MOUTH EVERY DAY 30 tablet 0     clonazePAM (KLONOPIN) 0.5 MG tablet TAKE ONE TABLET BY MOUTH TWICE DAILY AT 8 AM AND 8 PM 60 tablet 0     clonazePAM (KLONOPIN) 1 MG tablet TAKE ONE TABLET BY MOUTH EVERY DAY AS NEEDED AGITATION 20 tablet 0     DENTA 5000 PLUS 1.1 % CREA BRUSH TEETH WITH A PEA SIZED AMOUNT FOR 60 SECONDS TWICE DAILY. NOTHING BY MOUTH FOR 30 MINUTES FOLLOWING  3     divalproex sodium extended-release (DEPAKOTE ER) 500 MG 24 hr tablet TAKE THREE TABLETS BY MOUTH EVERY DAY 90 tablet 0     haloperidol (HALDOL) 10 MG tablet TAKE ONE TABLET BY MOUTH TWICE DAILY AT 8 AM AND 12 PM AND TAKE 1 AND 1/2 TABLETS BY MOUTH AT BEDTIME 105 tablet 0     levothyroxine (SYNTHROID/LEVOTHROID) 50 MCG tablet TAKE ONE TABLET BY MOUTH EVERY DAY 90 tablet 3     lithium 300 MG capsule Take 2 capsules (600 mg) by mouth 2 times daily (with meals) 120 capsule 0     lithium 300 MG tablet 2 in AM, one in  tablet 3     NEW MED X-vitate 40%, apply thinly, severe dry feet       QUEtiapine (SEROQUEL) 300 MG tablet TAKE ONE TABLET BY MOUTH EVERY DAY AT 12 PM 30 tablet 0     QUEtiapine (SEROQUEL) 400 MG tablet TAKE ONE TABLET BY MOUTH TWICE DAILY 60 tablet 0     SM VITAMIN B-6 100 MG TABS TAKE ONE TABLET BY MOUTH EVERY  tablet 3     trihexyphenidyl (ARTANE) 5 MG tablet TAKE ONE TABLET BY MOUTH TWICE DAILY 60 tablet 3     Allergies   Allergen Reactions     Caffeine      Chocolate      Chocolate Flavor Diarrhea     Recent Labs   Lab Test 01/09/20  1650 11/15/18  1045 09/22/17  0821 08/23/17  1433 01/18/16  0840 02/25/15  0855   A1C  --   --   --  4.7 4.9 4.9   *  --  86 109* 98 116   HDL 54  --  45 44 45 47   TRIG 113  --  218* 227* 114 122   ALT 25 22 27 26 36 22   CR  "0.78 1.00  --  0.79 0.81 0.76   GFRESTIMATED >90 84  --  >90 >90  Non  GFR Calc   >90  Non  GFR Calc     GFRESTBLACK >90 >90  --  >90 >90   GFR Calc   >90   GFR Calc     POTASSIUM 3.9 4.7  --  4.1 4.2 4.2   TSH 1.78 1.76  --  1.39 1.84 1.06      BP Readings from Last 3 Encounters:   02/10/20 128/82   01/09/20 130/84   06/07/19 113/70    Wt Readings from Last 3 Encounters:   02/10/20 123.4 kg (272 lb)   01/09/20 122.5 kg (270 lb)   04/24/19 118.4 kg (261 lb)                    Reviewed and updated:  Tobacco  Allergies  Meds  Med Hx  Surg Hx  Fam Hx  Soc Hx     ROS:  ROS: 10 point ROS neg other than the symptoms noted above in the HPI.    PHYSICAL EXAM   /82 (Cuff Size: Adult Regular)   Pulse 68   Temp 98.2  F (36.8  C) (Tympanic)   Resp 18   Ht 1.791 m (5' 10.5\")   Wt 123.4 kg (272 lb)   BMI 38.48 kg/m    Body mass index is 38.48 kg/m .  GENERAL: alert and no distress  HENT: normal cephalic/atraumatic, oral mucous membranes moist and left lower lip swollen, skin lesion just below lower lip with yellowish crust as shown below  NECK: no adenopathy, no asymmetry, masses, or scars and thyroid normal to palpation  RESP: lungs clear to auscultation - no rales, rhonchi or wheezes  CV: regular rates and rhythm, normal S1 S2, no S3 or S4 and no murmur, click or rub  ABDOMEN: soft, nontender  MS: no gross musculoskeletal defects noted, no edema  NEURO: Grossly intact            Assessment & Plan     (L01.00) Impetigo  (primary encounter diagnosis)  Comment: Suspect left lower lip swelling and skin lesion secondary to impetigo.  Cephalexin prescribed, common side effects discussed  Plan: cephALEXin (KEFLEX) 500 MG capsule            (R22.0) Lip swelling  Comment: Left lower lobe significantly swollen, prednisone prescribed, follow-up as needed  Plan: predniSONE (DELTASONE) 20 MG tablet             Ap Andres MD  [facility]" [place]

## 2020-02-10 NOTE — NURSING NOTE
"Chief Complaint   Patient presents with     Mouth/Lip Problem     /82 (Cuff Size: Adult Regular)   Pulse 68   Temp 98.2  F (36.8  C) (Tympanic)   Resp 18   Ht 1.791 m (5' 10.5\")   Wt 123.4 kg (272 lb)   BMI 38.48 kg/m   Estimated body mass index is 38.48 kg/m  as calculated from the following:    Height as of this encounter: 1.791 m (5' 10.5\").    Weight as of this encounter: 123.4 kg (272 lb).  Patient presents to the clinic using No DME      Health Maintenance that is potentially due pending provider review:    Health Maintenance Due   Topic Date Due     HIV SCREENING  01/18/1996                "

## 2020-03-02 ENCOUNTER — OFFICE VISIT (OUTPATIENT)
Dept: FAMILY MEDICINE | Facility: CLINIC | Age: 40
End: 2020-03-02
Payer: MEDICAID

## 2020-03-02 VITALS
RESPIRATION RATE: 18 BRPM | TEMPERATURE: 98.3 F | BODY MASS INDEX: 38.36 KG/M2 | HEIGHT: 71 IN | WEIGHT: 274 LBS | DIASTOLIC BLOOD PRESSURE: 80 MMHG | HEART RATE: 68 BPM | SYSTOLIC BLOOD PRESSURE: 128 MMHG

## 2020-03-02 DIAGNOSIS — L84 CALLUS OF FOOT: Primary | ICD-10-CM

## 2020-03-02 DIAGNOSIS — Z13.1 SCREENING FOR DIABETES MELLITUS: ICD-10-CM

## 2020-03-02 DIAGNOSIS — F20.9 SCHIZOPHRENIA, UNSPECIFIED TYPE (H): ICD-10-CM

## 2020-03-02 PROCEDURE — 99213 OFFICE O/P EST LOW 20 MIN: CPT | Performed by: FAMILY MEDICINE

## 2020-03-02 RX ORDER — VITAMIN E 268 MG
CAPSULE ORAL
Qty: 30 TABLET | Refills: 3 | Status: SHIPPED | OUTPATIENT
Start: 2020-03-02 | End: 2020-06-15

## 2020-03-02 ASSESSMENT — MIFFLIN-ST. JEOR: SCORE: 2179.99

## 2020-03-02 NOTE — PATIENT INSTRUCTIONS
Patient Education     Treating Corns and Calluses  If your corns or calluses are mild, reducing friction may help. Different shoes, moleskin patches, or soft pads may be all the treatment you need. In more severe cases, treating tissue buildup may require your doctor s care. Sometimes custom-made shoe inserts (orthotics) or special pads are prescribed to reduce friction and pressure.     Doctor examining senior man's foot.   Change shoes  If you have corns, your doctor may suggest wearing shoes that have more toe room. This way, buckled joints are less likely to be pinched against the top of the shoe. If you have calluses, wearing a cushioned insole, arch support, or heel counter can help reduce friction.  Visit your doctor  In some cases, your doctor may trim away the outer layers of skin that make up the corn or callus. For a painful corn, medicine may be injected beneath the built-up tissue.  Wear orthotics  Orthotics are specially made to meet the needs of your feet. They cushion calluses or divert pressure away from these problem areas. Worn as directed, orthotics help limit existing problems and prevent new ones from forming.  If you need surgery  If a bone or joint is out of place, certain parts of your foot may be under too much pressure. This can cause severe corns and calluses. In such cases, surgery may be the best way to correct the problem.  Outpatient procedures  In most cases, surgery to improve bone position is an outpatient procedure. Your doctor may cut away excess bone, reposition prominent bones, or even fuse joints. Sometimes tendons or ligaments are cut to reduce tension on a bone or joint. Your doctor will talk with you about the procedure that is best suited to your needs.  Date Last Reviewed: 7/1/2016 2000-2019 [employer]. [address] [place] [address], [place] [address]. All rights reserved. This information is not intended as a substitute for professional medical care. Always  follow your healthcare professional's instructions.

## 2020-03-02 NOTE — PROGRESS NOTES
Podiatry  SUBJECTIVE   Brandt Wiley is a 40 year old male who presents with     Foot issue       Duration: ongoing     Description (location/character/radiation): Both feet    Intensity:  moderate    Accompanying signs and symptoms: calluses on both feet    History (similar episodes/previous evaluation): been going on for quite awhile     Precipitating or alleviating factors: None    Therapies tried and outcome: lotion twice a day- otto       Wants to be checked for diabetes. Mom has diabetes- not sure on the type         PCP   Ap Andres [phone]    Health Maintenance        Health Maintenance Due   Topic Date Due     HIV SCREENING  01/18/1996       HPI        Patient Active Problem List   Diagnosis     Schizophrenia (H)     HTN (hypertension)     Mental retardation     Hypothyroidism     Hyperlipidemia LDL goal <160     Abnormal stress test     Exotropia of right eye     Morbid obesity due to excess calories (H)     Lithium use     Current Outpatient Medications   Medication     citalopram (CELEXA) 40 MG tablet     clonazePAM (KLONOPIN) 0.5 MG tablet     clonazePAM (KLONOPIN) 1 MG tablet     DENTA 5000 PLUS 1.1 % CREA     divalproex sodium extended-release (DEPAKOTE ER) 500 MG 24 hr tablet     haloperidol (HALDOL) 10 MG tablet     levothyroxine (SYNTHROID/LEVOTHROID) 50 MCG tablet     lithium 300 MG capsule     lithium 300 MG tablet     NEW MED     QUEtiapine (SEROQUEL) 300 MG tablet     QUEtiapine (SEROQUEL) 400 MG tablet     SM VITAMIN B-6 100 MG TABS     trihexyphenidyl (ARTANE) 5 MG tablet     No current facility-administered medications for this visit.        Patient Active Problem List   Diagnosis     Schizophrenia (H)     HTN (hypertension)     Mental retardation     Hypothyroidism     Hyperlipidemia LDL goal <160     Abnormal stress test     Exotropia of right eye     Morbid obesity due to excess calories (H)     Lithium use     No past surgical history on file.    Social History     Tobacco  Use     Smoking status: Never Smoker     Smokeless tobacco: Never Used   Substance Use Topics     Alcohol use: No     Family History   Problem Relation Age of Onset     Diabetes No family hx of          Current Outpatient Medications   Medication Sig Dispense Refill     citalopram (CELEXA) 40 MG tablet TAKE ONE TABLET BY MOUTH EVERY DAY 30 tablet 0     clonazePAM (KLONOPIN) 0.5 MG tablet TAKE ONE TABLET BY MOUTH TWICE DAILY AT 8 AM AND 8 PM 60 tablet 0     clonazePAM (KLONOPIN) 1 MG tablet TAKE ONE TABLET BY MOUTH EVERY DAY AS NEEDED AGITATION 20 tablet 0     DENTA 5000 PLUS 1.1 % CREA BRUSH TEETH WITH A PEA SIZED AMOUNT FOR 60 SECONDS TWICE DAILY. NOTHING BY MOUTH FOR 30 MINUTES FOLLOWING  3     divalproex sodium extended-release (DEPAKOTE ER) 500 MG 24 hr tablet TAKE THREE TABLETS BY MOUTH EVERY DAY 90 tablet 0     haloperidol (HALDOL) 10 MG tablet TAKE ONE TABLET BY MOUTH TWICE DAILY AT 8 AM AND 12 PM AND TAKE 1 AND 1/2 TABLETS BY MOUTH AT BEDTIME 105 tablet 0     levothyroxine (SYNTHROID/LEVOTHROID) 50 MCG tablet TAKE ONE TABLET BY MOUTH EVERY DAY 90 tablet 3     lithium 300 MG capsule Take 2 capsules (600 mg) by mouth 2 times daily (with meals) 120 capsule 0     lithium 300 MG tablet 2 in AM, one in  tablet 3     NEW MED X-vitate 40%, apply thinly, severe dry feet       QUEtiapine (SEROQUEL) 300 MG tablet TAKE ONE TABLET BY MOUTH EVERY DAY AT 12 PM 30 tablet 0     QUEtiapine (SEROQUEL) 400 MG tablet TAKE ONE TABLET BY MOUTH TWICE DAILY 60 tablet 0     SM VITAMIN B-6 100 MG TABS TAKE ONE TABLET BY MOUTH EVERY  tablet 3     trihexyphenidyl (ARTANE) 5 MG tablet TAKE ONE TABLET BY MOUTH TWICE DAILY 60 tablet 3     Allergies   Allergen Reactions     Caffeine      Chocolate      Chocolate Flavor Diarrhea     Recent Labs   Lab Test 01/09/20  1650 11/15/18  1045 09/22/17  0821 08/23/17  1433 01/18/16  0840 02/25/15  0855   A1C  --   --   --  4.7 4.9 4.9   *  --  86 109* 98 116   HDL 54  --  45 44 45  "47   TRIG 113  --  218* 227* 114 122   ALT 25 22 27 26 36 22   CR 0.78 1.00  --  0.79 0.81 0.76   GFRESTIMATED >90 84  --  >90 >90  Non  GFR Calc   >90  Non  GFR Calc     GFRESTBLACK >90 >90  --  >90 >90   GFR Calc   >90   GFR Calc     POTASSIUM 3.9 4.7  --  4.1 4.2 4.2   TSH 1.78 1.76  --  1.39 1.84 1.06      BP Readings from Last 3 Encounters:   03/02/20 128/80   02/10/20 128/82   01/09/20 130/84    Wt Readings from Last 3 Encounters:   03/02/20 124.3 kg (274 lb)   02/10/20 123.4 kg (272 lb)   01/09/20 122.5 kg (270 lb)                    Reviewed and updated:  Tobacco  Allergies  Meds  Med Hx  Surg Hx  Fam Hx  Soc Hx     ROS:  Constitutional, HEENT, cardiovascular, pulmonary, gi and gu systems are negative, except as otherwise noted.    PHYSICAL EXAM   /80 (Cuff Size: Adult Large)   Pulse 68   Temp 98.3  F (36.8  C) (Tympanic)   Resp 18   Ht 1.803 m (5' 11\")   Wt 124.3 kg (274 lb)   BMI 38.22 kg/m    Body mass index is 38.22 kg/m .  GENERAL: healthy, alert and no distress  NECK: no adenopathy, no asymmetry, masses, or scars and thyroid normal to palpation  RESP: lungs clear to auscultation - no rales, rhonchi or wheezes  CV: regular rate and rhythm, normal S1 S2, no S3 or S4, no murmur, click or rub, no peripheral edema and peripheral pulses strong  ABDOMEN: soft, nontender, no hepatosplenomegaly, no masses and bowel sounds normal  MS: extremities normal- no gross deformities noted, significant callus involving feet plantar surface bilaterally, pedal pulses 3+, sensation to touch and pressure intact    Assessment & Plan     (L84) Callus of foot  (primary encounter diagnosis)  Comment: Suggested to use petrolatum regularly along with moisturizers, balanced diet and well hydration.  Podiatry referral placed for further review and recommendations  Plan: PODIATRY/FOOT & ANKLE SURGERY REFERRAL      (Z13.1) Screening for diabetes " mellitus  Comment: Family history of type 2 diabetes mellitus, hemoglobin A1c ordered to screen for diabetes  Plan: Hemoglobin A1c, CANCELED: Hemoglobin A1c             Patient Instructions       Patient Education     Treating Corns and Calluses  If your corns or calluses are mild, reducing friction may help. Different shoes, moleskin patches, or soft pads may be all the treatment you need. In more severe cases, treating tissue buildup may require your doctor s care. Sometimes custom-made shoe inserts (orthotics) or special pads are prescribed to reduce friction and pressure.     Doctor examining senior man's foot.   Change shoes  If you have corns, your doctor may suggest wearing shoes that have more toe room. This way, buckled joints are less likely to be pinched against the top of the shoe. If you have calluses, wearing a cushioned insole, arch support, or heel counter can help reduce friction.  Visit your doctor  In some cases, your doctor may trim away the outer layers of skin that make up the corn or callus. For a painful corn, medicine may be injected beneath the built-up tissue.  Wear orthotics  Orthotics are specially made to meet the needs of your feet. They cushion calluses or divert pressure away from these problem areas. Worn as directed, orthotics help limit existing problems and prevent new ones from forming.  If you need surgery  If a bone or joint is out of place, certain parts of your foot may be under too much pressure. This can cause severe corns and calluses. In such cases, surgery may be the best way to correct the problem.  Outpatient procedures  In most cases, surgery to improve bone position is an outpatient procedure. Your doctor may cut away excess bone, reposition prominent bones, or even fuse joints. Sometimes tendons or ligaments are cut to reduce tension on a bone or joint. Your doctor will talk with you about the procedure that is best suited to your needs.  Date Last Reviewed:  7/1/2016 2000-2019 [employer]. [address] [place] [address], [place] [address]. All rights reserved. This information is not intended as a substitute for professional medical care. Always follow your healthcare professional's instructions.               Ap Andres MD  [facility]

## 2020-03-11 ENCOUNTER — OFFICE VISIT (OUTPATIENT)
Dept: PODIATRY | Facility: CLINIC | Age: 40
End: 2020-03-11
Payer: MEDICAID

## 2020-03-11 VITALS
HEART RATE: 95 BPM | BODY MASS INDEX: 38.08 KG/M2 | SYSTOLIC BLOOD PRESSURE: 134 MMHG | WEIGHT: 272 LBS | HEIGHT: 71 IN | DIASTOLIC BLOOD PRESSURE: 83 MMHG

## 2020-03-11 DIAGNOSIS — Z13.1 SCREENING FOR DIABETES MELLITUS: ICD-10-CM

## 2020-03-11 DIAGNOSIS — B35.3 TINEA PEDIS OF BOTH FEET: Primary | ICD-10-CM

## 2020-03-11 LAB — HBA1C MFR BLD: 4.5 % (ref 0–5.6)

## 2020-03-11 PROCEDURE — 99213 OFFICE O/P EST LOW 20 MIN: CPT | Performed by: PODIATRIST

## 2020-03-11 PROCEDURE — 36415 COLL VENOUS BLD VENIPUNCTURE: CPT | Performed by: FAMILY MEDICINE

## 2020-03-11 PROCEDURE — 83036 HEMOGLOBIN GLYCOSYLATED A1C: CPT | Performed by: FAMILY MEDICINE

## 2020-03-11 RX ORDER — PRENATAL VIT 91/IRON/FOLIC/DHA 28-975-200
COMBINATION PACKAGE (EA) ORAL 2 TIMES DAILY
Qty: 12 G | Refills: 1 | Status: SHIPPED | OUTPATIENT
Start: 2020-03-11 | End: 2020-03-26

## 2020-03-11 ASSESSMENT — MIFFLIN-ST. JEOR: SCORE: 2162.97

## 2020-03-11 NOTE — NURSING NOTE
"Chief Complaint   Patient presents with     Consult     Dry and cracked feet \"better in the last 2 weeks, lotion BID\"       Initial /83   Pulse 95   Ht 1.791 m (5' 10.5\")   Wt 123.4 kg (272 lb)   BMI 38.48 kg/m   Estimated body mass index is 38.48 kg/m  as calculated from the following:    Height as of this encounter: 1.791 m (5' 10.5\").    Weight as of this encounter: 123.4 kg (272 lb).  Medications and allergies reviewed.      Jolie CARRLOL MA    "

## 2020-03-11 NOTE — LETTER
3/11/2020         RE: Brandt Wiley  [address] [facility] [id number]        Dear Colleague,    Thank you for referring your patient, Brandt Wiley, to the [facility]. Please see a copy of my visit note below.    PATIENT HISTORY:  Brandt Wiley is a 40 year old male who presents to clinic for evaluation of dry cracked skin on both feet. The patient relates dealing with this condition for some time.      REVIEW OF SYSTEMS:  Constitutional, HEENT, cardiovascular, pulmonary, GI, , musculoskeletal, neuro, skin, endocrine and psych systems are negative, except as otherwise noted.     PAST MEDICAL HISTORY:   Past Medical History:   Diagnosis Date     HTN (hypertension)      Hyperlipidemia      Hypothyroidism      Schizophrenia (H)         PAST SURGICAL HISTORY: No past surgical history on file.     MEDICATIONS:   Current Outpatient Medications:      citalopram (CELEXA) 40 MG tablet, TAKE ONE TABLET BY MOUTH EVERY DAY, Disp: 30 tablet, Rfl: 0     clonazePAM (KLONOPIN) 0.5 MG tablet, TAKE ONE TABLET BY MOUTH TWICE DAILY AT 8 AM AND 8 PM, Disp: 60 tablet, Rfl: 0     clonazePAM (KLONOPIN) 1 MG tablet, TAKE ONE TABLET BY MOUTH EVERY DAY AS NEEDED AGITATION, Disp: 20 tablet, Rfl: 0     DENTA 5000 PLUS 1.1 % CREA, BRUSH TEETH WITH A PEA SIZED AMOUNT FOR 60 SECONDS TWICE DAILY. NOTHING BY MOUTH FOR 30 MINUTES FOLLOWING, Disp: , Rfl: 3     divalproex sodium extended-release (DEPAKOTE ER) 500 MG 24 hr tablet, TAKE THREE TABLETS BY MOUTH EVERY DAY, Disp: 90 tablet, Rfl: 0     haloperidol (HALDOL) 10 MG tablet, TAKE ONE TABLET BY MOUTH TWICE DAILY AT 8 AM AND 12 PM AND TAKE 1 AND 1/2 TABLETS BY MOUTH AT BEDTIME, Disp: 105 tablet, Rfl: 0     levothyroxine (SYNTHROID/LEVOTHROID) 50 MCG tablet, TAKE ONE TABLET BY MOUTH EVERY DAY, Disp: 90 tablet, Rfl: 3     lithium 300 MG capsule, Take 2 capsules (600 mg) by mouth 2 times daily (with meals), Disp: 120 capsule, Rfl: 0     lithium 300 MG tablet, 2 in  AM, one in PM, Disp: 270 tablet, Rfl: 3     NEW MED, X-vitate 40%, apply thinly, severe dry feet, Disp: , Rfl:      QUEtiapine (SEROQUEL) 300 MG tablet, TAKE ONE TABLET BY MOUTH EVERY DAY AT 12 PM, Disp: 30 tablet, Rfl: 0     QUEtiapine (SEROQUEL) 400 MG tablet, TAKE ONE TABLET BY MOUTH TWICE DAILY, Disp: 60 tablet, Rfl: 0     SM VITAMIN B-6 100 MG TABS, TAKE ONE TABLET BY MOUTH EVERY DAY, Disp: 30 tablet, Rfl: 3     trihexyphenidyl (ARTANE) 5 MG tablet, TAKE ONE TABLET BY MOUTH TWICE DAILY, Disp: 60 tablet, Rfl: 3     ALLERGIES:    Allergies   Allergen Reactions     Caffeine      Chocolate      Chocolate Flavor Diarrhea        SOCIAL HISTORY:   Social History     Socioeconomic History     Marital status: Single     Spouse name: Not on file     Number of children: Not on file     Years of education: Not on file     Highest education level: Not on file   Occupational History     Not on file   Social Needs     Financial resource strain: Not on file     Food insecurity     Worry: Not on file     Inability: Not on file     Transportation needs     Medical: Not on file     Non-medical: Not on file   Tobacco Use     Smoking status: Never Smoker     Smokeless tobacco: Never Used   Substance and Sexual Activity     Alcohol use: No     Drug use: No     Sexual activity: Never   Lifestyle     Physical activity     Days per week: Not on file     Minutes per session: Not on file     Stress: Not on file   Relationships     Social connections     Talks on phone: Not on file     Gets together: Not on file     Attends Denominational service: Not on file     Active member of club or organization: Not on file     Attends meetings of clubs or organizations: Not on file     Relationship status: Not on file     Intimate partner violence     Fear of current or ex partner: Not on file     Emotionally abused: Not on file     Physically abused: Not on file     Forced sexual activity: Not on file   Other Topics Concern     Parent/sibling w/ CABG,  "MI or angioplasty before 65F 55M? Not Asked   Social History Narrative     Not on file        FAMILY HISTORY:   Family History   Problem Relation Age of Onset     Diabetes No family hx of         EXAM:Vitals: /83   Pulse 95   Ht 1.791 m (5' 10.5\")   Wt 123.4 kg (272 lb)   BMI 38.48 kg/m    BMI= Body mass index is 38.48 kg/m .    Weight management plan: Patient was referred to their PCP to discuss a diet and exercise plan.    General appearance: Patient is alert and fully cooperative with history & exam.  No sign of distress is noted during the visit.     Psychiatric: Affect is pleasant & appropriate.  Patient appears motivated to improve health.     Respiratory: Breathing is regular & unlabored while sitting.     HEENT: Hearing is intact to spoken word.  Speech is clear.  No gross evidence of visual impairment that would impact ambulation.     Dermatologic: Skin is intact to right and left lower extremities without significant lesions, rash or abrasion.  No paronychia or evidence of soft tissue infection is noted.  One notes erythematous xerotic skin on the soles of both feet.     Vascular: DP & PT pulses are intact & regular on the right and left.  No significant edema or varicosities noted.  CFT and skin temperature is normal to the right and left lower extremities.     Neurologic: Lower extremity sensation is intact to light touch.  No evidence of weakness or contracture in the right and left lower extremities.  No evidence of neuropathy.     Musculoskeletal: Patient is ambulatory without assistive device or brace.  No gross ankle deformity noted.  No foot or ankle joint effusion is noted.         ASSESSMENT / PLAN:     ICD-10-CM    1. Tinea pedis of both feet  B35.3        I have explained to Brandt  about the conditions.  We discussed the nature of the condition as well as the treatment plan and expected length of recovery.  At this time, the patient was prescribed topical Terbinafine cream 1% to be " applied to the affected skin twice daily for two weeks.    Brandt verbalized agreement with and understanding of the rational for the diagnosis and treatment plan.  All questions were answered to best of my ability and the patient's satisfaction. The patient was advised to contact the clinic with any questions that may arise after the clinic visit.      Disclaimer: This note consists of symbols derived from keyboarding, dictation and/or voice recognition software. As a result, there may be errors in the script that have gone undetected. Please consider this when interpreting information found in this chart.       MICHAEL Lindo D.P.M., F.A.C.F.A.S.        Again, thank you for allowing me to participate in the care of your patient.        Sincerely,        Abdulaziz Lindo DPM

## 2020-03-11 NOTE — PROGRESS NOTES
PATIENT HISTORY:  Brandt Wiley is a 40 year old male who presents to clinic for evaluation of dry cracked skin on both feet. The patient relates dealing with this condition for some time.      REVIEW OF SYSTEMS:  Constitutional, HEENT, cardiovascular, pulmonary, GI, , musculoskeletal, neuro, skin, endocrine and psych systems are negative, except as otherwise noted.     PAST MEDICAL HISTORY:   Past Medical History:   Diagnosis Date     HTN (hypertension)      Hyperlipidemia      Hypothyroidism      Schizophrenia (H)         PAST SURGICAL HISTORY: No past surgical history on file.     MEDICATIONS:   Current Outpatient Medications:      citalopram (CELEXA) 40 MG tablet, TAKE ONE TABLET BY MOUTH EVERY DAY, Disp: 30 tablet, Rfl: 0     clonazePAM (KLONOPIN) 0.5 MG tablet, TAKE ONE TABLET BY MOUTH TWICE DAILY AT 8 AM AND 8 PM, Disp: 60 tablet, Rfl: 0     clonazePAM (KLONOPIN) 1 MG tablet, TAKE ONE TABLET BY MOUTH EVERY DAY AS NEEDED AGITATION, Disp: 20 tablet, Rfl: 0     DENTA 5000 PLUS 1.1 % CREA, BRUSH TEETH WITH A PEA SIZED AMOUNT FOR 60 SECONDS TWICE DAILY. NOTHING BY MOUTH FOR 30 MINUTES FOLLOWING, Disp: , Rfl: 3     divalproex sodium extended-release (DEPAKOTE ER) 500 MG 24 hr tablet, TAKE THREE TABLETS BY MOUTH EVERY DAY, Disp: 90 tablet, Rfl: 0     haloperidol (HALDOL) 10 MG tablet, TAKE ONE TABLET BY MOUTH TWICE DAILY AT 8 AM AND 12 PM AND TAKE 1 AND 1/2 TABLETS BY MOUTH AT BEDTIME, Disp: 105 tablet, Rfl: 0     levothyroxine (SYNTHROID/LEVOTHROID) 50 MCG tablet, TAKE ONE TABLET BY MOUTH EVERY DAY, Disp: 90 tablet, Rfl: 3     lithium 300 MG capsule, Take 2 capsules (600 mg) by mouth 2 times daily (with meals), Disp: 120 capsule, Rfl: 0     lithium 300 MG tablet, 2 in AM, one in PM, Disp: 270 tablet, Rfl: 3     NEW MED, X-vitate 40%, apply thinly, severe dry feet, Disp: , Rfl:      QUEtiapine (SEROQUEL) 300 MG tablet, TAKE ONE TABLET BY MOUTH EVERY DAY AT 12 PM, Disp: 30 tablet, Rfl: 0     QUEtiapine (SEROQUEL)  400 MG tablet, TAKE ONE TABLET BY MOUTH TWICE DAILY, Disp: 60 tablet, Rfl: 0     SM VITAMIN B-6 100 MG TABS, TAKE ONE TABLET BY MOUTH EVERY DAY, Disp: 30 tablet, Rfl: 3     trihexyphenidyl (ARTANE) 5 MG tablet, TAKE ONE TABLET BY MOUTH TWICE DAILY, Disp: 60 tablet, Rfl: 3     ALLERGIES:    Allergies   Allergen Reactions     Caffeine      Chocolate      Chocolate Flavor Diarrhea        SOCIAL HISTORY:   Social History     Socioeconomic History     Marital status: Single     Spouse name: Not on file     Number of children: Not on file     Years of education: Not on file     Highest education level: Not on file   Occupational History     Not on file   Social Needs     Financial resource strain: Not on file     Food insecurity     Worry: Not on file     Inability: Not on file     Transportation needs     Medical: Not on file     Non-medical: Not on file   Tobacco Use     Smoking status: Never Smoker     Smokeless tobacco: Never Used   Substance and Sexual Activity     Alcohol use: No     Drug use: No     Sexual activity: Never   Lifestyle     Physical activity     Days per week: Not on file     Minutes per session: Not on file     Stress: Not on file   Relationships     Social connections     Talks on phone: Not on file     Gets together: Not on file     Attends Bahai service: Not on file     Active member of club or organization: Not on file     Attends meetings of clubs or organizations: Not on file     Relationship status: Not on file     Intimate partner violence     Fear of current or ex partner: Not on file     Emotionally abused: Not on file     Physically abused: Not on file     Forced sexual activity: Not on file   Other Topics Concern     Parent/sibling w/ CABG, MI or angioplasty before 65F 55M? Not Asked   Social History Narrative     Not on file        FAMILY HISTORY:   Family History   Problem Relation Age of Onset     Diabetes No family hx of         EXAM:Vitals: /83   Pulse 95   Ht 1.791 m (5'  "10.5\")   Wt 123.4 kg (272 lb)   BMI 38.48 kg/m    BMI= Body mass index is 38.48 kg/m .    Weight management plan: Patient was referred to their PCP to discuss a diet and exercise plan.    General appearance: Patient is alert and fully cooperative with history & exam.  No sign of distress is noted during the visit.     Psychiatric: Affect is pleasant & appropriate.  Patient appears motivated to improve health.     Respiratory: Breathing is regular & unlabored while sitting.     HEENT: Hearing is intact to spoken word.  Speech is clear.  No gross evidence of visual impairment that would impact ambulation.     Dermatologic: Skin is intact to right and left lower extremities without significant lesions, rash or abrasion.  No paronychia or evidence of soft tissue infection is noted.  One notes erythematous xerotic skin on the soles of both feet.     Vascular: DP & PT pulses are intact & regular on the right and left.  No significant edema or varicosities noted.  CFT and skin temperature is normal to the right and left lower extremities.     Neurologic: Lower extremity sensation is intact to light touch.  No evidence of weakness or contracture in the right and left lower extremities.  No evidence of neuropathy.     Musculoskeletal: Patient is ambulatory without assistive device or brace.  No gross ankle deformity noted.  No foot or ankle joint effusion is noted.         ASSESSMENT / PLAN:     ICD-10-CM    1. Tinea pedis of both feet  B35.3        I have explained to Brandt  about the conditions.  We discussed the nature of the condition as well as the treatment plan and expected length of recovery.  At this time, the patient was prescribed topical Terbinafine cream 1% to be applied to the affected skin twice daily for two weeks.    Brandt verbalized agreement with and understanding of the rational for the diagnosis and treatment plan.  All questions were answered to best of my ability and the patient's satisfaction. The " patient was advised to contact the clinic with any questions that may arise after the clinic visit.      Disclaimer: This note consists of symbols derived from keyboarding, dictation and/or voice recognition software. As a result, there may be errors in the script that have gone undetected. Please consider this when interpreting information found in this chart.       MICHAEL Lindo D.P.M., [email or web address].

## 2020-03-11 NOTE — PATIENT INSTRUCTIONS
ATHLETE'S FOOT (TINEA PEDIS)  It is a rash that is caused by a fungus (most commonly Dermatophytes) in the outer layer of skin on the foot or in the nails. It can occur between the toes or on the instep and heel areas of the feet. Fungus will grow in warm and moist environments. The fungus that causes athlete's foot is contagious and you can get it from touching someone who has it of walking around bare foot around swimming pools, gyms, saunas, communal baths and showers, fitness centers or locker rooms.     SYMPTOMS  The symptoms of athlete's foot are numerous and include; itching, burning or stinging between the toes or on the soles of the feet, blisters, cracked and peeling skin, excessive dryness or excessive scaling on the bottom or sides of the feet, redness and softness with breaking down of the skin, especially between the toes, foot odor and thick, crumbly nails. Older males or people who live in warm humid environments are more prone to get it but it can develop at any age.    TREATMENT OPTIONS  Typically it can be treated with antifungal creams, lotions, ointments, sprays, or gels.  Many are available over the counter, some are prescription. It is important that you use them long enough, typically 4 weeks, to clear the rash. If an infection is particularly bad, your provider may prescribe oral medication. It is important that you follow the directions for any medication carefully to prevent recurrence of the rash.    HOME TREATMENT  1.  Keep feet clean and dry  2.  Dry between your toes any time your feet become wet  3.  Wear socks that are made of cotton, wool, or fibers designed to wick moisture away  from skin. Nylon, lycra, and spandex tend to trap moisture.  4.  If you have blistering, you may soak your feet in Burow's solution (aluminum acetate is active ingredient. Domeboro is a brand sold at [employer]). This is available over the counter.  5.  Treat shows with antifungal powder  6.  Tea Tree oil  may help reduce symptoms but does not cure the rash.    PREVENTION  1.  Change socks or stockings regularly.  2.  Use talcum powder on your feet if they sweat a lot.  3.  Do not borrow shoes.  4.  Let shoes dry out for 24 hours before wearing them again.  5.  Treat shoes with fungal powder  6.  Wear shoes that are well ventilated such as leather shoes or sandals.  Avoid shoes  made of synthetic materials such as vinyl or rubber.  7.  Wear water proof sandals when you are in public areas such as locker rooms, pools, communal baths, showers, saunas, and fitness centers.    FYI: Call or seek medical attention IMMEDIATELY if:  - You develop a fever over 100.4F for more than 24 hrs.  - There is a discharge of pus from infected areas.  - Red streaks develop from the affected areas  - Increase in redness, warmth, pain, swelling, or tenderness in the affected areas.

## 2020-03-26 DIAGNOSIS — B35.3 TINEA PEDIS OF BOTH FEET: ICD-10-CM

## 2020-03-26 RX ORDER — PRENATAL VIT 91/IRON/FOLIC/DHA 28-975-200
COMBINATION PACKAGE (EA) ORAL 2 TIMES DAILY
Qty: 12 G | Refills: 1 | Status: SHIPPED | OUTPATIENT
Start: 2020-03-26

## 2020-03-26 NOTE — TELEPHONE ENCOUNTER
Requested Prescriptions   Pending Prescriptions Disp Refills     terbinafine (LAMISIL AT) 1 % external cream 12 g 1     Sig: Apply topically 2 times daily Plastic occlusion at night       There is no refill protocol information for this order        Last office visit: 3/11/2020 with prescribing provider:  Dr. Lindo   Future Office Visit:      Denise Behrendt  Specialty CSS

## 2020-04-22 ENCOUNTER — VIRTUAL VISIT (OUTPATIENT)
Dept: PODIATRY | Facility: CLINIC | Age: 40
End: 2020-04-22
Payer: MEDICAID

## 2020-04-22 DIAGNOSIS — R23.4 FISSURE IN SKIN OF FOOT: ICD-10-CM

## 2020-04-22 DIAGNOSIS — L85.3 XEROSIS OF SKIN: Primary | ICD-10-CM

## 2020-04-22 PROCEDURE — 99213 OFFICE O/P EST LOW 20 MIN: CPT | Mod: 95 | Performed by: PODIATRIST

## 2020-04-22 RX ORDER — AMMONIUM LACTATE 12 G/100G
CREAM TOPICAL 2 TIMES DAILY PRN
Qty: 385 G | Refills: 3 | Status: SHIPPED | OUTPATIENT
Start: 2020-04-22

## 2020-04-22 NOTE — PROGRESS NOTES
"Brandt Wiley is a 40 year old male who is being evaluated via a billable telephone visit.      The patient has been notified of following:     \"This telephone visit will be conducted via a call between you and your physician/provider. We have found that certain health care needs can be provided without the need for a physical exam.  This service lets us provide the care you need with a short phone conversation.  If a prescription is necessary we can send it directly to your pharmacy.  If lab work is needed we can place an order for that and you can then stop by our lab to have the test done at a later time.    Telephone visits are billed at different rates depending on your insurance coverage. During this emergency period, for some insurers they may be billed the same as an in-person visit.  Please reach out to your insurance provider with any questions.    If during the course of the call the physician/provider feels a telephone visit is not appropriate, you will not be charged for this service.\"    Patient has given verbal consent for Telephone visit?  Yes    How would you like to obtain your AVS? Mail a copy  Additional provider notes: The caregiver of Brandt Wiley was contacted via telephone to discuss the progress of the right and left foot.  The caregiver relates that the dry cracked skin has not resolved.  The patient has tried 2 separate applications of antifungal medication with no relief of dry cracked skin.    DIAGNOSIS    (L85.3) Xerosis of skin  (primary encounter diagnosis)  Comment: Recalcitrant  Plan: ammonium lactate (LAC-HYDRIN) 12 % external         cream        Instructed to apply every night under plastic occlusion.    (R23.4) Fissure in skin of foot     Plan: ammonium lactate (LAC-HYDRIN) 12 % external         cream          I have explained to Brandt's caregiver about the condition.  We discussed the nature of the condition as well as the treatment plan and expected length of recovery.  All " question and concerns were addressed.      Phone call duration: 5 minutes    Abdulaziz Lindo DPM

## 2020-04-22 NOTE — PATIENT INSTRUCTIONS
Apply hydrating cream to the soles of both feet every night with plastic occlusion.    May use pumice stone on the feet to remove dead superficial skin.

## 2020-06-15 DIAGNOSIS — F20.9 SCHIZOPHRENIA, UNSPECIFIED TYPE (H): ICD-10-CM

## 2020-06-15 RX ORDER — VITAMIN E 268 MG
CAPSULE ORAL
Qty: 30 TABLET | Refills: 3 | Status: SHIPPED | OUTPATIENT
Start: 2020-06-15 | End: 2020-10-01

## 2020-10-01 DIAGNOSIS — F20.9 SCHIZOPHRENIA, UNSPECIFIED TYPE (H): ICD-10-CM

## 2020-10-01 RX ORDER — VITAMIN E 268 MG
CAPSULE ORAL
Qty: 30 TABLET | Refills: 3 | Status: SHIPPED | OUTPATIENT
Start: 2020-10-01 | End: 2021-01-26

## 2020-10-08 ENCOUNTER — MEDICAL CORRESPONDENCE (OUTPATIENT)
Dept: HEALTH INFORMATION MANAGEMENT | Facility: CLINIC | Age: 40
End: 2020-10-08

## 2020-12-09 ENCOUNTER — OFFICE VISIT (OUTPATIENT)
Dept: FAMILY MEDICINE | Facility: CLINIC | Age: 40
End: 2020-12-09
Payer: MEDICAID

## 2020-12-09 VITALS
SYSTOLIC BLOOD PRESSURE: 134 MMHG | RESPIRATION RATE: 16 BRPM | HEART RATE: 82 BPM | WEIGHT: 280 LBS | HEIGHT: 71 IN | BODY MASS INDEX: 39.2 KG/M2 | TEMPERATURE: 98.3 F | DIASTOLIC BLOOD PRESSURE: 78 MMHG

## 2020-12-09 DIAGNOSIS — Z23 NEED FOR PROPHYLACTIC VACCINATION AND INOCULATION AGAINST INFLUENZA: ICD-10-CM

## 2020-12-09 DIAGNOSIS — M70.62 TROCHANTERIC BURSITIS OF LEFT HIP: Primary | ICD-10-CM

## 2020-12-09 PROCEDURE — 99213 OFFICE O/P EST LOW 20 MIN: CPT | Mod: 25 | Performed by: FAMILY MEDICINE

## 2020-12-09 PROCEDURE — 90471 IMMUNIZATION ADMIN: CPT | Performed by: FAMILY MEDICINE

## 2020-12-09 PROCEDURE — 90686 IIV4 VACC NO PRSV 0.5 ML IM: CPT | Performed by: FAMILY MEDICINE

## 2020-12-09 ASSESSMENT — MIFFLIN-ST. JEOR: SCORE: 2199.26

## 2020-12-09 NOTE — PROGRESS NOTES
"Subjective     Brandt Wiley is a 40 year old male who presents to clinic today for the following health issues:    HPI         ED/UC Followup:    Facility:  [facility]   Date of visit: 12/1/2020  Reason for visit: Hypertension and over extended his left hip   Current Status: hip is still sore, says a 4 for pain level,      S: Brandt Wiley is a 40 year old male with L \"hip\" pain.  Side of hip.  No injury    Xray in ED a couple days ago. n egative.    No injury    No swelling or fever or illness.    Walking  OK on it    Problem list, med list, additional histories reviewed and updated, as indicated.      O:/78   Pulse 82   Temp 98.3  F (36.8  C) (Tympanic)   Resp 16   Ht 1.791 m (5' 10.5\")   Wt 127 kg (280 lb)   BMI 39.61 kg/m    GEN: Alert and oriented, in no acute distress  L troch bursa tender.  Mild/mod.  Walking fine.  No pain in groin with rotation of hip    A: trochanteric bursitis, L     P: tylenol.  Time.  Could try injection if continuing to bother.      F/u as needed    "

## 2020-12-29 DIAGNOSIS — E03.2 HYPOTHYROIDISM DUE TO MEDICATION: ICD-10-CM

## 2020-12-30 RX ORDER — LEVOTHYROXINE SODIUM 50 UG/1
TABLET ORAL
Qty: 30 TABLET | Refills: 0 | Status: SHIPPED | OUTPATIENT
Start: 2020-12-30 | End: 2021-02-25

## 2021-01-25 DIAGNOSIS — F20.9 SCHIZOPHRENIA, UNSPECIFIED TYPE (H): ICD-10-CM

## 2021-01-26 RX ORDER — VITAMIN E 268 MG
CAPSULE ORAL
Qty: 30 TABLET | Refills: 3 | Status: SHIPPED | OUTPATIENT
Start: 2021-01-26 | End: 2021-05-21

## 2021-02-11 ENCOUNTER — OFFICE VISIT (OUTPATIENT)
Dept: FAMILY MEDICINE | Facility: CLINIC | Age: 41
End: 2021-02-11
Payer: MEDICAID

## 2021-02-11 VITALS
OXYGEN SATURATION: 97 % | HEART RATE: 86 BPM | HEIGHT: 71 IN | WEIGHT: 279 LBS | BODY MASS INDEX: 39.06 KG/M2 | SYSTOLIC BLOOD PRESSURE: 128 MMHG | DIASTOLIC BLOOD PRESSURE: 74 MMHG | RESPIRATION RATE: 20 BRPM | TEMPERATURE: 97.8 F

## 2021-02-11 DIAGNOSIS — Z00.00 ROUTINE GENERAL MEDICAL EXAMINATION AT A HEALTH CARE FACILITY: Primary | ICD-10-CM

## 2021-02-11 DIAGNOSIS — Z11.59 NEED FOR HEPATITIS C SCREENING TEST: ICD-10-CM

## 2021-02-11 DIAGNOSIS — Z11.4 ENCOUNTER FOR SCREENING FOR HIV: ICD-10-CM

## 2021-02-11 DIAGNOSIS — F20.9 SCHIZOPHRENIA, UNSPECIFIED TYPE (H): ICD-10-CM

## 2021-02-11 LAB
ALBUMIN SERPL-MCNC: 4 G/DL (ref 3.4–5)
ALP SERPL-CCNC: 59 U/L (ref 40–150)
ALT SERPL W P-5'-P-CCNC: 27 U/L (ref 0–70)
ANION GAP SERPL CALCULATED.3IONS-SCNC: 3 MMOL/L (ref 3–14)
AST SERPL W P-5'-P-CCNC: 15 U/L (ref 0–45)
BILIRUB SERPL-MCNC: 0.3 MG/DL (ref 0.2–1.3)
BUN SERPL-MCNC: 11 MG/DL (ref 7–30)
CALCIUM SERPL-MCNC: 9.4 MG/DL (ref 8.5–10.1)
CHLORIDE SERPL-SCNC: 105 MMOL/L (ref 94–109)
CO2 SERPL-SCNC: 28 MMOL/L (ref 20–32)
CREAT SERPL-MCNC: 0.91 MG/DL (ref 0.66–1.25)
ERYTHROCYTE [DISTWIDTH] IN BLOOD BY AUTOMATED COUNT: 13.1 % (ref 10–15)
GFR SERPL CREATININE-BSD FRML MDRD: >90 ML/MIN/{1.73_M2}
GLUCOSE SERPL-MCNC: 81 MG/DL (ref 70–99)
HCT VFR BLD AUTO: 41.3 % (ref 40–53)
HCV AB SERPL QL IA: NONREACTIVE
HGB BLD-MCNC: 13.6 G/DL (ref 13.3–17.7)
HIV 1+2 AB+HIV1 P24 AG SERPL QL IA: NONREACTIVE
MCH RBC QN AUTO: 28.7 PG (ref 26.5–33)
MCHC RBC AUTO-ENTMCNC: 32.9 G/DL (ref 31.5–36.5)
MCV RBC AUTO: 87 FL (ref 78–100)
PLATELET # BLD AUTO: 200 10E9/L (ref 150–450)
POTASSIUM SERPL-SCNC: 4.4 MMOL/L (ref 3.4–5.3)
PROT SERPL-MCNC: 7.7 G/DL (ref 6.8–8.8)
RBC # BLD AUTO: 4.74 10E12/L (ref 4.4–5.9)
SODIUM SERPL-SCNC: 136 MMOL/L (ref 133–144)
TSH SERPL DL<=0.005 MIU/L-ACNC: 1.74 MU/L (ref 0.4–4)
WBC # BLD AUTO: 5.8 10E9/L (ref 4–11)

## 2021-02-11 PROCEDURE — 80053 COMPREHEN METABOLIC PANEL: CPT | Performed by: FAMILY MEDICINE

## 2021-02-11 PROCEDURE — 85027 COMPLETE CBC AUTOMATED: CPT | Performed by: FAMILY MEDICINE

## 2021-02-11 PROCEDURE — 84443 ASSAY THYROID STIM HORMONE: CPT | Performed by: FAMILY MEDICINE

## 2021-02-11 PROCEDURE — 36415 COLL VENOUS BLD VENIPUNCTURE: CPT | Performed by: FAMILY MEDICINE

## 2021-02-11 PROCEDURE — 99396 PREV VISIT EST AGE 40-64: CPT | Performed by: FAMILY MEDICINE

## 2021-02-11 PROCEDURE — 87389 HIV-1 AG W/HIV-1&-2 AB AG IA: CPT | Performed by: FAMILY MEDICINE

## 2021-02-11 PROCEDURE — 86803 HEPATITIS C AB TEST: CPT | Performed by: FAMILY MEDICINE

## 2021-02-11 PROCEDURE — 99213 OFFICE O/P EST LOW 20 MIN: CPT | Mod: 25 | Performed by: FAMILY MEDICINE

## 2021-02-11 ASSESSMENT — ENCOUNTER SYMPTOMS
SHORTNESS OF BREATH: 0
HEARTBURN: 0
PARESTHESIAS: 0
PALPITATIONS: 0
ABDOMINAL PAIN: 0
EYE PAIN: 0
HEARTBURN: 1
HEADACHES: 0
FREQUENCY: 0
FREQUENCY: 1
MYALGIAS: 0
HEMATOCHEZIA: 0
DYSURIA: 0
DIARRHEA: 0
CONSTIPATION: 0
HEMATURIA: 0
DIZZINESS: 0
JOINT SWELLING: 0
NAUSEA: 0
NERVOUS/ANXIOUS: 1
WEAKNESS: 0
SORE THROAT: 0
ARTHRALGIAS: 0
FEVER: 0
CHILLS: 0
COUGH: 0

## 2021-02-11 ASSESSMENT — MIFFLIN-ST. JEOR: SCORE: 2189.73
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
## 2021-02-11 NOTE — PATIENT INSTRUCTIONS
Patient Education     Losing Weight for Heart Health  Excess weight is a major risk factor for heart disease. Losing weight has many benefits including lowering your blood pressure, improving your cholesterol level, and decreasing your risk for diseases such as diabetes and heart disease. It may help keep your arteries open so that your heart can get the oxygen-rich blood it needs. All in all, losing weight makes you healthier and is one of the best ways to improve your heart's health.    Calories and weight loss    Calories are the fuel your body burns for energy. You get the calories you need from the food you eat. For healthy weight loss, women should eat at least 1,200 calories a day, men at least 1,500.    When you eat more calories than you need, your body stores the extra calories as fat. One pound (0.45 kg) of fat equals 3,500 calories.    To lose weight, try to reduce your total calorie intake by 500 calories. To do this, eat 250 calories less each day. Add activity to burn the other 250 calories. Walking 2.5 miles (4 km) burns about 250 calories. Other more intense activities can burn more calories in the time you spend doing them, such as swimming and running. It's important to understand that reducing calorie intake is much more effective at weight loss than is exercise.    Eat a variety of healthy foods to get the nutrients you need while you are cutting your calories to reduce your weight.    Tips for losing weight    Drink 8 to 10 glasses of water a day.    Don t skip meals. Instead, eat smaller portions.    Eat your meals earlier in the day.    Cut out sugary drinks such as soda and fruit juices.    Make your later meals lighter than your earlier meals.    Brisk activity is best  Brisk activity gets your heart pumping faster and it makes it healthier. It s also a great way to burn calories. In fact, your body may keep burning calories for hours after you stop a brisk activity:    Start by walking  10 minutes most days.    Add more time and speed to your walk. Build up as you feel able.    Aim for at least 150 minutes of moderate-intensity aerobic activity such as brisk walking or 75 minutes of vigorous aerobic activity such as swimming laps each week to get the most health benefits.    Get up a move and sit less. Sitting too much is linked with increased risk of heart disease. Moving more and sitting less can help cut this risk.    The most important part of the activity is that you break a sweat. This means your heart is working hard enough to burn fat.  [employer] last reviewed this educational content on 7/1/2019 2000-2020 [employer]. [address] [place] [address], [place] [address]. All rights reserved. This information is not intended as a substitute for professional medical care. Always follow your healthcare professional's instructions.           Patient Education     Prevention Guidelines, Men Ages 40 to 49  Screening tests and vaccines are an important part of managing your health. A screening test is done to find possible disorders or diseases in people who don't have any symptoms. The goal is to find a disease early so lifestyle changes can be made and you can be watched more closely to reduce the risk of disease, or to detect it early enough to treat it most effectively. Screening tests are not considered diagnostic, but are used to determine if more testing is needed. Health counseling is essential, too. Below are guidelines for these, for men ages 40 to 49. Talk with your healthcare provider to make sure you re up to date on what you need.  Screening Who needs it How often   Alcohol misuse All men in this age group At routine exams   Blood pressure All men in this age group Yearly checkup if your blood pressure reading is normal  Normal blood pressure is less than 120/80 mm Hg  If your blood pressure is higher than normal, follow the advice of your healthcare provider      Depression  All men in this age group At routine exams   Type 2 diabetes or prediabetes All men beginning at age 45 and men  without symptoms at any age who are overweight or obese and have 1 or more other risk factors for diabetes At least every 3 years (yearly if blood sugar has begun to rise)   Type 2 diabetes All men with prediabetes Every year   Hepatitis C Men at increased risk for infection - talk with your healthcare provider At routine exams   High cholesterol or triglycerides All men ages 35 and older, and younger men at high risk for coronary artery disease At least every 5 years   HIV All men At routine exams   Obesity All men in this age group At routine exams   Prostate cancer Starting at age 45, talk to healthcare provider about risks and benefits of digital rectal exam (SEAN) and prostate-specific antigen (PSA) screening1 At routine exams   Syphilis Men at increased risk for infection - talk with your healthcare provider At routine exams   Tuberculosis Men at increased risk for infection - talk with your healthcare provider Check with your healthcare provider   Vision All men in this age group Every 2 to 4 years if no risk factors for eye disease2   Vaccine Who needs it How often   Chickenpox (varicella) All men in this age group who have no record of this infection or vaccine 2 doses; the second dose should be given at least 4 weeks after the first dose   Hepatitis A Men at increased risk for infection - talk with your healthcare provider 2 doses given at least 6 months apart   Hepatitis B Men at increased risk for infection - talk with your healthcare provider 3 doses over 6 months; second dose should be given 1 month after the first dose; the third dose should be given at least 2 months after the second dose and at least 4 months after the first dose   Haemophilus influenzae Type B (HIB) Men at increased risk for infection - talk with your healthcare provider 1 to 3 doses   Influenza (flu) All men in this age  group Once a year   Measles, mumps, rubella (MMR) All men in this age group who have no record of these infections or vaccines 1 or 2 doses   Meningococcal Men at increased risk for infection - talk with your healthcare provider 1 or more doses   Pneumococcal conjugate vaccine (PCV13) and pneumococcal polysaccharide vaccine (PPSV23) Men at increased risk for infection - talk with your healthcare provider PCV13: 1 dose ages 19 to 65 (protects against 13 types of pneumococcal bacteria)     PPSV23: 1 to 2 doses through age 64, or 1 dose at 65 or older (protects against 23 types of pneumococcal bacteria)      Tetanus/diphtheria/  pertussis (Td/Tdap) booster All men in this age group Td every 10 years, or a one-time dose of Tdap instead of a Td booster after age 18, then Td every 10 years   Counseling Who needs it How often   Diet and exercise Men who are overweight or obese When diagnosed, and then at routine exams   Sexually transmitted infection prevention Men at increased risk for infection - talk with your healthcare provider At routine exams   Use of daily aspirin Men ages 45 to 79 at risk for cardiovascular health problems At routine exams   Use of tobacco and the health effects it can cause All men in this age group Every exam   [address] [place] [address] [facility]   2[facility] [employer]  [employer] last reviewed this educational content on 2/1/2017 2000-2020 [employer]. [address] [place] [address], [place] [address]. All rights reserved. This information is not intended as a substitute for professional medical care. Always follow your healthcare professional's instructions.

## 2021-02-11 NOTE — LETTER
February 12, 2021      Brandt Wiley  [address] [facility] [id number]        Dear ,    We are writing to inform you of your test results.    Lab results came back unremarkable    Resulted Orders   CBC with platelets   Result Value Ref Range    WBC 5.8 4.0 - 11.0 10e9/L    RBC Count 4.74 4.4 - 5.9 10e12/L    Hemoglobin 13.6 13.3 - 17.7 g/dL    Hematocrit 41.3 40.0 - 53.0 %    MCV 87 78 - 100 fl    MCH 28.7 26.5 - 33.0 pg    MCHC 32.9 31.5 - 36.5 g/dL    RDW 13.1 10.0 - 15.0 %    Platelet Count 200 150 - 450 10e9/L   Comprehensive metabolic panel (BMP + Alb, Alk Phos, ALT, AST, Total. Bili, TP)   Result Value Ref Range    Sodium 136 133 - 144 mmol/L    Potassium 4.4 3.4 - 5.3 mmol/L    Chloride 105 94 - 109 mmol/L    Carbon Dioxide 28 20 - 32 mmol/L    Anion Gap 3 3 - 14 mmol/L    Glucose 81 70 - 99 mg/dL      Comment:      Non Fasting    Urea Nitrogen 11 7 - 30 mg/dL    Creatinine 0.91 0.66 - 1.25 mg/dL    GFR Estimate >90 >60 mL/min/[1.73_m2]      Comment:      Non  GFR Calc  Starting 12/18/2018, serum creatinine based estimated GFR (eGFR) will be   calculated using the Chronic Kidney Disease Epidemiology Collaboration   (CKD-EPI) equation.      GFR Estimate If Black >90 >60 mL/min/[1.73_m2]      Comment:       GFR Calc  Starting 12/18/2018, serum creatinine based estimated GFR (eGFR) will be   calculated using the Chronic Kidney Disease Epidemiology Collaboration   (CKD-EPI) equation.      Calcium 9.4 8.5 - 10.1 mg/dL    Bilirubin Total 0.3 0.2 - 1.3 mg/dL    Albumin 4.0 3.4 - 5.0 g/dL    Protein Total 7.7 6.8 - 8.8 g/dL    Alkaline Phosphatase 59 40 - 150 U/L    ALT 27 0 - 70 U/L    AST 15 0 - 45 U/L   TSH with free T4 reflex   Result Value Ref Range    TSH 1.74 0.40 - 4.00 mU/L   **Hepatitis C Screen Reflex to RNA FUTURE anytime   Result Value Ref Range    Hepatitis C Antibody Nonreactive NR^Nonreactive      Comment:      Assay performance characteristics  have not been established for newborns,   infants, and children     HIV Antigen Antibody Combo   Result Value Ref Range    HIV Antigen Antibody Combo Nonreactive NR^Nonreactive          Comment:      HIV-1 p24 Ag & HIV-1/HIV-2 Ab Not Detected       If you have any questions or concerns, please call the clinic at the number listed above.       Sincerely,      Ap Andres MD

## 2021-02-11 NOTE — NURSING NOTE
"Chief Complaint   Patient presents with     Physical       Initial /74   Pulse 86   Temp 97.8  F (36.6  C) (Tympanic)   Resp 20   Ht 1.791 m (5' 10.5\")   Wt 126.6 kg (279 lb)   SpO2 97%   BMI 39.47 kg/m   Estimated body mass index is 39.47 kg/m  as calculated from the following:    Height as of this encounter: 1.791 m (5' 10.5\").    Weight as of this encounter: 126.6 kg (279 lb).    Patient presents to the clinic using No DME    Health Maintenance that is potentially due pending provider review:  NONE    n/a    Is there anyone who you would like to be able to receive your results? Not Applicable  If yes have patient fill out CASIMIRO    "

## 2021-02-25 DIAGNOSIS — E03.2 HYPOTHYROIDISM DUE TO MEDICATION: ICD-10-CM

## 2021-02-25 RX ORDER — LEVOTHYROXINE SODIUM 50 UG/1
TABLET ORAL
Qty: 90 TABLET | Refills: 3 | Status: SHIPPED | OUTPATIENT
Start: 2021-02-25 | End: 2022-01-06

## 2021-03-22 DIAGNOSIS — F39 MOOD DISORDER (H): ICD-10-CM

## 2021-03-22 DIAGNOSIS — Z79.899 ENCOUNTER FOR LONG-TERM (CURRENT) USE OF HIGH-RISK MEDICATION: Primary | ICD-10-CM

## 2021-05-21 DIAGNOSIS — F20.9 SCHIZOPHRENIA, UNSPECIFIED TYPE (H): ICD-10-CM

## 2021-05-22 RX ORDER — VITAMIN E 268 MG
CAPSULE ORAL
Qty: 30 TABLET | Refills: 8 | Status: SHIPPED | OUTPATIENT
Start: 2021-05-22 | End: 2022-02-03

## 2021-08-25 DIAGNOSIS — Z79.899 NEED FOR PROPHYLACTIC CHEMOTHERAPY: ICD-10-CM

## 2021-08-25 DIAGNOSIS — Z00.00 ROUTINE GENERAL MEDICAL EXAMINATION AT A HEALTH CARE FACILITY: Primary | ICD-10-CM

## 2021-08-25 DIAGNOSIS — Z13.21 SCREENING FOR MALNUTRITION: ICD-10-CM

## 2021-08-31 ENCOUNTER — LAB (OUTPATIENT)
Dept: LAB | Facility: CLINIC | Age: 41
End: 2021-08-31
Payer: MEDICAID

## 2021-08-31 DIAGNOSIS — Z13.21 SCREENING FOR MALNUTRITION: ICD-10-CM

## 2021-08-31 DIAGNOSIS — Z79.899 NEED FOR PROPHYLACTIC CHEMOTHERAPY: ICD-10-CM

## 2021-08-31 DIAGNOSIS — Z00.00 ROUTINE GENERAL MEDICAL EXAMINATION AT A HEALTH CARE FACILITY: ICD-10-CM

## 2021-08-31 DIAGNOSIS — Z79.899 ENCOUNTER FOR LONG-TERM (CURRENT) USE OF HIGH-RISK MEDICATION: ICD-10-CM

## 2021-08-31 DIAGNOSIS — F39 MOOD DISORDER (H): ICD-10-CM

## 2021-08-31 LAB
ALBUMIN SERPL-MCNC: 4.1 G/DL (ref 3.4–5)
ALP SERPL-CCNC: 55 U/L (ref 40–150)
ALT SERPL W P-5'-P-CCNC: 28 U/L (ref 0–70)
AMYLASE SERPL-CCNC: 77 U/L (ref 30–110)
ANION GAP SERPL CALCULATED.3IONS-SCNC: 4 MMOL/L (ref 3–14)
AST SERPL W P-5'-P-CCNC: 16 U/L (ref 0–45)
BASOPHILS # BLD AUTO: 0 10E3/UL (ref 0–0.2)
BASOPHILS NFR BLD AUTO: 1 %
BILIRUB DIRECT SERPL-MCNC: <0.1 MG/DL (ref 0–0.2)
BILIRUB SERPL-MCNC: 0.4 MG/DL (ref 0.2–1.3)
BUN SERPL-MCNC: 14 MG/DL (ref 7–30)
CALCIUM SERPL-MCNC: 9 MG/DL (ref 8.5–10.1)
CHLORIDE BLD-SCNC: 106 MMOL/L (ref 94–109)
CHOLEST SERPL-MCNC: 222 MG/DL
CO2 SERPL-SCNC: 25 MMOL/L (ref 20–32)
CREAT SERPL-MCNC: 0.96 MG/DL (ref 0.66–1.25)
DEPRECATED CALCIDIOL+CALCIFEROL SERPL-MC: 35 UG/L (ref 20–75)
EOSINOPHIL # BLD AUTO: 0.1 10E3/UL (ref 0–0.7)
EOSINOPHIL NFR BLD AUTO: 1 %
ERYTHROCYTE [DISTWIDTH] IN BLOOD BY AUTOMATED COUNT: 13.5 % (ref 10–15)
FASTING STATUS PATIENT QL REPORTED: YES
GFR SERPL CREATININE-BSD FRML MDRD: >90 ML/MIN/1.73M2
GLUCOSE BLD-MCNC: 85 MG/DL (ref 70–99)
HBA1C MFR BLD: 4.7 % (ref 0–5.6)
HCT VFR BLD AUTO: 40.8 % (ref 40–53)
HDLC SERPL-MCNC: 59 MG/DL
HGB BLD-MCNC: 13.7 G/DL (ref 13.3–17.7)
LDLC SERPL CALC-MCNC: 133 MG/DL
LITHIUM SERPL-SCNC: 1.1 MMOL/L
LYMPHOCYTES # BLD AUTO: 2.3 10E3/UL (ref 0.8–5.3)
LYMPHOCYTES NFR BLD AUTO: 42 %
MCH RBC QN AUTO: 29 PG (ref 26.5–33)
MCHC RBC AUTO-ENTMCNC: 33.6 G/DL (ref 31.5–36.5)
MCV RBC AUTO: 86 FL (ref 78–100)
MONOCYTES # BLD AUTO: 0.5 10E3/UL (ref 0–1.3)
MONOCYTES NFR BLD AUTO: 8 %
NEUTROPHILS # BLD AUTO: 2.6 10E3/UL (ref 1.6–8.3)
NEUTROPHILS NFR BLD AUTO: 48 %
NONHDLC SERPL-MCNC: 163 MG/DL
PLATELET # BLD AUTO: 208 10E3/UL (ref 150–450)
POTASSIUM BLD-SCNC: 4.2 MMOL/L (ref 3.4–5.3)
PROLACTIN SERPL-MCNC: 47 UG/L (ref 2–18)
PROT SERPL-MCNC: 7.9 G/DL (ref 6.8–8.8)
RBC # BLD AUTO: 4.73 10E6/UL (ref 4.4–5.9)
SODIUM SERPL-SCNC: 135 MMOL/L (ref 133–144)
T4 FREE SERPL-MCNC: 0.65 NG/DL (ref 0.76–1.46)
TRIGL SERPL-MCNC: 152 MG/DL
TSH SERPL DL<=0.005 MIU/L-ACNC: 1.73 MU/L (ref 0.4–4)
VALPROATE SERPL-MCNC: 94 MG/L
WBC # BLD AUTO: 5.5 10E3/UL (ref 4–11)

## 2021-08-31 PROCEDURE — 80178 ASSAY OF LITHIUM: CPT

## 2021-08-31 PROCEDURE — 83036 HEMOGLOBIN GLYCOSYLATED A1C: CPT

## 2021-08-31 PROCEDURE — 84443 ASSAY THYROID STIM HORMONE: CPT

## 2021-08-31 PROCEDURE — 82306 VITAMIN D 25 HYDROXY: CPT

## 2021-08-31 PROCEDURE — 82150 ASSAY OF AMYLASE: CPT

## 2021-08-31 PROCEDURE — 80307 DRUG TEST PRSMV CHEM ANLYZR: CPT | Mod: 90

## 2021-08-31 PROCEDURE — 84439 ASSAY OF FREE THYROXINE: CPT

## 2021-08-31 PROCEDURE — 36415 COLL VENOUS BLD VENIPUNCTURE: CPT

## 2021-08-31 PROCEDURE — 80164 ASSAY DIPROPYLACETIC ACD TOT: CPT

## 2021-08-31 PROCEDURE — 80053 COMPREHEN METABOLIC PANEL: CPT

## 2021-08-31 PROCEDURE — 85025 COMPLETE CBC W/AUTO DIFF WBC: CPT

## 2021-08-31 PROCEDURE — 80061 LIPID PANEL: CPT

## 2021-08-31 PROCEDURE — 84146 ASSAY OF PROLACTIN: CPT

## 2021-08-31 PROCEDURE — 82248 BILIRUBIN DIRECT: CPT

## 2021-09-02 LAB
AMPHETAMINES SERPL QL SCN: NEGATIVE NG/ML
BARBITURATES SERPL QL SCN: NEGATIVE UG/ML
BENZODIAZ SERPL QL SCN: NEGATIVE NG/ML
CANNABINOIDS SERPL QL SCN: NEGATIVE NG/ML
COCAINE SERPL QL SCN: NEGATIVE NG/ML
ETHANOL SERPL-MCNC: NEGATIVE GM/DL
METHADONE SERPL QL SCN: NEGATIVE NG/ML
OPIATES SERPL QL SCN: NEGATIVE NG/ML
OXYCODONE SERPL QL: NEGATIVE NG/ML
PCP SERPL QL SCN: NEGATIVE NG/ML
PROPOXYPH SERPL QL SCN: NEGATIVE NG/ML

## 2022-01-06 DIAGNOSIS — E03.2 HYPOTHYROIDISM DUE TO MEDICATION: ICD-10-CM

## 2022-01-06 RX ORDER — LEVOTHYROXINE SODIUM 50 UG/1
TABLET ORAL
Qty: 28 TABLET | Refills: 6 | Status: SHIPPED | OUTPATIENT
Start: 2022-01-06 | End: 2022-05-05

## 2022-02-01 ENCOUNTER — OFFICE VISIT (OUTPATIENT)
Dept: FAMILY MEDICINE | Facility: CLINIC | Age: 42
End: 2022-02-01
Payer: MEDICAID

## 2022-02-01 VITALS
BODY MASS INDEX: 38.5 KG/M2 | HEIGHT: 71 IN | HEART RATE: 80 BPM | SYSTOLIC BLOOD PRESSURE: 130 MMHG | RESPIRATION RATE: 18 BRPM | WEIGHT: 275 LBS | DIASTOLIC BLOOD PRESSURE: 82 MMHG | TEMPERATURE: 97.7 F

## 2022-02-01 DIAGNOSIS — E78.5 HYPERLIPIDEMIA LDL GOAL <160: ICD-10-CM

## 2022-02-01 DIAGNOSIS — E03.9 HYPOTHYROIDISM, UNSPECIFIED TYPE: ICD-10-CM

## 2022-02-01 DIAGNOSIS — Z00.00 ROUTINE GENERAL MEDICAL EXAMINATION AT A HEALTH CARE FACILITY: Primary | ICD-10-CM

## 2022-02-01 DIAGNOSIS — E66.01 MORBID OBESITY DUE TO EXCESS CALORIES (H): ICD-10-CM

## 2022-02-01 LAB
ANION GAP SERPL CALCULATED.3IONS-SCNC: 3 MMOL/L (ref 3–14)
BUN SERPL-MCNC: 12 MG/DL (ref 7–30)
CALCIUM SERPL-MCNC: 9.3 MG/DL (ref 8.5–10.1)
CHLORIDE BLD-SCNC: 109 MMOL/L (ref 94–109)
CO2 SERPL-SCNC: 26 MMOL/L (ref 20–32)
CREAT SERPL-MCNC: 0.88 MG/DL (ref 0.66–1.25)
ERYTHROCYTE [DISTWIDTH] IN BLOOD BY AUTOMATED COUNT: 13.4 % (ref 10–15)
GFR SERPL CREATININE-BSD FRML MDRD: >90 ML/MIN/1.73M2
GLUCOSE BLD-MCNC: 88 MG/DL (ref 70–99)
HCT VFR BLD AUTO: 38.7 % (ref 40–53)
HGB BLD-MCNC: 13 G/DL (ref 13.3–17.7)
MCH RBC QN AUTO: 29.7 PG (ref 26.5–33)
MCHC RBC AUTO-ENTMCNC: 33.6 G/DL (ref 31.5–36.5)
MCV RBC AUTO: 89 FL (ref 78–100)
PLATELET # BLD AUTO: 194 10E3/UL (ref 150–450)
POTASSIUM BLD-SCNC: 4.3 MMOL/L (ref 3.4–5.3)
RBC # BLD AUTO: 4.37 10E6/UL (ref 4.4–5.9)
SODIUM SERPL-SCNC: 138 MMOL/L (ref 133–144)
TSH SERPL DL<=0.005 MIU/L-ACNC: 0.51 MU/L (ref 0.4–4)
WBC # BLD AUTO: 5.8 10E3/UL (ref 4–11)

## 2022-02-01 PROCEDURE — 99396 PREV VISIT EST AGE 40-64: CPT | Performed by: FAMILY MEDICINE

## 2022-02-01 PROCEDURE — 36415 COLL VENOUS BLD VENIPUNCTURE: CPT | Performed by: FAMILY MEDICINE

## 2022-02-01 PROCEDURE — 85027 COMPLETE CBC AUTOMATED: CPT | Performed by: FAMILY MEDICINE

## 2022-02-01 PROCEDURE — 84443 ASSAY THYROID STIM HORMONE: CPT | Performed by: FAMILY MEDICINE

## 2022-02-01 PROCEDURE — 80048 BASIC METABOLIC PNL TOTAL CA: CPT | Performed by: FAMILY MEDICINE

## 2022-02-01 PROCEDURE — 99214 OFFICE O/P EST MOD 30 MIN: CPT | Mod: 25 | Performed by: FAMILY MEDICINE

## 2022-02-01 ASSESSMENT — PATIENT HEALTH QUESTIONNAIRE - PHQ9
SUM OF ALL RESPONSES TO PHQ QUESTIONS 1-9: 3
SUM OF ALL RESPONSES TO PHQ QUESTIONS 1-9: 3
10. IF YOU CHECKED OFF ANY PROBLEMS, HOW DIFFICULT HAVE THESE PROBLEMS MADE IT FOR YOU TO DO YOUR WORK, TAKE CARE OF THINGS AT HOME, OR GET ALONG WITH OTHER PEOPLE: VERY DIFFICULT

## 2022-02-01 ASSESSMENT — ENCOUNTER SYMPTOMS
FREQUENCY: 1
PARESTHESIAS: 0
BRUISES/BLEEDS EASILY: 0
EYE PAIN: 0
HEMATOCHEZIA: 0
DIARRHEA: 0
DYSURIA: 0
NAUSEA: 0
FREQUENCY: 0
JOINT SWELLING: 0
HEMATURIA: 0
SHORTNESS OF BREATH: 0
CONSTIPATION: 0
HEADACHES: 0
ABDOMINAL PAIN: 0
COUGH: 0
WEAKNESS: 0
DIZZINESS: 0
NERVOUS/ANXIOUS: 1
ARTHRALGIAS: 0
CHILLS: 0
SORE THROAT: 0
HEARTBURN: 0
FEVER: 0
ADENOPATHY: 0
MYALGIAS: 0
PALPITATIONS: 0

## 2022-02-01 ASSESSMENT — PAIN SCALES - GENERAL: PAINLEVEL: NO PAIN (0)

## 2022-02-01 ASSESSMENT — MIFFLIN-ST. JEOR: SCORE: 2166.58

## 2022-02-01 NOTE — PATIENT INSTRUCTIONS
Preventive Health Recommendations  Male Ages 40 to 49    Yearly exam:             See your health care provider every year in order to  o   Review health changes.   o   Discuss preventive care.    o   Review your medicines if your doctor has prescribed any.    You should be tested each year for STDs (sexually transmitted diseases) if you re at risk.     Have a cholesterol test every 5 years.     Have a colonoscopy (test for colon cancer) if someone in your family has had colon cancer or polyps before age 50.     After age 45, have a diabetes test (fasting glucose). If you are at risk for diabetes, you should have this test every 3 years.      Talk with your health care provider about whether or not a prostate cancer screening test (PSA) is right for you.    Shots: Get a flu shot each year. Get a tetanus shot every 10 years.     Nutrition:    Eat at least 5 servings of fruits and vegetables daily.     Eat whole-grain bread, whole-wheat pasta and brown rice instead of white grains and rice.     Get adequate Calcium and Vitamin D.     Lifestyle    Exercise for at least 150 minutes a week (30 minutes a day, 5 days a week). This will help you control your weight and prevent disease.     Limit alcohol to one drink per day.     No smoking.     Wear sunscreen to prevent skin cancer.     See your dentist every six months for an exam and cleaning.      Patient Education     Weight Management: Exercise and Activity    Studies show that people who exercise are the most likely to lose weight and keep it off. Exercise burns calories. It helps build muscle to make your body stronger. Make exercise an important part of your weight-management plan.   Make activity part of your day  You may not think you have the time to exercise. But you can work activity into your daily life--you just need to be committed. Take 10 minutes out of your lunch hour to take a walk. Walk to the [employer] to get your paper instead of having it  delivered. Make it a habit to take the stairs instead of the elevator. Park in a far away parking spot instead of the closest. You ll be surprised at how fast these little changes can make a difference.   Some people really cannot walk very far, and tire out quickly with exercise. Instead of becoming discouraged, resolve to do what you can do, and work to make that a regular frequent habit.    The benefits of exercise  Exercise offers many benefits including:     Exercise increases how fast your body burns calories (your metabolism).    Regular exercise can increase the amount of muscle in your body. Muscle burns calories faster than fat. The more muscle you have, the more calories you burn.    Exercise gives you energy and curbs your appetite.    Exercise decreases stress and helps you sleep better. Find out for yourself what time of day works best for you.  Make exercise fun  Exercise can be fun. Choose an activity you enjoy. You may even get a friend to do it with you:     Take a resistance-training or aerobics class.    Join a team sport.    Take a dance class.    Walk the dog.    Ride a bike.  If you have health problems, always ask your healthcare provider before you start an exercise program. Have a  help you develop a plan that s safe for you.   [employer] last reviewed this educational content on 11/1/2020 2000-2021 [employer]. All rights reserved. This information is not intended as a substitute for professional medical care. Always follow your healthcare professional's instructions.

## 2022-02-01 NOTE — NURSING NOTE
"Chief Complaint   Patient presents with     Physical     /82 (Cuff Size: Adult Large)   Pulse 80   Temp 97.7  F (36.5  C) (Tympanic)   Resp 18   Ht 1.791 m (5' 10.5\")   Wt 124.7 kg (275 lb)   BMI 38.90 kg/m   Estimated body mass index is 38.9 kg/m  as calculated from the following:    Height as of this encounter: 1.791 m (5' 10.5\").    Weight as of this encounter: 124.7 kg (275 lb).  Patient presents to the clinic using No DME      Health Maintenance that is potentially due pending provider review:    Health Maintenance Due   Topic Date Due     ANNUAL REVIEW OF HM ORDERS  Never done     ADVANCE CARE PLANNING  Never done     INFLUENZA VACCINE (1) 09/01/2021     COVID-19 Vaccine (3 - Booster for Moderna series) 09/15/2021                "

## 2022-02-01 NOTE — PROGRESS NOTES
SUBJECTIVE:   CC: Brandt Wiley is an 42 year old male who presents for preventative health visit.     Patient has been advised of split billing requirements and indicates understanding: Yes  Healthy Habits:     Getting at least 3 servings of Calcium per day:  Yes    Bi-annual eye exam:  Yes    Dental care twice a year:  Yes    Sleep apnea or symptoms of sleep apnea:  None    Diet:  Regular (no restrictions)    Frequency of exercise:  None    Taking medications regularly:  Yes    Medication side effects:  None    PHQ-2 Total Score: 3    Additional concerns today:  No      PROBLEMS TO ADD ON...      Today's PHQ-2 Score:   PHQ-2 ( 1999 Pfizer) 2/1/2022   Q1: Little interest or pleasure in doing things 3   Q2: Feeling down, depressed or hopeless 0   PHQ-2 Score 3   PHQ-2 Total Score (12-17 Years)- Positive if 3 or more points; Administer PHQ-A if positive -   Q1: Little interest or pleasure in doing things Nearly every day   Q2: Feeling down, depressed or hopeless Not at all   PHQ-2 Score 3       Abuse: Current or Past(Physical, Sexual or Emotional)- No  Do you feel safe in your environment? Yes    Have you ever done Advance Care Planning? (For example, a Health Directive, POLST, or a discussion with a medical provider or your loved ones about your wishes): Group home patient     Social History     Tobacco Use     Smoking status: Never Smoker     Smokeless tobacco: Never Used   Substance Use Topics     Alcohol use: No         Alcohol Use 2/1/2022   Prescreen: >3 drinks/day or >7 drinks/week? No       Last PSA: No results found for: PSA    Reviewed orders with patient. Reviewed health maintenance and updated orders accordingly - Yes  Lab work is in process  Labs reviewed in EPIC  BP Readings from Last 3 Encounters:   02/01/22 130/82   02/11/21 128/74   12/09/20 134/78    Wt Readings from Last 3 Encounters:   02/01/22 124.7 kg (275 lb)   02/11/21 126.6 kg (279 lb)   12/09/20 127 kg (280 lb)                  Patient  Active Problem List   Diagnosis     Schizophrenia (H)     HTN (hypertension)     Mental retardation     Hypothyroidism     Hyperlipidemia LDL goal <160     Abnormal stress test     Exotropia of right eye     Morbid obesity due to excess calories (H)     Lithium use     No past surgical history on file.    Social History     Tobacco Use     Smoking status: Never Smoker     Smokeless tobacco: Never Used   Substance Use Topics     Alcohol use: No     Family History   Problem Relation Age of Onset     Diabetes No family hx of          Current Outpatient Medications   Medication Sig Dispense Refill     ammonium lactate (LAC-HYDRIN) 12 % external cream Apply topically 2 times daily as needed for dry skin 385 g 3     citalopram (CELEXA) 40 MG tablet TAKE ONE TABLET BY MOUTH EVERY DAY 30 tablet 0     clonazePAM (KLONOPIN) 0.5 MG tablet TAKE ONE TABLET BY MOUTH TWICE DAILY AT 8 AM AND 8 PM 60 tablet 0     clonazePAM (KLONOPIN) 1 MG tablet TAKE ONE TABLET BY MOUTH EVERY DAY AS NEEDED AGITATION 20 tablet 0     DENTA 5000 PLUS 1.1 % CREA BRUSH TEETH WITH A PEA SIZED AMOUNT FOR 60 SECONDS TWICE DAILY. NOTHING BY MOUTH FOR 30 MINUTES FOLLOWING  3     divalproex sodium extended-release (DEPAKOTE ER) 500 MG 24 hr tablet TAKE THREE TABLETS BY MOUTH EVERY DAY 90 tablet 0     haloperidol (HALDOL) 10 MG tablet TAKE ONE TABLET BY MOUTH TWICE DAILY AT 8 AM AND 12 PM AND TAKE 1 AND 1/2 TABLETS BY MOUTH AT BEDTIME 105 tablet 0     levothyroxine (SYNTHROID/LEVOTHROID) 50 MCG tablet TAKE ONE TABLET BY MOUTH EVERY DAY 28 tablet 6     lithium 300 MG capsule Take 2 capsules (600 mg) by mouth 2 times daily (with meals) 120 capsule 0     lithium 300 MG tablet 2 in AM, one in  tablet 3     NEW MED X-vitate 40%, apply thinly, severe dry feet       QUEtiapine (SEROQUEL) 300 MG tablet TAKE ONE TABLET BY MOUTH EVERY DAY AT 12 PM 30 tablet 0     QUEtiapine (SEROQUEL) 400 MG tablet TAKE ONE TABLET BY MOUTH TWICE DAILY 60 tablet 0     SM VITAMIN  B-6 100 MG TABS TAKE 1 TABLET BY MOUTH EVERY DAY 30 tablet 8     terbinafine (LAMISIL AT) 1 % external cream Apply topically 2 times daily Plastic occlusion at night 12 g 1     trihexyphenidyl (ARTANE) 5 MG tablet TAKE ONE TABLET BY MOUTH TWICE DAILY 60 tablet 3     Allergies   Allergen Reactions     Caffeine      Chocolate      Chocolate Flavor Diarrhea     Recent Labs   Lab Test 08/31/21  0928 02/11/21  1013 03/11/20  0915 01/09/20  1650 11/15/18  1045 09/22/17  0821 08/23/17  1433   A1C 4.7  --  4.5  --   --   --  4.7   *  --   --  107*  --  86 109*   HDL 59  --   --  54  --  45 44   TRIG 152*  --   --  113  --  218* 227*   ALT 28 27  --  25   < > 27 26   CR 0.96 0.91  --  0.78   < >  --  0.79   GFRESTIMATED >90 >90  --  >90   < >  --  >90   GFRESTBLACK  --  >90  --  >90   < >  --  >90   POTASSIUM 4.2 4.4  --  3.9   < >  --  4.1   TSH 1.73 1.74  --  1.78   < >  --  1.39    < > = values in this interval not displayed.        Reviewed and updated as needed this visit by clinical staff  Tobacco  Allergies  Meds             Reviewed and updated as needed this visit by Provider                   Review of Systems   Constitutional: Negative for chills and fever.   HENT: Negative for congestion, ear pain, hearing loss and sore throat.    Eyes: Negative for pain and visual disturbance.   Respiratory: Negative for cough and shortness of breath.    Cardiovascular: Negative for chest pain, palpitations and peripheral edema.   Gastrointestinal: Negative for abdominal pain, constipation, diarrhea, heartburn, hematochezia and nausea.   Endocrine: Negative for cold intolerance and heat intolerance.   Genitourinary: Negative for dysuria, frequency, genital sores, hematuria, impotence, penile discharge and urgency.   Musculoskeletal: Negative for arthralgias, joint swelling and myalgias.   Skin: Negative for rash.   Allergic/Immunologic: Negative for environmental allergies and food allergies.   Neurological: Negative  "for dizziness, weakness, headaches and paresthesias.   Hematological: Negative for adenopathy. Does not bruise/bleed easily.   Psychiatric/Behavioral: Positive for mood changes. The patient is nervous/anxious.      The 10-year ASCVD risk score (Ricardo DOVE Jr., et al., 2013) is: 1.3%    Values used to calculate the score:      Age: 42 years      Sex: Male      Is Non- : No      Diabetic: No      Tobacco smoker: No      Systolic Blood Pressure: 130 mmHg      Is BP treated: No      HDL Cholesterol: 59 mg/dL      Total Cholesterol: 222 mg/dL      OBJECTIVE:   /82 (Cuff Size: Adult Large)   Pulse 80   Temp 97.7  F (36.5  C) (Tympanic)   Resp 18   Ht 1.791 m (5' 10.5\")   Wt 124.7 kg (275 lb)   BMI 38.90 kg/m      Physical Exam  GENERAL: alert, no distress and obese  EYES: Eyes grossly normal to inspection, PERRL and conjunctivae and sclerae normal  HENT: normal cephalic/atraumatic, nose and mouth without ulcers or lesions, oropharynx clear and oral mucous membranes moist  NECK: no adenopathy, no asymmetry, masses, or scars and thyroid normal to palpation  RESP: lungs clear to auscultation - no rales, rhonchi or wheezes  CV: regular rates and rhythm, normal S1 S2, no S3 or S4 and no murmur, click or rub  ABDOMEN: soft, nontender  MS: no gross musculoskeletal defects noted, no edema  SKIN: no suspicious lesions or rashes  NEURO: Normal strength and tone, mentation intact and speech normal  PSYCH: pressured speech, communicating well, well groomed        ASSESSMENT/PLAN:   (Z00.00) Routine general medical examination at a health care facility  (primary encounter diagnosis)  Comment: Medically stable.  Medications reviewed and no changes made.  Recommended regular exercise, healthy diet and weight loss.  Patient will continue to follow psychiatry for schizophrenia, mental retardation.  Suggested to schedule routine eye exam as well.      (E66.01) Morbid obesity due to excess calories " "(H)  Comment: as above   Plan:     (E78.5) Hyperlipidemia LDL goal <160  Comment: Stressed on diet and exercise  The 10-year ASCVD risk score (Ricardomalick DOVE Jr., et al., 2013) is: 1.3%    Values used to calculate the score:      Age: 42 years      Sex: Male      Is Non- : No      Diabetic: No      Tobacco smoker: No      Systolic Blood Pressure: 130 mmHg      Is BP treated: No      HDL Cholesterol: 59 mg/dL      Total Cholesterol: 222 mg/dL  Plan:    (E03.9) Hypothyroidism, unspecified type  Comment: TSH ordered, will titrate levothyroxine dose accordingly  Plan: CBC with platelets, TSH with free T4 reflex,         Basic metabolic panel  (Ca, Cl, CO2, Creat,         Gluc, K, Na, BUN)         COUNSELING:   Reviewed preventive health counseling, as reflected in patient instructions    Estimated body mass index is 38.9 kg/m  as calculated from the following:    Height as of this encounter: 1.791 m (5' 10.5\").    Weight as of this encounter: 124.7 kg (275 lb).     Weight management plan: Discussed healthy diet and exercise guidelines    He reports that he has never smoked. He has never used smokeless tobacco.      Counseling Resources:  ATP IV Guidelines  Pooled Cohorts Equation Calculator  FRAX Risk Assessment  ICSI Preventive Guidelines  Dietary Guidelines for Americans, 2010  USDA's MyPlate  ASA Prophylaxis  Lung CA Screening    Ap Andres MD  [facility]  "

## 2022-02-03 DIAGNOSIS — F20.9 SCHIZOPHRENIA, UNSPECIFIED TYPE (H): ICD-10-CM

## 2022-02-03 RX ORDER — VITAMIN E 268 MG
CAPSULE ORAL
Qty: 30 TABLET | Refills: 11 | Status: SHIPPED | OUTPATIENT
Start: 2022-02-03 | End: 2022-12-01

## 2022-03-24 ENCOUNTER — DOCUMENTATION ONLY (OUTPATIENT)
Dept: LAB | Facility: CLINIC | Age: 42
End: 2022-03-24
Payer: MEDICAID

## 2022-03-24 DIAGNOSIS — E03.9 HYPOTHYROIDISM, UNSPECIFIED TYPE: Primary | ICD-10-CM

## 2022-03-24 NOTE — PROGRESS NOTES
Brandt Wiley has an upcoming lab appointment:    Future Appointments   Date Time Provider Department Center   3/25/2022 10:00 AM [facility] LAB [facility]     Patient is scheduled for the following lab(s): Only order in chart is a UA ordered last August.  Is this all he needs?  Please place future orders if more labs needed. Thanks    There is no order available. Please review and place either future orders or HMPO (Review of Health Maintenance Protocol Orders), as appropriate.    Health Maintenance Due   Topic     ANNUAL REVIEW OF HM ORDERS      Romana Dhillon

## 2022-03-24 NOTE — CONFIDENTIAL NOTE
Patient's group home member Yifan is notified to let patient know that TSH is ordered for tomorrow as well.    ROHIT Mustafa

## 2022-03-25 ENCOUNTER — LAB (OUTPATIENT)
Dept: LAB | Facility: CLINIC | Age: 42
End: 2022-03-25
Payer: MEDICAID

## 2022-03-25 DIAGNOSIS — E03.9 HYPOTHYROIDISM, UNSPECIFIED TYPE: ICD-10-CM

## 2022-03-25 LAB — TSH SERPL DL<=0.005 MIU/L-ACNC: 0.86 MU/L (ref 0.4–4)

## 2022-03-25 PROCEDURE — 84443 ASSAY THYROID STIM HORMONE: CPT

## 2022-03-25 PROCEDURE — 36415 COLL VENOUS BLD VENIPUNCTURE: CPT

## 2022-05-05 ENCOUNTER — OFFICE VISIT (OUTPATIENT)
Dept: FAMILY MEDICINE | Facility: CLINIC | Age: 42
End: 2022-05-05
Payer: MEDICAID

## 2022-05-05 VITALS
OXYGEN SATURATION: 96 % | WEIGHT: 271 LBS | BODY MASS INDEX: 37.94 KG/M2 | HEART RATE: 71 BPM | SYSTOLIC BLOOD PRESSURE: 124 MMHG | TEMPERATURE: 98.2 F | DIASTOLIC BLOOD PRESSURE: 83 MMHG | RESPIRATION RATE: 22 BRPM | HEIGHT: 71 IN

## 2022-05-05 DIAGNOSIS — E03.2 HYPOTHYROIDISM DUE TO MEDICATION: Primary | ICD-10-CM

## 2022-05-05 DIAGNOSIS — F20.9 SCHIZOPHRENIA, UNSPECIFIED TYPE (H): ICD-10-CM

## 2022-05-05 PROCEDURE — 93000 ELECTROCARDIOGRAM COMPLETE: CPT | Performed by: FAMILY MEDICINE

## 2022-05-05 PROCEDURE — 99214 OFFICE O/P EST MOD 30 MIN: CPT | Performed by: FAMILY MEDICINE

## 2022-05-05 RX ORDER — LEVOTHYROXINE SODIUM 50 UG/1
50 TABLET ORAL DAILY
Qty: 30 TABLET | Refills: 11 | Status: SHIPPED | OUTPATIENT
Start: 2022-05-05 | End: 2023-05-12

## 2022-05-05 NOTE — PROGRESS NOTES
"D: Brandt Wiley is a 42 year old male with hypothyroid: labs recently were fine, needs fills    Schizophrenia: needs ekg for pysch meds per mental health      O:/83 (BP Location: Right arm, Patient Position: Sitting, Cuff Size: Adult Regular)   Pulse 71   Temp 98.2  F (36.8  C) (Tympanic)   Resp 22   Ht 1.803 m (5' 11\")   Wt 122.9 kg (271 lb)   SpO2 96%   BMI 37.80 kg/m    GEN: Alert and oriented, in no acute distress  CV: RRR, no murmur    A: hypothyroid      Schizophrenia, stable    EKG: NSR, no ST or T wave changes.  Normal axis, intervals.  No Q waves.      P: fills.  Reviewed labs.     ekg done for mental health provider, looked good as well.    "

## 2022-05-06 ENCOUNTER — LAB (OUTPATIENT)
Dept: LAB | Facility: CLINIC | Age: 42
End: 2022-05-06
Payer: MEDICAID

## 2022-05-06 DIAGNOSIS — Z79.899 ENCOUNTER FOR LONG-TERM (CURRENT) USE OF HIGH-RISK MEDICATION: Primary | ICD-10-CM

## 2022-05-06 LAB
ANION GAP SERPL CALCULATED.3IONS-SCNC: 6 MMOL/L (ref 3–14)
BUN SERPL-MCNC: 11 MG/DL (ref 7–30)
CALCIUM SERPL-MCNC: 9.1 MG/DL (ref 8.5–10.1)
CHLORIDE BLD-SCNC: 105 MMOL/L (ref 94–109)
CO2 SERPL-SCNC: 28 MMOL/L (ref 20–32)
CREAT SERPL-MCNC: 1 MG/DL (ref 0.66–1.25)
GFR SERPL CREATININE-BSD FRML MDRD: >90 ML/MIN/1.73M2
GLUCOSE BLD-MCNC: 88 MG/DL (ref 70–99)
LITHIUM SERPL-SCNC: 0.8 MMOL/L
POTASSIUM BLD-SCNC: 4.3 MMOL/L (ref 3.4–5.3)
SODIUM SERPL-SCNC: 139 MMOL/L (ref 133–144)
T4 FREE SERPL-MCNC: 0.57 NG/DL (ref 0.76–1.46)
TSH SERPL DL<=0.005 MIU/L-ACNC: 0.68 MU/L (ref 0.4–4)

## 2022-05-06 PROCEDURE — 84443 ASSAY THYROID STIM HORMONE: CPT

## 2022-05-06 PROCEDURE — 80178 ASSAY OF LITHIUM: CPT

## 2022-05-06 PROCEDURE — 36415 COLL VENOUS BLD VENIPUNCTURE: CPT

## 2022-05-06 PROCEDURE — 84439 ASSAY OF FREE THYROXINE: CPT

## 2022-05-06 PROCEDURE — 80048 BASIC METABOLIC PNL TOTAL CA: CPT

## 2022-12-01 DIAGNOSIS — F20.9 SCHIZOPHRENIA, UNSPECIFIED TYPE (H): ICD-10-CM

## 2022-12-01 RX ORDER — VITAMIN E 268 MG
CAPSULE ORAL
Qty: 30 TABLET | Refills: 11 | Status: SHIPPED | OUTPATIENT
Start: 2022-12-01 | End: 2023-12-07

## 2022-12-01 NOTE — TELEPHONE ENCOUNTER
Routing to ordering provider for consideration, not on refill protocol.           Twila Larose     RN MSN

## 2023-07-19 DIAGNOSIS — E03.2 HYPOTHYROIDISM DUE TO MEDICATION: ICD-10-CM

## 2023-07-20 RX ORDER — LEVOTHYROXINE SODIUM 50 UG/1
50 TABLET ORAL EVERY EVENING
Qty: 30 TABLET | Refills: 2 | OUTPATIENT
Start: 2023-07-20

## 2023-07-20 RX ORDER — LEVOTHYROXINE SODIUM 50 UG/1
50 TABLET ORAL EVERY EVENING
Qty: 30 TABLET | Refills: 0 | Status: SHIPPED | OUTPATIENT
Start: 2023-07-20 | End: 2024-01-23

## 2023-12-07 DIAGNOSIS — F20.9 SCHIZOPHRENIA, UNSPECIFIED TYPE (H): ICD-10-CM

## 2023-12-07 RX ORDER — PYRIDOXINE HCL (VITAMIN B6) 100 MG
TABLET ORAL
Qty: 30 TABLET | Refills: 11 | Status: SHIPPED | OUTPATIENT
Start: 2023-12-07 | End: 2024-09-29

## 2024-01-16 DIAGNOSIS — F41.9 ANXIETY DISORDER, UNSPECIFIED TYPE: Primary | ICD-10-CM

## 2024-01-23 ENCOUNTER — OFFICE VISIT (OUTPATIENT)
Dept: FAMILY MEDICINE | Facility: CLINIC | Age: 44
End: 2024-01-23
Payer: MEDICAID

## 2024-01-23 VITALS
HEIGHT: 71 IN | WEIGHT: 263 LBS | DIASTOLIC BLOOD PRESSURE: 84 MMHG | HEART RATE: 71 BPM | TEMPERATURE: 98.3 F | OXYGEN SATURATION: 98 % | SYSTOLIC BLOOD PRESSURE: 126 MMHG | BODY MASS INDEX: 36.82 KG/M2 | RESPIRATION RATE: 18 BRPM

## 2024-01-23 DIAGNOSIS — F41.9 ANXIETY DISORDER, UNSPECIFIED TYPE: ICD-10-CM

## 2024-01-23 DIAGNOSIS — E03.2 HYPOTHYROIDISM DUE TO MEDICATION: Primary | ICD-10-CM

## 2024-01-23 DIAGNOSIS — F20.9 SCHIZOPHRENIA, UNSPECIFIED TYPE (H): ICD-10-CM

## 2024-01-23 DIAGNOSIS — Z13.1 SCREENING FOR DIABETES MELLITUS: ICD-10-CM

## 2024-01-23 DIAGNOSIS — Z23 NEED FOR INFLUENZA VACCINATION: ICD-10-CM

## 2024-01-23 LAB
HBA1C MFR BLD: 4.8 % (ref 0–5.6)
TSH SERPL DL<=0.005 MIU/L-ACNC: 1.72 UIU/ML (ref 0.3–4.2)
VALPROATE SERPL-MCNC: 81.1 UG/ML

## 2024-01-23 PROCEDURE — 99213 OFFICE O/P EST LOW 20 MIN: CPT | Mod: 25 | Performed by: FAMILY MEDICINE

## 2024-01-23 PROCEDURE — 36415 COLL VENOUS BLD VENIPUNCTURE: CPT | Performed by: FAMILY MEDICINE

## 2024-01-23 PROCEDURE — 84443 ASSAY THYROID STIM HORMONE: CPT | Performed by: FAMILY MEDICINE

## 2024-01-23 PROCEDURE — 83036 HEMOGLOBIN GLYCOSYLATED A1C: CPT | Performed by: FAMILY MEDICINE

## 2024-01-23 PROCEDURE — 90471 IMMUNIZATION ADMIN: CPT | Performed by: FAMILY MEDICINE

## 2024-01-23 PROCEDURE — 90686 IIV4 VACC NO PRSV 0.5 ML IM: CPT | Performed by: FAMILY MEDICINE

## 2024-01-23 PROCEDURE — 80164 ASSAY DIPROPYLACETIC ACD TOT: CPT | Performed by: FAMILY MEDICINE

## 2024-01-23 RX ORDER — LEVOTHYROXINE SODIUM 50 UG/1
50 TABLET ORAL EVERY EVENING
Qty: 90 TABLET | Refills: 1 | Status: SHIPPED | OUTPATIENT
Start: 2024-01-23 | End: 2024-08-16

## 2024-01-23 ASSESSMENT — PAIN SCALES - GENERAL: PAINLEVEL: NO PAIN (0)

## 2024-01-23 NOTE — NURSING NOTE
"No chief complaint on file.    /84 (Cuff Size: Adult Large)   Pulse 71   Temp 98.3  F (36.8  C) (Tympanic)   Resp 18   Ht 1.803 m (5' 11\")   Wt 119.3 kg (263 lb)   SpO2 98%   BMI 36.68 kg/m   Estimated body mass index is 36.68 kg/m  as calculated from the following:    Height as of this encounter: 1.803 m (5' 11\").    Weight as of this encounter: 119.3 kg (263 lb).  Patient presents to the clinic using No DME      Health Maintenance that is potentially due pending provider review:    Health Maintenance Due   Topic Date Due    ANNUAL REVIEW OF HM ORDERS  Never done    ADVANCE CARE PLANNING  Never done    HEPATITIS B IMMUNIZATION (1 of 3 - 3-dose series) Never done    TSH W/FREE T4 REFLEX  05/06/2023    INFLUENZA VACCINE (1) 09/01/2023    COVID-19 Vaccine (3 - 2023-24 season) 09/01/2023                  "

## 2024-01-23 NOTE — PROGRESS NOTES
"  Assessment & Plan     Hypothyroidism due to medication  TSH ordered, will titrate levothyroxine dose accordingly  - TSH WITH FREE T4 REFLEX; Future  - levothyroxine (SYNTHROID/LEVOTHROID) 50 MCG tablet; Take 1 tablet (50 mcg) by mouth every evening    Schizophrenia, unspecified type (H)  Following psychiatry    Screening for diabetes mellitus  - Hemoglobin A1c; Future    Need for influenza vaccination  Influenza vaccine administered in office today      BMI  Estimated body mass index is 36.68 kg/m  as calculated from the following:    Height as of this encounter: 1.803 m (5' 11\").    Weight as of this encounter: 119.3 kg (263 lb).   Weight management plan: Discussed healthy diet and exercise guidelines      Sofia Carlton is a 44 year old, presenting for the following health issues:  Thyroid Problem    History of Present Illness       Reason for visit:  Med refill labs    He eats 2-3 servings of fruits and vegetables daily.He consumes 7 sweetened beverage(s) daily.He exercises with enough effort to increase his heart rate 9 or less minutes per day.  He exercises with enough effort to increase his heart rate 3 or less days per week.   He is taking medications regularly.       Review of Systems  Constitutional, neuro, ENT, endocrine, pulmonary, cardiac, gastrointestinal, genitourinary, musculoskeletal, integument and psychiatric systems are negative, except as otherwise noted.      Objective    /84 (Cuff Size: Adult Large)   Pulse 71   Temp 98.3  F (36.8  C) (Tympanic)   Resp 18   Ht 1.803 m (5' 11\")   Wt 119.3 kg (263 lb)   SpO2 98%   BMI 36.68 kg/m    Body mass index is 36.68 kg/m .  Physical Exam   GENERAL: alert and no distress  EYES: Eyes grossly normal to inspection, PERRL and conjunctivae and sclerae normal  HENT: normal cephalic/atraumatic, nose and mouth without ulcers or lesions, oropharynx clear, and oral mucous membranes moist  NECK: no adenopathy, no asymmetry, masses, or scars  RESP: " lungs clear to auscultation - no rales, rhonchi or wheezes  CV: regular rate and rhythm, normal S1 S2, no S3 or S4, no murmur, click or rub, no peripheral edema  ABDOMEN: soft, nontender, no hepatosplenomegaly, no masses and bowel sounds normal  MS: no gross musculoskeletal defects noted, no edema  NEURO: Grossly intact        Signed Electronically by: Ap Andres MD

## 2024-07-30 DIAGNOSIS — E03.2 HYPOTHYROIDISM DUE TO MEDICATION: ICD-10-CM

## 2024-07-31 RX ORDER — LEVOTHYROXINE SODIUM 50 UG/1
50 TABLET ORAL EVERY EVENING
Qty: 30 TABLET | Refills: 5 | OUTPATIENT
Start: 2024-07-31

## 2024-07-31 NOTE — TELEPHONE ENCOUNTER
Patient no longer under provider care, has established care with Ana Zavala CNP with [facility].     Julie Behrendt RN

## 2024-08-16 DIAGNOSIS — E03.2 HYPOTHYROIDISM DUE TO MEDICATION: ICD-10-CM

## 2024-08-16 RX ORDER — LEVOTHYROXINE SODIUM 50 UG/1
50 TABLET ORAL EVERY EVENING
Qty: 90 TABLET | Refills: 1 | Status: SHIPPED | OUTPATIENT
Start: 2024-08-16

## 2024-08-16 NOTE — TELEPHONE ENCOUNTER
Prescription approved per [facility] Refill Protocol     Twila Larose     RN MSN

## 2024-09-27 DIAGNOSIS — F20.9 SCHIZOPHRENIA, UNSPECIFIED TYPE (H): ICD-10-CM

## 2024-09-29 RX ORDER — PYRIDOXINE HCL (VITAMIN B6) 100 MG
TABLET ORAL
Qty: 30 TABLET | Refills: 11 | Status: SHIPPED | OUTPATIENT
Start: 2024-09-29

## 2025-08-26 DIAGNOSIS — F20.9 SCHIZOPHRENIA, UNSPECIFIED TYPE (H): ICD-10-CM

## 2025-08-26 RX ORDER — PYRIDOXINE HCL (VITAMIN B6) 100 MG
TABLET ORAL
Qty: 30 TABLET | Refills: 0 | Status: SHIPPED | OUTPATIENT
Start: 2025-08-26